# Patient Record
Sex: FEMALE | Race: WHITE | Employment: OTHER | URBAN - METROPOLITAN AREA
[De-identification: names, ages, dates, MRNs, and addresses within clinical notes are randomized per-mention and may not be internally consistent; named-entity substitution may affect disease eponyms.]

---

## 2018-07-17 ENCOUNTER — APPOINTMENT (OUTPATIENT)
Dept: CT IMAGING | Age: 83
End: 2018-07-17
Payer: MEDICARE

## 2018-07-17 ENCOUNTER — HOSPITAL ENCOUNTER (EMERGENCY)
Age: 83
Discharge: HOME | End: 2018-07-17
Payer: MEDICARE

## 2018-07-17 VITALS
HEIGHT: 59 IN | HEART RATE: 69 BPM | WEIGHT: 101 LBS | OXYGEN SATURATION: 96 % | RESPIRATION RATE: 20 BRPM | DIASTOLIC BLOOD PRESSURE: 79 MMHG | TEMPERATURE: 98.8 F | SYSTOLIC BLOOD PRESSURE: 173 MMHG | BODY MASS INDEX: 20.36 KG/M2

## 2018-07-17 DIAGNOSIS — K59.00 CONSTIPATION, UNSPECIFIED CONSTIPATION TYPE: Primary | ICD-10-CM

## 2018-07-17 DIAGNOSIS — N83.8 MASS OF LEFT OVARY: ICD-10-CM

## 2018-07-17 LAB
ALBUMIN SERPL-MCNC: 4.2 G/DL (ref 3.5–5.2)
ALP BLD-CCNC: 75 U/L (ref 35–104)
ALT SERPL-CCNC: 19 U/L (ref 0–32)
ANION GAP SERPL CALCULATED.3IONS-SCNC: 13 MMOL/L (ref 7–16)
AST SERPL-CCNC: 36 U/L (ref 0–31)
BASOPHILS ABSOLUTE: 0.06 E9/L (ref 0–0.2)
BASOPHILS RELATIVE PERCENT: 0.4 % (ref 0–2)
BILIRUB SERPL-MCNC: 0.7 MG/DL (ref 0–1.2)
BILIRUBIN URINE: NEGATIVE
BLOOD, URINE: NEGATIVE
BUN BLDV-MCNC: 15 MG/DL (ref 8–23)
CALCIUM SERPL-MCNC: 9.5 MG/DL (ref 8.6–10.2)
CHLORIDE BLD-SCNC: 90 MMOL/L (ref 98–107)
CLARITY: CLEAR
CO2: 26 MMOL/L (ref 22–29)
COLOR: YELLOW
CREAT SERPL-MCNC: 0.8 MG/DL (ref 0.5–1)
EOSINOPHILS ABSOLUTE: 0.18 E9/L (ref 0.05–0.5)
EOSINOPHILS RELATIVE PERCENT: 1.2 % (ref 0–6)
GFR AFRICAN AMERICAN: >60
GFR NON-AFRICAN AMERICAN: >60 ML/MIN/1.73
GLUCOSE BLD-MCNC: 101 MG/DL (ref 74–109)
GLUCOSE URINE: NEGATIVE MG/DL
HCT VFR BLD CALC: 34.7 % (ref 34–48)
HEMOGLOBIN: 11.1 G/DL (ref 11.5–15.5)
IMMATURE GRANULOCYTES #: 0.06 E9/L
IMMATURE GRANULOCYTES %: 0.4 % (ref 0–5)
KETONES, URINE: NEGATIVE MG/DL
LACTIC ACID: 1.7 MMOL/L (ref 0.5–2.2)
LEUKOCYTE ESTERASE, URINE: NEGATIVE
LIPASE: 41 U/L (ref 13–60)
LYMPHOCYTES ABSOLUTE: 1.67 E9/L (ref 1.5–4)
LYMPHOCYTES RELATIVE PERCENT: 11.2 % (ref 20–42)
MCH RBC QN AUTO: 28.7 PG (ref 26–35)
MCHC RBC AUTO-ENTMCNC: 32 % (ref 32–34.5)
MCV RBC AUTO: 89.7 FL (ref 80–99.9)
MONOCYTES ABSOLUTE: 1.35 E9/L (ref 0.1–0.95)
MONOCYTES RELATIVE PERCENT: 9 % (ref 2–12)
NEUTROPHILS ABSOLUTE: 11.65 E9/L (ref 1.8–7.3)
NEUTROPHILS RELATIVE PERCENT: 77.8 % (ref 43–80)
NITRITE, URINE: NEGATIVE
PDW BLD-RTO: 15 FL (ref 11.5–15)
PH UA: 6.5 (ref 5–9)
PLATELET # BLD: 315 E9/L (ref 130–450)
PMV BLD AUTO: 8.7 FL (ref 7–12)
POTASSIUM SERPL-SCNC: 4.4 MMOL/L (ref 3.5–5)
PROTEIN UA: NEGATIVE MG/DL
RBC # BLD: 3.87 E12/L (ref 3.5–5.5)
SODIUM BLD-SCNC: 129 MMOL/L (ref 132–146)
SPECIFIC GRAVITY UA: 1.01 (ref 1–1.03)
TOTAL PROTEIN: 7.3 G/DL (ref 6.4–8.3)
UROBILINOGEN, URINE: 0.2 E.U./DL
WBC # BLD: 15 E9/L (ref 4.5–11.5)

## 2018-07-17 PROCEDURE — 74177 CT ABD & PELVIS W/CONTRAST: CPT

## 2018-07-17 PROCEDURE — 81003 URINALYSIS AUTO W/O SCOPE: CPT

## 2018-07-17 PROCEDURE — 80053 COMPREHEN METABOLIC PANEL: CPT

## 2018-07-17 PROCEDURE — 6360000004 HC RX CONTRAST MEDICATION: Performed by: RADIOLOGY

## 2018-07-17 PROCEDURE — 85025 COMPLETE CBC W/AUTO DIFF WBC: CPT

## 2018-07-17 PROCEDURE — 36415 COLL VENOUS BLD VENIPUNCTURE: CPT

## 2018-07-17 PROCEDURE — 83690 ASSAY OF LIPASE: CPT

## 2018-07-17 PROCEDURE — 2580000003 HC RX 258: Performed by: PHYSICIAN ASSISTANT

## 2018-07-17 PROCEDURE — 99284 EMERGENCY DEPT VISIT MOD MDM: CPT

## 2018-07-17 PROCEDURE — 83605 ASSAY OF LACTIC ACID: CPT

## 2018-07-17 RX ORDER — SODIUM PHOSPHATE, DIBASIC AND SODIUM PHOSPHATE, MONOBASIC 7; 19 G/133ML; G/133ML
1 ENEMA RECTAL
Qty: 1 BOTTLE | Refills: 0 | Status: SHIPPED | OUTPATIENT
Start: 2018-07-17 | End: 2018-07-17

## 2018-07-17 RX ORDER — SENNA AND DOCUSATE SODIUM 50; 8.6 MG/1; MG/1
1 TABLET, FILM COATED ORAL DAILY
Qty: 30 TABLET | Refills: 0 | Status: ON HOLD | OUTPATIENT
Start: 2018-07-17 | End: 2020-07-21 | Stop reason: ALTCHOICE

## 2018-07-17 RX ORDER — MAGNESIUM CARB/ALUMINUM HYDROX 105-160MG
150 TABLET,CHEWABLE ORAL DAILY
Qty: 300 ML | Refills: 0 | Status: SHIPPED | OUTPATIENT
Start: 2018-07-17 | End: 2018-07-19

## 2018-07-17 RX ORDER — 0.9 % SODIUM CHLORIDE 0.9 %
1000 INTRAVENOUS SOLUTION INTRAVENOUS ONCE
Status: COMPLETED | OUTPATIENT
Start: 2018-07-17 | End: 2018-07-17

## 2018-07-17 RX ADMIN — SODIUM CHLORIDE 1000 ML: 9 INJECTION, SOLUTION INTRAVENOUS at 19:29

## 2018-07-17 RX ADMIN — IOHEXOL 50 ML: 240 INJECTION, SOLUTION INTRATHECAL; INTRAVASCULAR; INTRAVENOUS; ORAL at 20:39

## 2018-07-17 RX ADMIN — IOPAMIDOL 80 ML: 755 INJECTION, SOLUTION INTRAVENOUS at 20:39

## 2018-07-17 ASSESSMENT — PAIN DESCRIPTION - ORIENTATION: ORIENTATION: RIGHT

## 2018-07-17 ASSESSMENT — PAIN DESCRIPTION - LOCATION: LOCATION: BACK

## 2018-07-17 ASSESSMENT — PAIN SCALES - GENERAL: PAINLEVEL_OUTOF10: 7

## 2018-07-17 ASSESSMENT — PAIN DESCRIPTION - PAIN TYPE: TYPE: ACUTE PAIN

## 2018-07-17 ASSESSMENT — PAIN DESCRIPTION - DESCRIPTORS: DESCRIPTORS: ACHING

## 2018-07-17 NOTE — ED NOTES
Pt. C/o low, right sided back pain for the past week. Pt. Also states they have not had a BM for the past 4 days. Pt. States they also fell last night and were seen at a urgent care this morning for a skin tear on the R. Arm.      Maryana Nichole RN  07/17/18 7242

## 2018-07-17 NOTE — ED PROVIDER NOTES
Eosinophils # 0.18 0.05 - 0.50 E9/L    Basophils # 0.06 0.00 - 0.20 E9/L   Comprehensive Metabolic Panel   Result Value Ref Range    Sodium 129 (L) 132 - 146 mmol/L    Potassium 4.4 3.5 - 5.0 mmol/L    Chloride 90 (L) 98 - 107 mmol/L    CO2 26 22 - 29 mmol/L    Anion Gap 13 7 - 16 mmol/L    Glucose 101 74 - 109 mg/dL    BUN 15 8 - 23 mg/dL    CREATININE 0.8 0.5 - 1.0 mg/dL    GFR Non-African American >60 >=60 mL/min/1.73    GFR African American >60     Calcium 9.5 8.6 - 10.2 mg/dL    Total Protein 7.3 6.4 - 8.3 g/dL    Alb 4.2 3.5 - 5.2 g/dL    Total Bilirubin 0.7 0.0 - 1.2 mg/dL    Alkaline Phosphatase 75 35 - 104 U/L    ALT 19 0 - 32 U/L    AST 36 (H) 0 - 31 U/L   Lipase   Result Value Ref Range    Lipase 41 13 - 60 U/L   Urinalysis   Result Value Ref Range    Color, UA Yellow Straw/Yellow    Clarity, UA Clear Clear    Glucose, Ur Negative Negative mg/dL    Bilirubin Urine Negative Negative    Ketones, Urine Negative Negative mg/dL    Specific Gravity, UA 1.015 1.005 - 1.030    Blood, Urine Negative Negative    pH, UA 6.5 5.0 - 9.0    Protein, UA Negative Negative mg/dL    Urobilinogen, Urine 0.2 <2.0 E.U./dL    Nitrite, Urine Negative Negative    Leukocyte Esterase, Urine Negative Negative   Lactic Acid, Plasma   Result Value Ref Range    Lactic Acid 1.7 0.5 - 2.2 mmol/L        Imaging: All Radiology results interpreted by Radiologist unless otherwise noted. CT ABDOMEN PELVIS W IV CONTRAST Additional Contrast? Oral   Final Result   1. Fecal retention. 2. Small right pleural effusion and dependent bibasilar airspace   disease. 3. 53 x 40 mm cystic mass left ovary/adnexa which could be further   evaluated with nonemergent sonogram if clinically relevant.         ED Course / Medical Decision Making     Medications   0.9 % sodium chloride bolus (1,000 mLs Intravenous New Bag 7/17/18 1929)   iohexol (OMNIPAQUE 240) injection 50 mL (50 mLs Oral Given 7/17/18 2039)   iopamidol (ISOVUE-370) 76 % injection 80 mL NI  07/17/18 214

## 2018-07-18 NOTE — ED NOTES
Pt. Returned to bed from bathroom. Pt. States they passed a small amount of stool.      Rossi Hernandez RN  07/17/18 1545

## 2018-07-19 ENCOUNTER — APPOINTMENT (OUTPATIENT)
Dept: GENERAL RADIOLOGY | Age: 83
End: 2018-07-19
Payer: MEDICARE

## 2018-07-19 ENCOUNTER — HOSPITAL ENCOUNTER (EMERGENCY)
Age: 83
Discharge: HOME | End: 2018-07-19
Attending: EMERGENCY MEDICINE
Payer: MEDICARE

## 2018-07-19 ENCOUNTER — APPOINTMENT (OUTPATIENT)
Dept: CT IMAGING | Age: 83
End: 2018-07-19
Payer: MEDICARE

## 2018-07-19 VITALS
RESPIRATION RATE: 18 BRPM | HEIGHT: 59 IN | DIASTOLIC BLOOD PRESSURE: 90 MMHG | OXYGEN SATURATION: 97 % | WEIGHT: 101 LBS | HEART RATE: 83 BPM | SYSTOLIC BLOOD PRESSURE: 150 MMHG | BODY MASS INDEX: 20.36 KG/M2 | TEMPERATURE: 98.9 F

## 2018-07-19 DIAGNOSIS — R29.6 FREQUENT FALLS: ICD-10-CM

## 2018-07-19 DIAGNOSIS — M19.90 ARTHRITIS: ICD-10-CM

## 2018-07-19 DIAGNOSIS — S40.021A ARM CONTUSION, RIGHT, INITIAL ENCOUNTER: ICD-10-CM

## 2018-07-19 DIAGNOSIS — M25.561 ACUTE PAIN OF RIGHT KNEE: Primary | ICD-10-CM

## 2018-07-19 PROCEDURE — 73560 X-RAY EXAM OF KNEE 1 OR 2: CPT

## 2018-07-19 PROCEDURE — 99284 EMERGENCY DEPT VISIT MOD MDM: CPT

## 2018-07-19 PROCEDURE — 70450 CT HEAD/BRAIN W/O DYE: CPT

## 2018-07-19 PROCEDURE — 72125 CT NECK SPINE W/O DYE: CPT

## 2018-07-19 PROCEDURE — 6370000000 HC RX 637 (ALT 250 FOR IP): Performed by: EMERGENCY MEDICINE

## 2018-07-19 PROCEDURE — 73610 X-RAY EXAM OF ANKLE: CPT

## 2018-07-19 PROCEDURE — 73070 X-RAY EXAM OF ELBOW: CPT

## 2018-07-19 PROCEDURE — 73090 X-RAY EXAM OF FOREARM: CPT

## 2018-07-19 RX ORDER — ACETAMINOPHEN 325 MG/1
650 TABLET ORAL ONCE
Status: COMPLETED | OUTPATIENT
Start: 2018-07-19 | End: 2018-07-19

## 2018-07-19 RX ADMIN — ACETAMINOPHEN 650 MG: 325 TABLET ORAL at 21:39

## 2018-07-19 ASSESSMENT — PAIN SCALES - GENERAL
PAINLEVEL_OUTOF10: 1
PAINLEVEL_OUTOF10: 6
PAINLEVEL_OUTOF10: 9

## 2018-07-19 ASSESSMENT — ENCOUNTER SYMPTOMS
EYE REDNESS: 0
CONSTIPATION: 0
RHINORRHEA: 0
BACK PAIN: 0
NAUSEA: 0
SHORTNESS OF BREATH: 0
COUGH: 0
SORE THROAT: 0
VOMITING: 0
EYE PAIN: 0
DIARRHEA: 0
COLOR CHANGE: 1
WHEEZING: 0
ABDOMINAL PAIN: 0
BLOOD IN STOOL: 0

## 2018-07-19 ASSESSMENT — PAIN DESCRIPTION - DESCRIPTORS
DESCRIPTORS: ACHING
DESCRIPTORS: ACHING

## 2018-07-19 ASSESSMENT — PAIN DESCRIPTION - PAIN TYPE
TYPE: ACUTE PAIN
TYPE: ACUTE PAIN

## 2018-07-19 ASSESSMENT — PAIN DESCRIPTION - FREQUENCY: FREQUENCY: CONTINUOUS

## 2018-07-19 ASSESSMENT — PAIN DESCRIPTION - PROGRESSION: CLINICAL_PROGRESSION: GRADUALLY IMPROVING

## 2018-07-19 ASSESSMENT — PAIN DESCRIPTION - ORIENTATION
ORIENTATION: RIGHT
ORIENTATION: RIGHT

## 2018-07-19 ASSESSMENT — PAIN DESCRIPTION - LOCATION
LOCATION: KNEE
LOCATION: KNEE

## 2018-07-20 NOTE — ED PROVIDER NOTES
The patient is an 80-year-old female presents to the ED from home via EMS for the chief complaint of right knee pain. Onset started today. She complains of pain to her right knee as well as swelling. She denies any fall today. The patient's son and daughter-in-law are present at bedside and contribute to the history. The patient lives at home alone. She has a walker and a cane but does not use them to ambulate with at home. The patient reports she has severe arthritis and this is why she has frequent falls. She reports her last fall was on Tuesday evening. She states the lights went out and she tripped and fell landing on her right arm. She complains of bruising to her right arm and said that her right elbow was bleeding and went to urgent care with her family that evening. It is reported that her arm was wrapped but they deny her ever getting an x-ray at that time. The patient was also having constipation and was subsequently taken to [facility] ER for evaluation where she underwent blood work as well as a CAT scan of her abdomen which showed constipation at that time. She was given medications for her constipation and discharged home. The family reports that after she got home from the hospital she passed out for approximately 7-10 minutes. They denied her falling or hitting her head at that time. They deny any confusion yesterday or today. They report she has been having difficulty walking around her house. They report she has frequent falls. Deny being on any blood thinners except for baby aspirin. She is up-to-date on her tetanus. The history is provided by the patient. Review of Systems   Constitutional: Negative for chills and fever. HENT: Negative for ear pain, rhinorrhea and sore throat. Eyes: Negative for pain, redness and visual disturbance. Respiratory: Negative for cough, shortness of breath and wheezing. Cardiovascular: Negative for chest pain.    Gastrointestinal: Negative for motion without pain. Cardiovascular: Normal rate, regular rhythm, normal heart sounds and intact distal pulses. No murmur heard. Radial pulses are strong and symmetric bilaterally. No extremity edema. Pulmonary/Chest: Effort normal and breath sounds normal. No stridor. No respiratory distress. She has no wheezes. She has no rales. She exhibits no tenderness. Normal effort. No distress. SPO2 95% on room air. Abdominal: Soft. Bowel sounds are normal. There is no tenderness. There is no rebound and no guarding. Soft, no distention. Musculoskeletal: She exhibits no edema. Lymphadenopathy:     She has no cervical adenopathy. Neurological: She is alert and oriented to person, place, and time. No cranial nerve deficit. Coordination normal.   She is awake, alert and oriented. She converses appropriately. No slurred speech. No facial droop. No focal neurological deficit. Sensation is intact to light touch and symmetric throughout. Strength-no strong and symmetric in upper and lower extremities bilaterally. Skin: Skin is warm and dry. Bruising and ecchymosis noted. She is not diaphoretic. Superficial abrasion noted to the right posterior elbow. Nursing note and vitals reviewed. Procedures    MDM  Number of Diagnoses or Management Options  Acute pain of right knee:   Arm contusion, right, initial encounter:   Arthritis:   Frequent falls:   Diagnosis management comments: 27-year-old female presents to the for right knee pain. Further history reveals frequent falls. Exam reveals some bruising. She does have tenderness to palpation to her right knee. Imaging was obtained and reveals severe arthritis but no acute fracture or dislocation. Vitals are appropriate for her age. Recommended Tylenol for her pain and patient was given Tylenol here prior to discharge and felt better. She was also given ice for her knee. She was told to elevate and ice her knee.  Also recommended a knee brace that she can

## 2019-02-17 ENCOUNTER — APPOINTMENT (OUTPATIENT)
Dept: CT IMAGING | Age: 84
End: 2019-02-17
Payer: MEDICARE

## 2019-02-17 ENCOUNTER — APPOINTMENT (OUTPATIENT)
Dept: GENERAL RADIOLOGY | Age: 84
End: 2019-02-17
Payer: MEDICARE

## 2019-02-17 ENCOUNTER — HOSPITAL ENCOUNTER (OUTPATIENT)
Age: 84
Discharge: HOME | End: 2019-02-17
Payer: MEDICARE

## 2019-02-17 ENCOUNTER — HOSPITAL ENCOUNTER (EMERGENCY)
Age: 84
Discharge: HOME | End: 2019-02-18
Attending: EMERGENCY MEDICINE
Payer: MEDICARE

## 2019-02-17 ENCOUNTER — HOSPITAL ENCOUNTER (EMERGENCY)
Age: 84
Discharge: HOME | End: 2019-02-17
Attending: EMERGENCY MEDICINE
Payer: MEDICARE

## 2019-02-17 VITALS
HEART RATE: 73 BPM | TEMPERATURE: 98.2 F | SYSTOLIC BLOOD PRESSURE: 195 MMHG | DIASTOLIC BLOOD PRESSURE: 88 MMHG | RESPIRATION RATE: 16 BRPM | OXYGEN SATURATION: 95 % | WEIGHT: 108.31 LBS | BODY MASS INDEX: 21.88 KG/M2

## 2019-02-17 DIAGNOSIS — S02.401A CLOSED FRACTURE OF MAXILLARY SINUS, INITIAL ENCOUNTER (HCC): Primary | ICD-10-CM

## 2019-02-17 DIAGNOSIS — S62.607A CLOSED NONDISPLACED FRACTURE OF PHALANX OF LEFT LITTLE FINGER, UNSPECIFIED PHALANX, INITIAL ENCOUNTER: ICD-10-CM

## 2019-02-17 DIAGNOSIS — S02.401A CLOSED FRACTURE OF MAXILLARY SINUS, INITIAL ENCOUNTER (HCC): ICD-10-CM

## 2019-02-17 DIAGNOSIS — S61.213A LACERATION OF LEFT MIDDLE FINGER WITHOUT FOREIGN BODY WITHOUT DAMAGE TO NAIL, INITIAL ENCOUNTER: ICD-10-CM
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
## 2019-02-17 DIAGNOSIS — T14.90XA TRAUMA: Primary | ICD-10-CM

## 2019-02-17 LAB
ALBUMIN SERPL-MCNC: 3.8 G/DL (ref 3.5–5.2)
ALP BLD-CCNC: 61 U/L (ref 35–104)
ALT SERPL-CCNC: 11 U/L (ref 0–32)
ANION GAP SERPL CALCULATED.3IONS-SCNC: 10 MMOL/L (ref 7–16)
AST SERPL-CCNC: 21 U/L (ref 0–31)
BASOPHILS ABSOLUTE: 0.04 E9/L (ref 0–0.2)
BASOPHILS RELATIVE PERCENT: 0.3 % (ref 0–2)
BILIRUB SERPL-MCNC: 0.6 MG/DL (ref 0–1.2)
BUN BLDV-MCNC: 17 MG/DL (ref 8–23)
CALCIUM SERPL-MCNC: 9.2 MG/DL (ref 8.6–10.2)
CHLORIDE BLD-SCNC: 105 MMOL/L (ref 98–107)
CO2: 24 MMOL/L (ref 22–29)
CREAT SERPL-MCNC: 0.8 MG/DL (ref 0.5–1)
EOSINOPHILS ABSOLUTE: 0.11 E9/L (ref 0.05–0.5)
EOSINOPHILS RELATIVE PERCENT: 0.9 % (ref 0–6)
GFR AFRICAN AMERICAN: >60
GFR NON-AFRICAN AMERICAN: >60 ML/MIN/1.73
GLUCOSE BLD-MCNC: 115 MG/DL (ref 74–99)
HCT VFR BLD CALC: 37.3 % (ref 34–48)
HEMOGLOBIN: 11.7 G/DL (ref 11.5–15.5)
IMMATURE GRANULOCYTES #: 0.08 E9/L
IMMATURE GRANULOCYTES %: 0.6 % (ref 0–5)
LYMPHOCYTES ABSOLUTE: 1.04 E9/L (ref 1.5–4)
LYMPHOCYTES RELATIVE PERCENT: 8.1 % (ref 20–42)
MCH RBC QN AUTO: 29.9 PG (ref 26–35)
MCHC RBC AUTO-ENTMCNC: 31.4 % (ref 32–34.5)
MCV RBC AUTO: 95.4 FL (ref 80–99.9)
MONOCYTES ABSOLUTE: 1 E9/L (ref 0.1–0.95)
MONOCYTES RELATIVE PERCENT: 7.8 % (ref 2–12)
NEUTROPHILS ABSOLUTE: 10.5 E9/L (ref 1.8–7.3)
NEUTROPHILS RELATIVE PERCENT: 82.3 % (ref 43–80)
PDW BLD-RTO: 14.1 FL (ref 11.5–15)
PLATELET # BLD: 282 E9/L (ref 130–450)
PMV BLD AUTO: 8.9 FL (ref 7–12)
POTASSIUM SERPL-SCNC: 3.8 MMOL/L (ref 3.5–5)
RBC # BLD: 3.91 E12/L (ref 3.5–5.5)
SODIUM BLD-SCNC: 139 MMOL/L (ref 132–146)
TOTAL PROTEIN: 6.5 G/DL (ref 6.4–8.3)
WBC # BLD: 12.8 E9/L (ref 4.5–11.5)

## 2019-02-17 PROCEDURE — 80053 COMPREHEN METABOLIC PANEL: CPT

## 2019-02-17 PROCEDURE — 6370000000 HC RX 637 (ALT 250 FOR IP): Performed by: SURGERY

## 2019-02-17 PROCEDURE — 70450 CT HEAD/BRAIN W/O DYE: CPT

## 2019-02-17 PROCEDURE — 6370000000 HC RX 637 (ALT 250 FOR IP)

## 2019-02-17 PROCEDURE — 90715 TDAP VACCINE 7 YRS/> IM: CPT | Performed by: STUDENT IN AN ORGANIZED HEALTH CARE EDUCATION/TRAINING PROGRAM

## 2019-02-17 PROCEDURE — A0425 GROUND MILEAGE: HCPCS

## 2019-02-17 PROCEDURE — 73562 X-RAY EXAM OF KNEE 3: CPT

## 2019-02-17 PROCEDURE — 6370000000 HC RX 637 (ALT 250 FOR IP): Performed by: STUDENT IN AN ORGANIZED HEALTH CARE EDUCATION/TRAINING PROGRAM

## 2019-02-17 PROCEDURE — 72170 X-RAY EXAM OF PELVIS: CPT

## 2019-02-17 PROCEDURE — 12013 RPR F/E/E/N/L/M 2.6-5.0 CM: CPT

## 2019-02-17 PROCEDURE — 6360000002 HC RX W HCPCS: Performed by: EMERGENCY MEDICINE

## 2019-02-17 PROCEDURE — 99284 EMERGENCY DEPT VISIT MOD MDM: CPT

## 2019-02-17 PROCEDURE — 12002 RPR S/N/AX/GEN/TRNK2.6-7.5CM: CPT

## 2019-02-17 PROCEDURE — A0426 ALS 1: HCPCS

## 2019-02-17 PROCEDURE — 96374 THER/PROPH/DIAG INJ IV PUSH: CPT

## 2019-02-17 PROCEDURE — 73130 X-RAY EXAM OF HAND: CPT

## 2019-02-17 PROCEDURE — 85025 COMPLETE CBC W/AUTO DIFF WBC: CPT

## 2019-02-17 PROCEDURE — 72125 CT NECK SPINE W/O DYE: CPT

## 2019-02-17 PROCEDURE — 36415 COLL VENOUS BLD VENIPUNCTURE: CPT

## 2019-02-17 PROCEDURE — 6360000002 HC RX W HCPCS: Performed by: STUDENT IN AN ORGANIZED HEALTH CARE EDUCATION/TRAINING PROGRAM

## 2019-02-17 PROCEDURE — 71045 X-RAY EXAM CHEST 1 VIEW: CPT

## 2019-02-17 PROCEDURE — 90471 IMMUNIZATION ADMIN: CPT | Performed by: STUDENT IN AN ORGANIZED HEALTH CARE EDUCATION/TRAINING PROGRAM

## 2019-02-17 PROCEDURE — 70486 CT MAXILLOFACIAL W/O DYE: CPT

## 2019-02-17 RX ORDER — CEPHALEXIN 500 MG/1
500 CAPSULE ORAL 4 TIMES DAILY
Qty: 20 CAPSULE | Refills: 0 | Status: SHIPPED | OUTPATIENT
Start: 2019-02-17 | End: 2019-02-18

## 2019-02-17 RX ORDER — FENTANYL CITRATE 50 UG/ML
50 INJECTION, SOLUTION INTRAMUSCULAR; INTRAVENOUS ONCE
Status: COMPLETED | OUTPATIENT
Start: 2019-02-17 | End: 2019-02-17

## 2019-02-17 RX ORDER — METOPROLOL TARTRATE 100 MG/1
100 TABLET ORAL
Status: ON HOLD | COMMUNITY
End: 2020-07-26 | Stop reason: HOSPADM

## 2019-02-17 RX ORDER — FENTANYL CITRATE 50 UG/ML
INJECTION, SOLUTION INTRAMUSCULAR; INTRAVENOUS
Status: DISCONTINUED
Start: 2019-02-17 | End: 2019-02-17 | Stop reason: HOSPADM

## 2019-02-17 RX ORDER — CEPHALEXIN 250 MG/1
250 CAPSULE ORAL EVERY 6 HOURS SCHEDULED
Status: DISCONTINUED | OUTPATIENT
Start: 2019-02-18 | End: 2019-02-18 | Stop reason: HOSPADM

## 2019-02-17 RX ORDER — ACETAMINOPHEN 500 MG
500 TABLET ORAL ONCE
Status: COMPLETED | OUTPATIENT
Start: 2019-02-17 | End: 2019-02-17

## 2019-02-17 RX ADMIN — Medication 2 EACH: at 18:55

## 2019-02-17 RX ADMIN — FENTANYL CITRATE 50 MCG: 50 INJECTION, SOLUTION INTRAMUSCULAR; INTRAVENOUS at 19:33

## 2019-02-17 RX ADMIN — ACETAMINOPHEN 500 MG: 500 TABLET ORAL at 23:30

## 2019-02-17 RX ADMIN — CEPHALEXIN 250 MG: 250 CAPSULE ORAL at 22:44

## 2019-02-17 RX ADMIN — TETANUS TOXOID, REDUCED DIPHTHERIA TOXOID AND ACELLULAR PERTUSSIS VACCINE, ADSORBED 0.5 ML: 5; 2.5; 8; 8; 2.5 SUSPENSION INTRAMUSCULAR at 18:46

## 2019-02-17 ASSESSMENT — PAIN SCALES - GENERAL
PAINLEVEL_OUTOF10: 7
PAINLEVEL_OUTOF10: 4
PAINLEVEL_OUTOF10: 8
PAINLEVEL_OUTOF10: 7
PAINLEVEL_OUTOF10: 4

## 2019-02-17 ASSESSMENT — PAIN DESCRIPTION - ORIENTATION
ORIENTATION: MID
ORIENTATION: LEFT

## 2019-02-17 ASSESSMENT — PAIN DESCRIPTION - DESCRIPTORS
DESCRIPTORS: ACHING
DESCRIPTORS: DISCOMFORT

## 2019-02-17 ASSESSMENT — PAIN DESCRIPTION - PAIN TYPE
TYPE: ACUTE PAIN
TYPE: ACUTE PAIN

## 2019-02-17 ASSESSMENT — PAIN DESCRIPTION - ONSET: ONSET: ON-GOING

## 2019-02-17 ASSESSMENT — PAIN DESCRIPTION - FREQUENCY: FREQUENCY: INTERMITTENT

## 2019-02-17 ASSESSMENT — PAIN DESCRIPTION - LOCATION
LOCATION: FINGER (COMMENT WHICH ONE)
LOCATION: FACE

## 2019-02-18 ENCOUNTER — TELEPHONE (OUTPATIENT)
Dept: SURGERY | Age: 84
End: 2019-02-18

## 2019-02-18 VITALS
OXYGEN SATURATION: 95 % | SYSTOLIC BLOOD PRESSURE: 161 MMHG | DIASTOLIC BLOOD PRESSURE: 81 MMHG | RESPIRATION RATE: 18 BRPM | BODY MASS INDEX: 21.77 KG/M2 | TEMPERATURE: 97.1 F | WEIGHT: 108 LBS | HEIGHT: 59 IN | HEART RATE: 79 BPM

## 2019-02-18 PROBLEM — S02.2XXA CLOSED FRACTURE OF NASAL BONE: Status: ACTIVE | Noted: 2019-02-18

## 2019-02-18 PROBLEM — S62.91XA CLOSED FRACTURE OF RIGHT HAND: Status: ACTIVE | Noted: 2019-02-18

## 2019-02-18 PROCEDURE — 99283 EMERGENCY DEPT VISIT LOW MDM: CPT | Performed by: SURGERY

## 2019-02-18 RX ORDER — CEPHALEXIN 500 MG/1
500 CAPSULE ORAL 4 TIMES DAILY
Qty: 20 CAPSULE | Refills: 0 | Status: SHIPPED | OUTPATIENT
Start: 2019-02-18 | End: 2019-02-23

## 2019-02-18 ASSESSMENT — ENCOUNTER SYMPTOMS
FACIAL SWELLING: 1
RHINORRHEA: 0
SHORTNESS OF BREATH: 0
ABDOMINAL PAIN: 0
PHOTOPHOBIA: 0
SINUS PAIN: 1
CHEST TIGHTNESS: 0
BACK PAIN: 0

## 2019-02-20 ENCOUNTER — OFFICE VISIT (OUTPATIENT)
Dept: SURGERY | Age: 84
End: 2019-02-20
Payer: MEDICARE

## 2019-02-20 VITALS — TEMPERATURE: 98 F | HEART RATE: 79 BPM | OXYGEN SATURATION: 93 % | RESPIRATION RATE: 20 BRPM

## 2019-02-20 DIAGNOSIS — S02.2XXA CLOSED FRACTURE OF NASAL BONE, INITIAL ENCOUNTER: Primary | ICD-10-CM

## 2019-02-20 PROCEDURE — 99204 OFFICE O/P NEW MOD 45 MIN: CPT | Performed by: PLASTIC SURGERY

## 2019-02-22 LAB
EKG ATRIAL RATE: 77 BPM
EKG P AXIS: 101 DEGREES
EKG P-R INTERVAL: 150 MS
EKG Q-T INTERVAL: 388 MS
EKG QRS DURATION: 74 MS
EKG QTC CALCULATION (BAZETT): 439 MS
EKG R AXIS: 29 DEGREES
EKG T AXIS: 60 DEGREES
EKG VENTRICULAR RATE: 77 BPM

## 2019-04-03 ENCOUNTER — OFFICE VISIT (OUTPATIENT)
Dept: SURGERY | Age: 84
End: 2019-04-03
Payer: MEDICARE

## 2019-04-03 VITALS
TEMPERATURE: 98.5 F | DIASTOLIC BLOOD PRESSURE: 66 MMHG | HEART RATE: 59 BPM | BODY MASS INDEX: 21.37 KG/M2 | OXYGEN SATURATION: 96 % | WEIGHT: 106 LBS | SYSTOLIC BLOOD PRESSURE: 108 MMHG | HEIGHT: 59 IN

## 2019-04-03 DIAGNOSIS — S02.2XXA CLOSED FRACTURE OF NASAL BONE, INITIAL ENCOUNTER: Primary | ICD-10-CM

## 2019-04-03 PROCEDURE — 99213 OFFICE O/P EST LOW 20 MIN: CPT | Performed by: PLASTIC SURGERY

## 2019-04-03 NOTE — PROGRESS NOTES
Subjective: Follow up today from minimally displaced nasal bone fracture. Patient states that she had some dried blood from the nose a few weeks ago but has evanescent stuffiness. Objective:    /66 (Site: Left Upper Arm, Position: Sitting)   Pulse 59   Temp 98.5 °F (36.9 °C)   Ht 4' 11\" (1.499 m)   Wt 106 lb (48.1 kg)   SpO2 96%   BMI 21.41 kg/m²       Wound: Clean, dry, and intact, no drainage. Nose is midline. Some irritation to the right nasal septum. Assessment:    Patient Active Problem List   Diagnosis    Osteomyelitis of toe of right foot (HCC)    Multiple rib fractures    [facility]oid [facility]) [facility] [address]    Closed fracture of nasal bone    Closed fracture of right hand    Closed fracture of maxillary sinus ([facility])       Plan:     No surgical intervention required. Educated patient and son to utilize nasal saline sprays for lubrication and cleansing the nose. F/U when necessary    Call office with concerns or signs of infection. Lucia Simon MD   10:54 AM  4/3/2019.

## 2019-04-23 ENCOUNTER — HOSPITAL ENCOUNTER (EMERGENCY)
Age: 84
Discharge: HOME | End: 2019-04-23
Attending: EMERGENCY MEDICINE
Payer: MEDICARE

## 2019-04-23 ENCOUNTER — APPOINTMENT (OUTPATIENT)
Dept: ULTRASOUND IMAGING | Age: 84
End: 2019-04-23
Payer: MEDICARE

## 2019-04-23 VITALS
DIASTOLIC BLOOD PRESSURE: 78 MMHG | SYSTOLIC BLOOD PRESSURE: 164 MMHG | WEIGHT: 105 LBS | BODY MASS INDEX: 21.17 KG/M2 | RESPIRATION RATE: 16 BRPM | HEART RATE: 57 BPM | TEMPERATURE: 98.1 F | OXYGEN SATURATION: 95 % | HEIGHT: 59 IN

## 2019-04-23 DIAGNOSIS — R10.30 LOWER ABDOMINAL PAIN: Primary | ICD-10-CM

## 2019-04-23 LAB
ANION GAP SERPL CALCULATED.3IONS-SCNC: 11 MMOL/L (ref 7–16)
BASOPHILS ABSOLUTE: 0.05 E9/L (ref 0–0.2)
BASOPHILS RELATIVE PERCENT: 0.4 % (ref 0–2)
BILIRUBIN URINE: NEGATIVE
BLOOD, URINE: NEGATIVE
BUN BLDV-MCNC: 21 MG/DL (ref 8–23)
CALCIUM SERPL-MCNC: 9.7 MG/DL (ref 8.6–10.2)
CHLORIDE BLD-SCNC: 96 MMOL/L (ref 98–107)
CLARITY: CLEAR
CO2: 27 MMOL/L (ref 22–29)
COLOR: YELLOW
CREAT SERPL-MCNC: 1 MG/DL (ref 0.5–1)
EKG ATRIAL RATE: 61 BPM
EKG P AXIS: 60 DEGREES
EKG P-R INTERVAL: 166 MS
EKG Q-T INTERVAL: 422 MS
EKG QRS DURATION: 82 MS
EKG QTC CALCULATION (BAZETT): 424 MS
EKG R AXIS: 32 DEGREES
EKG T AXIS: 70 DEGREES
EKG VENTRICULAR RATE: 61 BPM
EOSINOPHILS ABSOLUTE: 0.08 E9/L (ref 0.05–0.5)
EOSINOPHILS RELATIVE PERCENT: 0.6 % (ref 0–6)
GFR AFRICAN AMERICAN: >60
GFR NON-AFRICAN AMERICAN: 52 ML/MIN/1.73
GLUCOSE BLD-MCNC: 101 MG/DL (ref 74–99)
GLUCOSE URINE: NEGATIVE MG/DL
HCT VFR BLD CALC: 39.3 % (ref 34–48)
HEMOGLOBIN: 12.3 G/DL (ref 11.5–15.5)
IMMATURE GRANULOCYTES #: 0.07 E9/L
IMMATURE GRANULOCYTES %: 0.6 % (ref 0–5)
KETONES, URINE: NEGATIVE MG/DL
LACTIC ACID: 1.3 MMOL/L (ref 0.5–2.2)
LEUKOCYTE ESTERASE, URINE: NEGATIVE
LYMPHOCYTES ABSOLUTE: 1.24 E9/L (ref 1.5–4)
LYMPHOCYTES RELATIVE PERCENT: 9.9 % (ref 20–42)
MCH RBC QN AUTO: 29.9 PG (ref 26–35)
MCHC RBC AUTO-ENTMCNC: 31.3 % (ref 32–34.5)
MCV RBC AUTO: 95.6 FL (ref 80–99.9)
MONOCYTES ABSOLUTE: 0.71 E9/L (ref 0.1–0.95)
MONOCYTES RELATIVE PERCENT: 5.7 % (ref 2–12)
NEUTROPHILS ABSOLUTE: 10.32 E9/L (ref 1.8–7.3)
NEUTROPHILS RELATIVE PERCENT: 82.8 % (ref 43–80)
NITRITE, URINE: NEGATIVE
PDW BLD-RTO: 13.6 FL (ref 11.5–15)
PH UA: 7.5 (ref 5–9)
PLATELET # BLD: 349 E9/L (ref 130–450)
PMV BLD AUTO: 8.9 FL (ref 7–12)
POTASSIUM REFLEX MAGNESIUM: 5.2 MMOL/L (ref 3.5–5)
PROTEIN UA: NEGATIVE MG/DL
RBC # BLD: 4.11 E12/L (ref 3.5–5.5)
SODIUM BLD-SCNC: 134 MMOL/L (ref 132–146)
SPECIFIC GRAVITY UA: 1.01 (ref 1–1.03)
UROBILINOGEN, URINE: 0.2 E.U./DL
WBC # BLD: 12.5 E9/L (ref 4.5–11.5)

## 2019-04-23 PROCEDURE — 36415 COLL VENOUS BLD VENIPUNCTURE: CPT

## 2019-04-23 PROCEDURE — 83605 ASSAY OF LACTIC ACID: CPT

## 2019-04-23 PROCEDURE — 6360000002 HC RX W HCPCS: Performed by: STUDENT IN AN ORGANIZED HEALTH CARE EDUCATION/TRAINING PROGRAM

## 2019-04-23 PROCEDURE — 76856 US EXAM PELVIC COMPLETE: CPT

## 2019-04-23 PROCEDURE — 85025 COMPLETE CBC W/AUTO DIFF WBC: CPT

## 2019-04-23 PROCEDURE — 99284 EMERGENCY DEPT VISIT MOD MDM: CPT

## 2019-04-23 PROCEDURE — 80048 BASIC METABOLIC PNL TOTAL CA: CPT

## 2019-04-23 PROCEDURE — 81003 URINALYSIS AUTO W/O SCOPE: CPT

## 2019-04-23 PROCEDURE — 93005 ELECTROCARDIOGRAM TRACING: CPT | Performed by: STUDENT IN AN ORGANIZED HEALTH CARE EDUCATION/TRAINING PROGRAM

## 2019-04-23 PROCEDURE — 96374 THER/PROPH/DIAG INJ IV PUSH: CPT

## 2019-04-23 RX ORDER — KETOROLAC TROMETHAMINE 15 MG/ML
15 INJECTION, SOLUTION INTRAMUSCULAR; INTRAVENOUS ONCE
Status: COMPLETED | OUTPATIENT
Start: 2019-04-23 | End: 2019-04-23

## 2019-04-23 RX ADMIN — KETOROLAC TROMETHAMINE 15 MG: 15 INJECTION, SOLUTION INTRAMUSCULAR; INTRAVENOUS at 11:30

## 2019-04-23 ASSESSMENT — ENCOUNTER SYMPTOMS
BLOOD IN STOOL: 0
ABDOMINAL DISTENTION: 0
SINUS PRESSURE: 0
SORE THROAT: 0
ANAL BLEEDING: 0
VOMITING: 0
COUGH: 0
DIARRHEA: 0
SHORTNESS OF BREATH: 0
CHEST TIGHTNESS: 0
SINUS PAIN: 0
ABDOMINAL PAIN: 1
NAUSEA: 0
CONSTIPATION: 0

## 2019-04-23 ASSESSMENT — PAIN SCALES - GENERAL
PAINLEVEL_OUTOF10: 9
PAINLEVEL_OUTOF10: 2

## 2019-04-23 NOTE — ED PROVIDER NOTES
Patient is in 69-year-old female who presents with 1 day of abdominal pain. Last year patient incidentally was found to have a left ovarian cyst.  She has been following with Dr. Gilford Edis her OB. She has had 2 ultrasounds without significant change. She states that the OB has just been monitoring, she does not want to perform surgery on the patient because of her age. The patient began to have abdominal pain in the left lower quadrant beginning yesterday. She was instructed by her OB to come here to rule out rupture or torsion. Patient denies any other symptoms. She denies fever, chills, lightheadedness, dizziness, chest pain, shortness of breath, nausea, vomiting, diarrhea, constipation, urinary symptoms. She denies any abnormal bleeding. Patient states that the pain has decreased this morning but is still vaguely present. Review of Systems   Constitutional: Negative for chills and fever. HENT: Negative for sinus pressure, sinus pain and sore throat. Eyes: Negative for visual disturbance. Respiratory: Negative for cough, chest tightness and shortness of breath. Cardiovascular: Negative for chest pain and palpitations. Gastrointestinal: Positive for abdominal pain. Negative for abdominal distention, anal bleeding, blood in stool, constipation, diarrhea, nausea and vomiting. Endocrine: Negative for polyuria. Genitourinary: Negative for dysuria and hematuria. Musculoskeletal: Negative for arthralgias and myalgias. Skin: Negative for rash. Neurological: Negative for dizziness, weakness, light-headedness, numbness and headaches. Psychiatric/Behavioral: Negative for agitation and confusion. Physical Exam   Constitutional: She is oriented to person, place, and time. She appears well-developed and well-nourished. HENT:   Head: Normocephalic and atraumatic. Eyes: Pupils are equal, round, and reactive to light. EOM are normal.   Neck: Normal range of motion. Cardiovascular: Normal rate and regular rhythm. Pulmonary/Chest: Effort normal and breath sounds normal. She has no wheezes. Abdominal: Soft. Bowel sounds are normal. She exhibits no distension and no mass. There is tenderness. There is no rebound and no guarding. Musculoskeletal: Normal range of motion. She exhibits no tenderness. Neurological: She is alert and oriented to person, place, and time. Skin: Skin is warm and dry. Capillary refill takes less than 2 seconds. Psychiatric: She has a normal mood and affect. Mild tenderness to palpation left upper and lower quadrants  Mild flank pain on the left        EKG Interpretation. EKG: Rate 61  normal EKG, normal sinus rhythm  unchanged from previous tracings. ED Course as of Apr 23 1338   Tue Apr 23, 2019   1147   ATTENDING PROVIDER ATTESTATION:     I have personally performed and/or participated in the history, exam, medical decision making, and procedures and agree with all pertinent clinical information unless otherwise noted. I have also reviewed and agree with the past medical, family and social history unless otherwise noted. I have discussed this patient in detail with the resident and provided the instruction and education regarding the evidence-based evaluation and treatment of [unfilled]  History: Presents emergency Department with complaint of left abdominal pain that radiates into the flank area. She states it feels a pressure. She denies any constitutional symptoms of fever, nausea, vomiting, diarrhea, constipation or urinary symptoms. She has known cyst on the left ovary, which has been closely followed by Dr. Tatiana Perez as an outpatient. My findings: Aleta Yu is a 80 y.o. female whom is in no distress. Physical exam reveals a well-appearing. Heart regular rate and rhythm, lungs clear to auscultation, abdomen is soft and mildly tender in the left lower quadrant. No rebound, guarding or rigidity appreciated.   No masses. My plan: Symptomatic and supportive care. H&N ultrasound to further evaluate, as well as labs. TEMPUTI      Electronically signed by Christa Figueroa DO on 4/23/19 at 11:47 AM          [JS]      ED Course User Index  [JS] Christa Figueroa DO       ED Course as of Apr 23 1339   Tue Apr 23, 2019   1147   ATTENDING PROVIDER ATTESTATION:     I have personally performed and/or participated in the history, exam, medical decision making, and procedures and agree with all pertinent clinical information unless otherwise noted. I have also reviewed and agree with the past medical, family and social history unless otherwise noted. I have discussed this patient in detail with the resident and provided the instruction and education regarding the evidence-based evaluation and treatment of [unfilled]  History: Presents emergency Department with complaint of left abdominal pain that radiates into the flank area. She states it feels a pressure. She denies any constitutional symptoms of fever, nausea, vomiting, diarrhea, constipation or urinary symptoms. She has known cyst on the left ovary, which has been closely followed by Dr. Arpan Mcgarry as an outpatient. My findings: Velma Meng is a 80 y.o. female whom is in no distress. Physical exam reveals a well-appearing. Heart regular rate and rhythm, lungs clear to auscultation, abdomen is soft and mildly tender in the left lower quadrant. No rebound, guarding or rigidity appreciated. No masses. My plan: Symptomatic and supportive care. H&N ultrasound to further evaluate, as well as labs. TEMPUTI      Electronically signed by Christa Figueroa DO on 4/23/19 at 11:47 AM          [JS]      ED Course User Index  [JS] Christa Figueroa DO       --------------------------------------------- PAST HISTORY ---------------------------------------------  Past Medical History:  has a past medical history of Arthritis, History of blood transfusion, Hyperlipidemia, Hypertension, Rheumatoid arthritis(714.0), and Thyroid disease. Past Surgical History:  has a past surgical history that includes Carpal tunnel release; Cholecystectomy; joint replacement; and Inguinal hernia repair (Right). Social History:  reports that she has never smoked. She has never used smokeless tobacco. She reports that she does not drink alcohol or use drugs. Family History: family history includes Heart Disease in her brother. The patients home medications have been reviewed. Allergies: Levofloxacin; Bactrim [sulfamethoxazole-trimethoprim]; Erythromycin; Amoxicillin; Celebrex [celecoxib]; Clinoril [sulindac];  Codeine; and Morphine    -------------------------------------------------- RESULTS -------------------------------------------------  Labs:  Results for orders placed or performed during the hospital encounter of 04/23/19   CBC Auto Differential   Result Value Ref Range    WBC 12.5 (H) 4.5 - 11.5 E9/L    RBC 4.11 3.50 - 5.50 E12/L    Hemoglobin 12.3 11.5 - 15.5 g/dL    Hematocrit 39.3 34.0 - 48.0 %    MCV 95.6 80.0 - 99.9 fL    MCH 29.9 26.0 - 35.0 pg    MCHC 31.3 (L) 32.0 - 34.5 %    RDW 13.6 11.5 - 15.0 fL    Platelets 321 704 - 491 E9/L    MPV 8.9 7.0 - 12.0 fL    Neutrophils % 82.8 (H) 43.0 - 80.0 %    Immature Granulocytes % 0.6 0.0 - 5.0 %    Lymphocytes % 9.9 (L) 20.0 - 42.0 %    Monocytes % 5.7 2.0 - 12.0 %    Eosinophils % 0.6 0.0 - 6.0 %    Basophils % 0.4 0.0 - 2.0 %    Neutrophils # 10.32 (H) 1.80 - 7.30 E9/L    Immature Granulocytes # 0.07 E9/L    Lymphocytes # 1.24 (L) 1.50 - 4.00 E9/L    Monocytes # 0.71 0.10 - 0.95 E9/L    Eosinophils # 0.08 0.05 - 0.50 E9/L    Basophils # 0.05 0.00 - 0.20 T5/Z   Basic Metabolic Panel w/ Reflex to MG   Result Value Ref Range    Sodium 134 132 - 146 mmol/L    Potassium reflex Magnesium 5.2 (H) 3.5 - 5.0 mmol/L    Chloride 96 (L) 98 - 107 mmol/L    CO2 27 22 - 29 mmol/L    Anion Gap 11 7 - 16 mmol/L discussed todays results, in addition to providing specific details for the plan of care and counseling regarding the diagnosis and prognosis. Their questions are answered at this time and they are agreeable with the plan. I discussed at length with them reasons for immediate return here for re evaluation. They will followup with primary care by calling their office tomorrow. --------------------------------- ADDITIONAL PROVIDER NOTES ---------------------------------  At this time the patient is without objective evidence of an acute process requiring hospitalization or inpatient management. They have remained hemodynamically stable throughout their entire ED visit and are stable for discharge with outpatient follow-up. The plan has been discussed in detail and they are aware of the specific conditions for emergent return, as well as the importance of follow-up. Discuss results with patient and family. Ovarian cyst appears smaller than on previous studies. The patient states that her pain is improving. Instructed patient to rest.  She will follow up with Dr. Donovan Bowie at a later date. Patient is comfortable with discharge at this time. Instructed to return if symptoms return or progress. New Prescriptions    No medications on file       Diagnosis:  1. Lower abdominal pain        Disposition:  Patient's disposition: Discharge to home  Patient's condition is stable.          Sarah Rivero DO  Resident  04/23/19 8885

## 2019-04-27 ENCOUNTER — APPOINTMENT (OUTPATIENT)
Dept: CT IMAGING | Age: 84
End: 2019-04-27
Payer: MEDICARE

## 2019-04-27 ENCOUNTER — HOSPITAL ENCOUNTER (EMERGENCY)
Age: 84
Discharge: HOME | End: 2019-04-27
Attending: EMERGENCY MEDICINE
Payer: MEDICARE

## 2019-04-27 VITALS
HEART RATE: 59 BPM | OXYGEN SATURATION: 96 % | DIASTOLIC BLOOD PRESSURE: 76 MMHG | WEIGHT: 106.5 LBS | TEMPERATURE: 98.2 F | BODY MASS INDEX: 21.47 KG/M2 | HEIGHT: 59 IN | SYSTOLIC BLOOD PRESSURE: 170 MMHG | RESPIRATION RATE: 28 BRPM

## 2019-04-27 DIAGNOSIS — M19.90 ARTHRITIS: ICD-10-CM

## 2019-04-27 DIAGNOSIS — D72.829 LEUKOCYTOSIS, UNSPECIFIED TYPE: ICD-10-CM

## 2019-04-27 DIAGNOSIS — R10.9 LEFT FLANK PAIN: Primary | ICD-10-CM

## 2019-04-27 DIAGNOSIS — Z79.52 CURRENT CHRONIC USE OF SYSTEMIC STEROIDS: ICD-10-CM

## 2019-04-27 LAB
ALBUMIN SERPL-MCNC: 3.9 G/DL (ref 3.5–5.2)
ALP BLD-CCNC: 59 U/L (ref 35–104)
ALT SERPL-CCNC: 11 U/L (ref 0–32)
ANION GAP SERPL CALCULATED.3IONS-SCNC: 14 MMOL/L (ref 7–16)
AST SERPL-CCNC: 22 U/L (ref 0–31)
BASOPHILS ABSOLUTE: 0.04 E9/L (ref 0–0.2)
BASOPHILS RELATIVE PERCENT: 0.3 % (ref 0–2)
BILIRUB SERPL-MCNC: 0.5 MG/DL (ref 0–1.2)
BILIRUBIN URINE: NEGATIVE
BLOOD, URINE: NEGATIVE
BUN BLDV-MCNC: 19 MG/DL (ref 8–23)
CALCIUM SERPL-MCNC: 9.5 MG/DL (ref 8.6–10.2)
CHLORIDE BLD-SCNC: 90 MMOL/L (ref 98–107)
CLARITY: CLEAR
CO2: 23 MMOL/L (ref 22–29)
COLOR: YELLOW
CREAT SERPL-MCNC: 0.7 MG/DL (ref 0.5–1)
EOSINOPHILS ABSOLUTE: 0.03 E9/L (ref 0.05–0.5)
EOSINOPHILS RELATIVE PERCENT: 0.2 % (ref 0–6)
GFR AFRICAN AMERICAN: >60
GFR NON-AFRICAN AMERICAN: >60 ML/MIN/1.73
GLUCOSE BLD-MCNC: 109 MG/DL (ref 74–99)
GLUCOSE URINE: NEGATIVE MG/DL
HCT VFR BLD CALC: 36.9 % (ref 34–48)
HEMOGLOBIN: 12.1 G/DL (ref 11.5–15.5)
IMMATURE GRANULOCYTES #: 0.06 E9/L
IMMATURE GRANULOCYTES %: 0.5 % (ref 0–5)
KETONES, URINE: NEGATIVE MG/DL
LACTIC ACID: 2.1 MMOL/L (ref 0.5–2.2)
LEUKOCYTE ESTERASE, URINE: NEGATIVE
LIPASE: 41 U/L (ref 13–60)
LYMPHOCYTES ABSOLUTE: 0.73 E9/L (ref 1.5–4)
LYMPHOCYTES RELATIVE PERCENT: 5.6 % (ref 20–42)
MCH RBC QN AUTO: 30.2 PG (ref 26–35)
MCHC RBC AUTO-ENTMCNC: 32.8 % (ref 32–34.5)
MCV RBC AUTO: 92 FL (ref 80–99.9)
MONOCYTES ABSOLUTE: 1.03 E9/L (ref 0.1–0.95)
MONOCYTES RELATIVE PERCENT: 8 % (ref 2–12)
NEUTROPHILS ABSOLUTE: 11.05 E9/L (ref 1.8–7.3)
NEUTROPHILS RELATIVE PERCENT: 85.4 % (ref 43–80)
NITRITE, URINE: NEGATIVE
PDW BLD-RTO: 13.2 FL (ref 11.5–15)
PH UA: 6.5 (ref 5–9)
PLATELET # BLD: 309 E9/L (ref 130–450)
PMV BLD AUTO: 9 FL (ref 7–12)
POTASSIUM SERPL-SCNC: 4.3 MMOL/L (ref 3.5–5)
PROTEIN UA: NEGATIVE MG/DL
RBC # BLD: 4.01 E12/L (ref 3.5–5.5)
SODIUM BLD-SCNC: 127 MMOL/L (ref 132–146)
SPECIFIC GRAVITY UA: 1.01 (ref 1–1.03)
TOTAL PROTEIN: 7 G/DL (ref 6.4–8.3)
UROBILINOGEN, URINE: 0.2 E.U./DL
WBC # BLD: 12.9 E9/L (ref 4.5–11.5)

## 2019-04-27 PROCEDURE — 85025 COMPLETE CBC W/AUTO DIFF WBC: CPT

## 2019-04-27 PROCEDURE — 83690 ASSAY OF LIPASE: CPT

## 2019-04-27 PROCEDURE — 36415 COLL VENOUS BLD VENIPUNCTURE: CPT

## 2019-04-27 PROCEDURE — 74177 CT ABD & PELVIS W/CONTRAST: CPT

## 2019-04-27 PROCEDURE — 83605 ASSAY OF LACTIC ACID: CPT

## 2019-04-27 PROCEDURE — 80053 COMPREHEN METABOLIC PANEL: CPT

## 2019-04-27 PROCEDURE — 72131 CT LUMBAR SPINE W/O DYE: CPT

## 2019-04-27 PROCEDURE — 6360000004 HC RX CONTRAST MEDICATION: Performed by: RADIOLOGY

## 2019-04-27 PROCEDURE — 2580000003 HC RX 258: Performed by: EMERGENCY MEDICINE

## 2019-04-27 PROCEDURE — 81003 URINALYSIS AUTO W/O SCOPE: CPT

## 2019-04-27 PROCEDURE — 96374 THER/PROPH/DIAG INJ IV PUSH: CPT

## 2019-04-27 PROCEDURE — 96375 TX/PRO/DX INJ NEW DRUG ADDON: CPT

## 2019-04-27 PROCEDURE — 6360000002 HC RX W HCPCS: Performed by: EMERGENCY MEDICINE

## 2019-04-27 PROCEDURE — 99284 EMERGENCY DEPT VISIT MOD MDM: CPT

## 2019-04-27 RX ORDER — HYDROCODONE BITARTRATE AND ACETAMINOPHEN 5; 325 MG/1; MG/1
1 TABLET ORAL EVERY 4 HOURS PRN
Qty: 30 TABLET | Refills: 0 | Status: SHIPPED | OUTPATIENT
Start: 2019-04-27 | End: 2019-05-02

## 2019-04-27 RX ORDER — FENTANYL CITRATE 50 UG/ML
50 INJECTION, SOLUTION INTRAMUSCULAR; INTRAVENOUS ONCE
Status: COMPLETED | OUTPATIENT
Start: 2019-04-27 | End: 2019-04-27

## 2019-04-27 RX ORDER — POLYETHYLENE GLYCOL 3350 17 G/17G
17 POWDER, FOR SOLUTION ORAL DAILY
COMMUNITY

## 2019-04-27 RX ORDER — 0.9 % SODIUM CHLORIDE 0.9 %
500 INTRAVENOUS SOLUTION INTRAVENOUS ONCE
Status: COMPLETED | OUTPATIENT
Start: 2019-04-27 | End: 2019-04-27

## 2019-04-27 RX ORDER — ONDANSETRON 2 MG/ML
4 INJECTION INTRAMUSCULAR; INTRAVENOUS ONCE
Status: COMPLETED | OUTPATIENT
Start: 2019-04-27 | End: 2019-04-27

## 2019-04-27 RX ADMIN — ONDANSETRON 4 MG: 2 INJECTION INTRAMUSCULAR; INTRAVENOUS at 08:39

## 2019-04-27 RX ADMIN — FENTANYL CITRATE 50 MCG: 50 INJECTION INTRAMUSCULAR; INTRAVENOUS at 08:41

## 2019-04-27 RX ADMIN — SODIUM CHLORIDE 500 ML: 9 INJECTION, SOLUTION INTRAVENOUS at 10:01

## 2019-04-27 RX ADMIN — IOPAMIDOL 80 ML: 755 INJECTION, SOLUTION INTRAVENOUS at 09:41

## 2019-04-27 ASSESSMENT — ENCOUNTER SYMPTOMS
CONSTIPATION: 0
RHINORRHEA: 0
ABDOMINAL PAIN: 1
SHORTNESS OF BREATH: 0
PHOTOPHOBIA: 0
SINUS PAIN: 0
SORE THROAT: 0
VOMITING: 0
DIARRHEA: 0
COUGH: 0
NAUSEA: 1
SINUS PRESSURE: 0
BACK PAIN: 0
WHEEZING: 0

## 2019-04-27 ASSESSMENT — PAIN DESCRIPTION - DESCRIPTORS: DESCRIPTORS: RADIATING

## 2019-04-27 ASSESSMENT — PAIN DESCRIPTION - LOCATION: LOCATION: ABDOMEN

## 2019-04-27 ASSESSMENT — PAIN DESCRIPTION - FREQUENCY: FREQUENCY: CONTINUOUS

## 2019-04-27 ASSESSMENT — PAIN SCALES - GENERAL
PAINLEVEL_OUTOF10: 10
PAINLEVEL_OUTOF10: 10

## 2019-04-27 ASSESSMENT — PAIN DESCRIPTION - ORIENTATION: ORIENTATION: LEFT;LOWER

## 2019-04-27 NOTE — ED PROVIDER NOTES
Patient is an 80-year-old female who presents with a chief complaint of abdominal pain and nausea. The patient states that for the past week the patient has been complaining of left lower quadrant abdominal pain. 4 days ago the patient was in the emergency department for a similar complaint. She is sent in by her ObGyn at that time to rule out an ovarian torsion, because she was diagnosed with an ovarian cyst they have been monitoring. Patient was found to have an ovarian cyst that was decreased in size from the previous one. The patient states that she has not felt any better since discharge. She admits to left lower quadrant abdominal pain, that she describes as a dull, cramping sensation as nonradiating. She also admits to nausea but no vomiting. Patient is been taking Tylenol for her pain as well as ibuprofen but without relief. Patient denies any fevers, chills, chest pain, shortness of breath, dysuria, hematuria, diarrhea, constipation. The history is provided by the patient. No  was used. Review of Systems   Constitutional: Negative for chills, fatigue and fever. HENT: Negative for congestion, rhinorrhea, sinus pressure, sinus pain, sneezing and sore throat. Eyes: Negative for photophobia and visual disturbance. Respiratory: Negative for cough, shortness of breath and wheezing. Cardiovascular: Negative for chest pain and palpitations. Gastrointestinal: Positive for abdominal pain and nausea. Negative for constipation, diarrhea and vomiting. Genitourinary: Negative for dysuria, frequency and hematuria. Musculoskeletal: Negative for back pain, neck pain and neck stiffness. Skin: Negative for rash and wound. Neurological: Negative for dizziness, light-headedness and headaches. Psychiatric/Behavioral: Negative for agitation, behavioral problems and confusion. Physical Exam   Constitutional: She is oriented to person, place, and time.  She appears well-developed and well-nourished. No distress. HENT:   Head: Normocephalic and atraumatic. Eyes: Conjunctivae are normal. Right eye exhibits no discharge. Left eye exhibits no discharge. No scleral icterus. Neck: Normal range of motion. Neck supple. Cardiovascular: Normal rate and regular rhythm. No murmur heard. Pulmonary/Chest: Effort normal and breath sounds normal. No stridor. No respiratory distress. Abdominal: Soft. Bowel sounds are normal. She exhibits no distension. There is tenderness in the left lower quadrant. Mild tenderness of the left lower quadrant, and just above the left iliac crest to palpation. Abdomen is soft, nondistended. No guarding or rigidity. Musculoskeletal: Normal range of motion. She exhibits no edema or tenderness. Lymphadenopathy:     She has no cervical adenopathy. Neurological: She is alert and oriented to person, place, and time. No cranial nerve deficit. Coordination normal.   Skin: Skin is warm. Capillary refill takes less than 2 seconds. No rash noted. She is not diaphoretic. No erythema. Psychiatric: She has a normal mood and affect. Her behavior is normal.       Procedures    MDM         --------------------------------------------- PAST HISTORY ---------------------------------------------  Past Medical History:  has a past medical history of Arthritis, History of blood transfusion, Hyperlipidemia, Hypertension, Rheumatoid arthritis(714.0), and Thyroid disease. Past Surgical History:  has a past surgical history that includes Carpal tunnel release; Cholecystectomy; joint replacement; and Inguinal hernia repair (Right). Social History:  reports that she has never smoked. She has never used smokeless tobacco. She reports that she does not drink alcohol or use drugs. Family History: family history includes Heart Disease in her brother. The patients home medications have been reviewed. Allergies: Levofloxacin;  Bactrim [sulfamethoxazole-trimethoprim]; Erythromycin; Amoxicillin; Celebrex [celecoxib]; Clinoril [sulindac];  Codeine; and Morphine    -------------------------------------------------- RESULTS -------------------------------------------------  Labs:  Results for orders placed or performed during the hospital encounter of 04/27/19   CBC Auto Differential   Result Value Ref Range    WBC 12.9 (H) 4.5 - 11.5 E9/L    RBC 4.01 3.50 - 5.50 E12/L    Hemoglobin 12.1 11.5 - 15.5 g/dL    Hematocrit 36.9 34.0 - 48.0 %    MCV 92.0 80.0 - 99.9 fL    MCH 30.2 26.0 - 35.0 pg    MCHC 32.8 32.0 - 34.5 %    RDW 13.2 11.5 - 15.0 fL    Platelets 146 396 - 084 E9/L    MPV 9.0 7.0 - 12.0 fL    Neutrophils % 85.4 (H) 43.0 - 80.0 %    Immature Granulocytes % 0.5 0.0 - 5.0 %    Lymphocytes % 5.6 (L) 20.0 - 42.0 %    Monocytes % 8.0 2.0 - 12.0 %    Eosinophils % 0.2 0.0 - 6.0 %    Basophils % 0.3 0.0 - 2.0 %    Neutrophils # 11.05 (H) 1.80 - 7.30 E9/L    Immature Granulocytes # 0.06 E9/L    Lymphocytes # 0.73 (L) 1.50 - 4.00 E9/L    Monocytes # 1.03 (H) 0.10 - 0.95 E9/L    Eosinophils # 0.03 (L) 0.05 - 0.50 E9/L    Basophils # 0.04 0.00 - 0.20 E9/L   Comprehensive Metabolic Panel   Result Value Ref Range    Sodium 127 (L) 132 - 146 mmol/L    Potassium 4.3 3.5 - 5.0 mmol/L    Chloride 90 (L) 98 - 107 mmol/L    CO2 23 22 - 29 mmol/L    Anion Gap 14 7 - 16 mmol/L    Glucose 109 (H) 74 - 99 mg/dL    BUN 19 8 - 23 mg/dL    CREATININE 0.7 0.5 - 1.0 mg/dL    GFR Non-African American >60 >=60 mL/min/1.73    GFR African American >60     Calcium 9.5 8.6 - 10.2 mg/dL    Total Protein 7.0 6.4 - 8.3 g/dL    Alb 3.9 3.5 - 5.2 g/dL    Total Bilirubin 0.5 0.0 - 1.2 mg/dL    Alkaline Phosphatase 59 35 - 104 U/L    ALT 11 0 - 32 U/L    AST 22 0 - 31 U/L   Lactic Acid, Plasma   Result Value Ref Range    Lactic Acid 2.1 0.5 - 2.2 mmol/L   Lipase   Result Value Ref Range    Lipase 41 13 - 60 U/L   Urinalysis   Result Value Ref Range    Color, UA Yellow Straw/Yellow Clarity, UA Clear Clear    Glucose, Ur Negative Negative mg/dL    Bilirubin Urine Negative Negative    Ketones, Urine Negative Negative mg/dL    Specific Gravity, UA 1.015 1.005 - 1.030    Blood, Urine Negative Negative    pH, UA 6.5 5.0 - 9.0    Protein, UA Negative Negative mg/dL    Urobilinogen, Urine 0.2 <2.0 E.U./dL    Nitrite, Urine Negative Negative    Leukocyte Esterase, Urine Negative Negative       Radiology:  CT ABDOMEN PELVIS W IV CONTRAST Additional Contrast? None   Final Result   1. Stable left ovarian cyst.   2. Negative CT scan of the abdomen or pelvis for etiology of the   patient's abdominal pain. CT Lumbar Spine WO Contrast   Final Result   Severe degenerative changes as described above. No   evidence of acute compression fracture.          ------------------------- NURSING NOTES AND VITALS REVIEWED ---------------------------  Date / Time Roomed:  4/27/2019  7:42 AM  ED Bed Assignment:  08/08    The nursing notes within the ED encounter and vital signs as below have been reviewed. BP (!) 170/76 Comment: manual; history of hypertension; takes BP medication  Pulse 59   Temp 98.2 °F (36.8 °C) (Oral)   Resp 28   Ht 4' 11\" (1.499 m)   Wt 106 lb 8 oz (48.3 kg)   SpO2 96%   BMI 21.51 kg/m²   Oxygen Saturation Interpretation: Normal      ------------------------------------------ PROGRESS NOTES ------------------------------------------  Patient presented with left lower quadrant abdominal tenderness to palpation. CT did not reveal any diverticulitis or abscess. The patient does have spinal stenosis and degenerative changes. Patient has no increase in the size of her ovarian cyst. The patient will be discharged home with Vicodin. Her son is a pharmacist and is at bedside, he states that he will monitor the patient when she is taking the Vicodin. No further questions at this time. She will follow-up with her PCP.     I have spoken with the patient and discussed todays results, in addition to providing specific details for the plan of care and counseling regarding the diagnosis and prognosis. Their questions are answered at this time and they are agreeable with the plan. I discussed at length with them reasons for immediate return here for re evaluation. They will followup with primary care by calling their office tomorrow. --------------------------------- ADDITIONAL PROVIDER NOTES ---------------------------------  At this time the patient is without objective evidence of an acute process requiring hospitalization or inpatient management. They have remained hemodynamically stable throughout their entire ED visit and are stable for discharge with outpatient follow-up. The plan has been discussed in detail and they are aware of the specific conditions for emergent return, as well as the importance of follow-up. Discharge Medication List as of 4/27/2019 10:44 AM      START taking these medications    Details   HYDROcodone-acetaminophen (NORCO) 5-325 MG per tablet Take 1 tablet by mouth every 4 hours as needed for Pain for up to 5 days. Intended supply: 5 days. Take lowest dose possible to manage pain, Disp-30 tablet, R-0Print             Diagnosis:  1. Left flank pain    2. Leukocytosis, unspecified type    3. Current chronic use of systemic steroids    4. Arthritis        Disposition:  Patient's disposition: Discharge to home  Patient's condition is stable.            Tj Kelly DO  Resident  04/27/19 3469

## 2019-06-27 ENCOUNTER — HOSPITAL ENCOUNTER (EMERGENCY)
Age: 84
Discharge: HOME | End: 2019-06-27
Payer: MEDICARE

## 2019-06-27 ENCOUNTER — APPOINTMENT (OUTPATIENT)
Dept: CT IMAGING | Age: 84
End: 2019-06-27
Payer: MEDICARE

## 2019-06-27 VITALS
OXYGEN SATURATION: 96 % | SYSTOLIC BLOOD PRESSURE: 178 MMHG | BODY MASS INDEX: 20.76 KG/M2 | HEIGHT: 59 IN | WEIGHT: 103 LBS | DIASTOLIC BLOOD PRESSURE: 81 MMHG | RESPIRATION RATE: 18 BRPM | TEMPERATURE: 98.4 F | HEART RATE: 71 BPM

## 2019-06-27 DIAGNOSIS — R10.9 FLANK PAIN: Primary | ICD-10-CM

## 2019-06-27 DIAGNOSIS — R10.32 LEFT LOWER QUADRANT PAIN: ICD-10-CM

## 2019-06-27 LAB
ALBUMIN SERPL-MCNC: 3.4 G/DL (ref 3.5–5.2)
ALP BLD-CCNC: 61 U/L (ref 35–104)
ALT SERPL-CCNC: 10 U/L (ref 0–32)
ANION GAP SERPL CALCULATED.3IONS-SCNC: 11 MMOL/L (ref 7–16)
AST SERPL-CCNC: 18 U/L (ref 0–31)
BACTERIA: NORMAL /HPF
BASOPHILS ABSOLUTE: 0.02 E9/L (ref 0–0.2)
BASOPHILS RELATIVE PERCENT: 0.2 % (ref 0–2)
BILIRUB SERPL-MCNC: 0.7 MG/DL (ref 0–1.2)
BILIRUBIN URINE: NEGATIVE
BLOOD, URINE: NEGATIVE
BUN BLDV-MCNC: 18 MG/DL (ref 8–23)
CALCIUM SERPL-MCNC: 7.7 MG/DL (ref 8.6–10.2)
CHLORIDE BLD-SCNC: 97 MMOL/L (ref 98–107)
CLARITY: CLEAR
CO2: 21 MMOL/L (ref 22–29)
COLOR: YELLOW
CREAT SERPL-MCNC: 0.7 MG/DL (ref 0.5–1)
EOSINOPHILS ABSOLUTE: 0.12 E9/L (ref 0.05–0.5)
EOSINOPHILS RELATIVE PERCENT: 1.1 % (ref 0–6)
GFR AFRICAN AMERICAN: >60
GFR NON-AFRICAN AMERICAN: >60 ML/MIN/1.73
GLUCOSE BLD-MCNC: 80 MG/DL (ref 74–99)
GLUCOSE URINE: NEGATIVE MG/DL
HCT VFR BLD CALC: 36.1 % (ref 34–48)
HEMOGLOBIN: 12.1 G/DL (ref 11.5–15.5)
IMMATURE GRANULOCYTES #: 0.05 E9/L
IMMATURE GRANULOCYTES %: 0.5 % (ref 0–5)
KETONES, URINE: ABNORMAL MG/DL
LACTIC ACID: 1.1 MMOL/L (ref 0.5–2.2)
LEUKOCYTE ESTERASE, URINE: ABNORMAL
LIPASE: 34 U/L (ref 13–60)
LYMPHOCYTES ABSOLUTE: 1.19 E9/L (ref 1.5–4)
LYMPHOCYTES RELATIVE PERCENT: 11.3 % (ref 20–42)
MCH RBC QN AUTO: 30.3 PG (ref 26–35)
MCHC RBC AUTO-ENTMCNC: 33.5 % (ref 32–34.5)
MCV RBC AUTO: 90.3 FL (ref 80–99.9)
MONOCYTES ABSOLUTE: 1.07 E9/L (ref 0.1–0.95)
MONOCYTES RELATIVE PERCENT: 10.1 % (ref 2–12)
NEUTROPHILS ABSOLUTE: 8.12 E9/L (ref 1.8–7.3)
NEUTROPHILS RELATIVE PERCENT: 76.8 % (ref 43–80)
NITRITE, URINE: NEGATIVE
PDW BLD-RTO: 13.9 FL (ref 11.5–15)
PH UA: 7 (ref 5–9)
PLATELET # BLD: 354 E9/L (ref 130–450)
PMV BLD AUTO: 9.3 FL (ref 7–12)
POTASSIUM SERPL-SCNC: 3.9 MMOL/L (ref 3.5–5)
PROTEIN UA: NEGATIVE MG/DL
RBC # BLD: 4 E12/L (ref 3.5–5.5)
RBC UA: NORMAL /HPF (ref 0–2)
REASON FOR REJECTION: NORMAL
REJECTED TEST: NORMAL
SODIUM BLD-SCNC: 129 MMOL/L (ref 132–146)
SPECIFIC GRAVITY UA: 1.01 (ref 1–1.03)
TOTAL PROTEIN: 5.5 G/DL (ref 6.4–8.3)
UROBILINOGEN, URINE: 2 E.U./DL
WBC # BLD: 10.6 E9/L (ref 4.5–11.5)
WBC UA: NORMAL /HPF (ref 0–5)

## 2019-06-27 PROCEDURE — 81001 URINALYSIS AUTO W/SCOPE: CPT

## 2019-06-27 PROCEDURE — 74177 CT ABD & PELVIS W/CONTRAST: CPT

## 2019-06-27 PROCEDURE — 2580000003 HC RX 258: Performed by: PHYSICIAN ASSISTANT

## 2019-06-27 PROCEDURE — 36415 COLL VENOUS BLD VENIPUNCTURE: CPT

## 2019-06-27 PROCEDURE — 83605 ASSAY OF LACTIC ACID: CPT

## 2019-06-27 PROCEDURE — 6360000002 HC RX W HCPCS: Performed by: PHYSICIAN ASSISTANT

## 2019-06-27 PROCEDURE — 6360000004 HC RX CONTRAST MEDICATION: Performed by: RADIOLOGY

## 2019-06-27 PROCEDURE — 83690 ASSAY OF LIPASE: CPT

## 2019-06-27 PROCEDURE — 99284 EMERGENCY DEPT VISIT MOD MDM: CPT

## 2019-06-27 PROCEDURE — 80053 COMPREHEN METABOLIC PANEL: CPT

## 2019-06-27 PROCEDURE — 85025 COMPLETE CBC W/AUTO DIFF WBC: CPT

## 2019-06-27 PROCEDURE — 87088 URINE BACTERIA CULTURE: CPT

## 2019-06-27 PROCEDURE — 96374 THER/PROPH/DIAG INJ IV PUSH: CPT

## 2019-06-27 RX ORDER — FENTANYL CITRATE 50 UG/ML
25 INJECTION, SOLUTION INTRAMUSCULAR; INTRAVENOUS ONCE
Status: COMPLETED | OUTPATIENT
Start: 2019-06-27 | End: 2019-06-27

## 2019-06-27 RX ORDER — ACETAMINOPHEN AND CODEINE PHOSPHATE 300; 30 MG/1; MG/1
1 TABLET ORAL EVERY 4 HOURS PRN
Qty: 10 TABLET | Refills: 0 | Status: SHIPPED | OUTPATIENT
Start: 2019-06-27 | End: 2019-06-30

## 2019-06-27 RX ORDER — SODIUM CHLORIDE 0.9 % (FLUSH) 0.9 %
SYRINGE (ML) INJECTION
Status: DISCONTINUED
Start: 2019-06-27 | End: 2019-06-28 | Stop reason: HOSPADM

## 2019-06-27 RX ORDER — 0.9 % SODIUM CHLORIDE 0.9 %
500 INTRAVENOUS SOLUTION INTRAVENOUS ONCE
Status: COMPLETED | OUTPATIENT
Start: 2019-06-27 | End: 2019-06-27

## 2019-06-27 RX ADMIN — FENTANYL CITRATE 25 MCG: 50 INJECTION INTRAMUSCULAR; INTRAVENOUS at 21:10

## 2019-06-27 RX ADMIN — SODIUM CHLORIDE 500 ML: 9 INJECTION, SOLUTION INTRAVENOUS at 21:10

## 2019-06-27 RX ADMIN — IOPAMIDOL 80 ML: 755 INJECTION, SOLUTION INTRAVENOUS at 22:37

## 2019-06-27 ASSESSMENT — PAIN DESCRIPTION - LOCATION: LOCATION: FLANK;BACK

## 2019-06-27 ASSESSMENT — PAIN SCALES - GENERAL: PAINLEVEL_OUTOF10: 10

## 2019-06-28 NOTE — ED PROVIDER NOTES
Independent MLP  HPI:  6/27/19, Time: 8:46 PM         Isaura Garcia is a 80 y.o. female presenting to the ED for lft flank pain , beginning 7 days  ago. The complaint has been persistent, moderate in severity, and worsened by movement of Back . patient comes in with complaint of right flank pain right-sided abdominal pain. She states that started 6 days ago after opening a window pain is progressively worse. Pain is worse with movement. She states she has had nausea denies any vomiting diarrhea constipation family member states she was given a enema and had a bowel movement earlier today. She denies any urinary frequency urgency burning. No fever chills. No black tarry or bloody stools. Her daughter-in-law states she has frequent falls at home she is [facility] and visiting nurse from staying with her in the evenings. Review of Systems:   Pertinent positives and negatives are stated within HPI, all other systems reviewed and are negative.          --------------------------------------------- PAST HISTORY ---------------------------------------------  Past Medical History:  has a past medical history of Arthritis, History of blood transfusion, Hyperlipidemia, Hypertension, Rheumatoid arthritis(714.0), and Thyroid disease. Past Surgical History:  has a past surgical history that includes Carpal tunnel release; Cholecystectomy; joint replacement; and Inguinal hernia repair (Right). Social History:  reports that she has never smoked. She has never used smokeless tobacco. She reports that she does not drink alcohol or use drugs. Family History: family history includes Heart Disease in her brother. The patients home medications have been reviewed. Allergies: Levofloxacin; Bactrim [sulfamethoxazole-trimethoprim]; Erythromycin; Amoxicillin; Celebrex [celecoxib]; Clinoril [sulindac];  Codeine; and Morphine    -------------------------------------------------- RESULTS 132 - 146 mmol/L    Potassium 3.9 3.5 - 5.0 mmol/L    Chloride 97 (L) 98 - 107 mmol/L    CO2 21 (L) 22 - 29 mmol/L    Anion Gap 11 7 - 16 mmol/L    Glucose 80 74 - 99 mg/dL    BUN 18 8 - 23 mg/dL    CREATININE 0.7 0.5 - 1.0 mg/dL    GFR Non-African American >60 >=60 mL/min/1.73    GFR African American >60     Calcium 7.7 (L) 8.6 - 10.2 mg/dL    Total Protein 5.5 (L) 6.4 - 8.3 g/dL    Alb 3.4 (L) 3.5 - 5.2 g/dL    Total Bilirubin 0.7 0.0 - 1.2 mg/dL    Alkaline Phosphatase 61 35 - 104 U/L    ALT 10 0 - 32 U/L    AST 18 0 - 31 U/L       RADIOLOGY:  Interpreted by Radiologist.  CT ABDOMEN PELVIS W IV CONTRAST Additional Contrast? None   Final Result   1. No acute intra-abdominal or pelvic pathology. 2. Stable left ovarian cyst.   3. Multilevel lumbar spondylosis.          ------------------------- NURSING NOTES AND VITALS REVIEWED ---------------------------   The nursing notes within the ED encounter and vital signs as below have been reviewed. BP (!) 178/81   Pulse 71   Temp 98.4 °F (36.9 °C) (Oral)   Resp 18   Ht 4' 11\" (1.499 m)   Wt 103 lb (46.7 kg)   SpO2 96%   BMI 20.80 kg/m²   Oxygen Saturation Interpretation: Normal      ---------------------------------------------------PHYSICAL EXAM--------------------------------------      Constitutional/General: Alert and oriented x3, well appearing, non toxic in NAD  Head: Normocephalic and atraumatic  Eyes: PERRL, EOMI  Mouth: Oropharynx clear, handling secretions, no trismus  Neck: Supple, full ROM,   Pulmonary: Lungs clear to auscultation bilaterally, no wheezes, rales, or rhonchi. Not in respiratory distress  Cardiovascular:  Regular rate and rhythm, no murmurs, gallops, or rubs. 2+ distal pulses  Abdomen: Soft, Tender  left lower quadrant, non distended, Left CVA tenderness  Extremities: Moves all extremities x 4. Warm and well perfused no bony point tenderness.   Skin: warm and dry without rash  Neurologic: GCS 15,  Psych: Normal Affect      ------------------------------ ED COURSE/MEDICAL DECISION MAKING----------------------  Medications   sodium chloride flush 0.9 % injection (has no administration in time range)   fentaNYL (SUBLIMAZE) injection 25 mcg (25 mcg Intravenous Given 6/27/19 2110)   0.9 % sodium chloride bolus (0 mLs Intravenous Stopped 6/27/19 2145)   iopamidol (ISOVUE-370) 76 % injection 80 mL (80 mLs Intravenous Given 6/27/19 2237)         ED COURSE:    2150 Pain improved       Medical Decision Making:    Patient came in with complaint of left flank pain after pushing sliding door. She said she was having some left lower quadrant pain earlier today with some nausea. CT of the abdomen shows no acute findings. Daughter states she did have a large bowel movement earlier today. Has history of ovarian cyst which is unchanged. T of the spine shows degenerative changes no fractures. She had no tenderness to palpation of the hip. Counseling: The emergency provider has spoken with the patient and discussed todays results, in addition to providing specific details for the plan of care and counseling regarding the diagnosis and prognosis. Questions are answered at this time and they are agreeable with the plan.      --------------------------------- IMPRESSION AND DISPOSITION ---------------------------------    IMPRESSION  1. Flank pain    2. Left lower quadrant pain        DISPOSITION  Disposition: Discharge to home  Patient condition is good      NOTE: This report was transcribed using voice recognition software.  Every effort was made to ensure accuracy; however, inadvertent computerized transcription errors may be present     Marvel Mckinney  06/27/19 2489

## 2019-06-30 LAB — URINE CULTURE, ROUTINE: NORMAL

## 2019-07-01 ENCOUNTER — TELEPHONE (OUTPATIENT)
Dept: PALLATIVE CARE | Age: 84
End: 2019-07-01

## 2019-12-11 ENCOUNTER — HOSPITAL ENCOUNTER (OUTPATIENT)
Dept: ULTRASOUND IMAGING | Age: 84
Discharge: HOME | End: 2019-12-11
Payer: MEDICARE

## 2019-12-11 DIAGNOSIS — N83.8 OVARIAN MASS: ICD-10-CM

## 2019-12-11 PROCEDURE — 76856 US EXAM PELVIC COMPLETE: CPT

## 2020-07-21 ENCOUNTER — APPOINTMENT (OUTPATIENT)
Dept: CT IMAGING | Age: 85
DRG: 378 | End: 2020-07-21
Payer: MEDICARE

## 2020-07-21 ENCOUNTER — APPOINTMENT (OUTPATIENT)
Dept: GENERAL RADIOLOGY | Age: 85
DRG: 378 | End: 2020-07-21
Payer: MEDICARE

## 2020-07-21 ENCOUNTER — HOSPITAL ENCOUNTER (INPATIENT)
Age: 85
LOS: 5 days | Discharge: NURSING, REHAB OR LONG-TERM CARE | DRG: 378 | End: 2020-07-26
Attending: EMERGENCY MEDICINE | Admitting: INTERNAL MEDICINE
Payer: MEDICARE

## 2020-07-21 ENCOUNTER — ANESTHESIA EVENT (OUTPATIENT)
Dept: ENDOSCOPY | Age: 85
DRG: 378 | End: 2020-07-21
Payer: MEDICARE

## 2020-07-21 PROBLEM — D64.9 ANEMIA: Status: ACTIVE | Noted: 2020-07-21

## 2020-07-21 LAB
ABO/RH: NORMAL
ALBUMIN SERPL-MCNC: 3.3 G/DL (ref 3.5–5.2)
ALP BLD-CCNC: 47 U/L (ref 35–104)
ALT SERPL-CCNC: 14 U/L (ref 0–32)
ANION GAP SERPL CALCULATED.3IONS-SCNC: 15 MMOL/L (ref 7–16)
ANTIBODY SCREEN: NORMAL
AST SERPL-CCNC: 24 U/L (ref 0–31)
BASOPHILS ABSOLUTE: 0.02 E9/L (ref 0–0.2)
BASOPHILS RELATIVE PERCENT: 0.1 % (ref 0–2)
BILIRUB SERPL-MCNC: 0.7 MG/DL (ref 0–1.2)
BILIRUBIN URINE: NEGATIVE
BLOOD, URINE: NEGATIVE
BUN BLDV-MCNC: 77 MG/DL (ref 8–23)
CALCIUM SERPL-MCNC: 8.9 MG/DL (ref 8.6–10.2)
CHLORIDE BLD-SCNC: 96 MMOL/L (ref 98–107)
CLARITY: CLEAR
CO2: 21 MMOL/L (ref 22–29)
COLOR: YELLOW
CREAT SERPL-MCNC: 0.9 MG/DL (ref 0.5–1)
EKG ATRIAL RATE: 84 BPM
EKG P AXIS: 58 DEGREES
EKG P-R INTERVAL: 142 MS
EKG Q-T INTERVAL: 382 MS
EKG QRS DURATION: 72 MS
EKG QTC CALCULATION (BAZETT): 451 MS
EKG R AXIS: 26 DEGREES
EKG T AXIS: 70 DEGREES
EKG VENTRICULAR RATE: 84 BPM
EOSINOPHILS ABSOLUTE: 0.05 E9/L (ref 0.05–0.5)
EOSINOPHILS RELATIVE PERCENT: 0.3 % (ref 0–6)
FERRITIN: 151 NG/ML
GFR AFRICAN AMERICAN: >60
GFR NON-AFRICAN AMERICAN: 59 ML/MIN/1.73
GLUCOSE BLD-MCNC: 116 MG/DL (ref 74–99)
GLUCOSE URINE: NEGATIVE MG/DL
HCT VFR BLD CALC: 22.4 % (ref 34–48)
HEMOGLOBIN: 7.3 G/DL (ref 11.5–15.5)
IMMATURE GRANULOCYTES #: 0.15 E9/L
IMMATURE GRANULOCYTES %: 1 % (ref 0–5)
INR BLD: 1
IRON SATURATION: 32 % (ref 15–50)
IRON: 75 MCG/DL (ref 37–145)
KETONES, URINE: NEGATIVE MG/DL
LEUKOCYTE ESTERASE, URINE: NEGATIVE
LYMPHOCYTES ABSOLUTE: 0.71 E9/L (ref 1.5–4)
LYMPHOCYTES RELATIVE PERCENT: 4.7 % (ref 20–42)
MCH RBC QN AUTO: 32.2 PG (ref 26–35)
MCHC RBC AUTO-ENTMCNC: 32.6 % (ref 32–34.5)
MCV RBC AUTO: 98.7 FL (ref 80–99.9)
MONOCYTES ABSOLUTE: 0.6 E9/L (ref 0.1–0.95)
MONOCYTES RELATIVE PERCENT: 4 % (ref 2–12)
NEUTROPHILS ABSOLUTE: 13.53 E9/L (ref 1.8–7.3)
NEUTROPHILS RELATIVE PERCENT: 89.9 % (ref 43–80)
NITRITE, URINE: NEGATIVE
PDW BLD-RTO: 13.3 FL (ref 11.5–15)
PH UA: 6 (ref 5–9)
PLATELET # BLD: 250 E9/L (ref 130–450)
PMV BLD AUTO: 9.1 FL (ref 7–12)
POTASSIUM REFLEX MAGNESIUM: 4.1 MMOL/L (ref 3.5–5)
PRO-BNP: 1269 PG/ML (ref 0–450)
PROTEIN UA: NEGATIVE MG/DL
PROTHROMBIN TIME: 12 SEC (ref 9.3–12.4)
RBC # BLD: 2.27 E12/L (ref 3.5–5.5)
SODIUM BLD-SCNC: 132 MMOL/L (ref 132–146)
SPECIFIC GRAVITY UA: 1.01 (ref 1–1.03)
TOTAL IRON BINDING CAPACITY: 235 MCG/DL (ref 250–450)
TOTAL PROTEIN: 5.4 G/DL (ref 6.4–8.3)
TROPONIN: 0.04 NG/ML (ref 0–0.03)
UROBILINOGEN, URINE: 0.2 E.U./DL
WBC # BLD: 15.1 E9/L (ref 4.5–11.5)

## 2020-07-21 PROCEDURE — 93010 ELECTROCARDIOGRAM REPORT: CPT | Performed by: INTERNAL MEDICINE

## 2020-07-21 PROCEDURE — 6370000000 HC RX 637 (ALT 250 FOR IP): Performed by: STUDENT IN AN ORGANIZED HEALTH CARE EDUCATION/TRAINING PROGRAM

## 2020-07-21 PROCEDURE — 84484 ASSAY OF TROPONIN QUANT: CPT

## 2020-07-21 PROCEDURE — 72125 CT NECK SPINE W/O DYE: CPT

## 2020-07-21 PROCEDURE — 83880 ASSAY OF NATRIURETIC PEPTIDE: CPT

## 2020-07-21 PROCEDURE — 6360000002 HC RX W HCPCS: Performed by: STUDENT IN AN ORGANIZED HEALTH CARE EDUCATION/TRAINING PROGRAM

## 2020-07-21 PROCEDURE — 86901 BLOOD TYPING SEROLOGIC RH(D): CPT

## 2020-07-21 PROCEDURE — 83540 ASSAY OF IRON: CPT

## 2020-07-21 PROCEDURE — 86900 BLOOD TYPING SEROLOGIC ABO: CPT

## 2020-07-21 PROCEDURE — 2580000003 HC RX 258: Performed by: STUDENT IN AN ORGANIZED HEALTH CARE EDUCATION/TRAINING PROGRAM

## 2020-07-21 PROCEDURE — 6360000002 HC RX W HCPCS: Performed by: HOSPITALIST

## 2020-07-21 PROCEDURE — 36430 TRANSFUSION BLD/BLD COMPNT: CPT

## 2020-07-21 PROCEDURE — 86850 RBC ANTIBODY SCREEN: CPT

## 2020-07-21 PROCEDURE — 93005 ELECTROCARDIOGRAM TRACING: CPT | Performed by: STUDENT IN AN ORGANIZED HEALTH CARE EDUCATION/TRAINING PROGRAM

## 2020-07-21 PROCEDURE — 71045 X-RAY EXAM CHEST 1 VIEW: CPT

## 2020-07-21 PROCEDURE — 81003 URINALYSIS AUTO W/O SCOPE: CPT

## 2020-07-21 PROCEDURE — 85025 COMPLETE CBC W/AUTO DIFF WBC: CPT

## 2020-07-21 PROCEDURE — 85610 PROTHROMBIN TIME: CPT

## 2020-07-21 PROCEDURE — 30233N1 TRANSFUSION OF NONAUTOLOGOUS RED BLOOD CELLS INTO PERIPHERAL VEIN, PERCUTANEOUS APPROACH: ICD-10-PCS | Performed by: INTERNAL MEDICINE

## 2020-07-21 PROCEDURE — 86923 COMPATIBILITY TEST ELECTRIC: CPT

## 2020-07-21 PROCEDURE — C9113 INJ PANTOPRAZOLE SODIUM, VIA: HCPCS | Performed by: HOSPITALIST

## 2020-07-21 PROCEDURE — 6370000000 HC RX 637 (ALT 250 FOR IP): Performed by: INTERNAL MEDICINE

## 2020-07-21 PROCEDURE — 70450 CT HEAD/BRAIN W/O DYE: CPT

## 2020-07-21 PROCEDURE — 2580000003 HC RX 258: Performed by: HOSPITALIST

## 2020-07-21 PROCEDURE — 36415 COLL VENOUS BLD VENIPUNCTURE: CPT

## 2020-07-21 PROCEDURE — P9016 RBC LEUKOCYTES REDUCED: HCPCS

## 2020-07-21 PROCEDURE — 82728 ASSAY OF FERRITIN: CPT

## 2020-07-21 PROCEDURE — 83550 IRON BINDING TEST: CPT

## 2020-07-21 PROCEDURE — 80053 COMPREHEN METABOLIC PANEL: CPT

## 2020-07-21 PROCEDURE — C9113 INJ PANTOPRAZOLE SODIUM, VIA: HCPCS | Performed by: STUDENT IN AN ORGANIZED HEALTH CARE EDUCATION/TRAINING PROGRAM

## 2020-07-21 PROCEDURE — 99285 EMERGENCY DEPT VISIT HI MDM: CPT

## 2020-07-21 PROCEDURE — 1200000000 HC SEMI PRIVATE

## 2020-07-21 RX ORDER — 0.9 % SODIUM CHLORIDE 0.9 %
20 INTRAVENOUS SOLUTION INTRAVENOUS ONCE
Status: COMPLETED | OUTPATIENT
Start: 2020-07-21 | End: 2020-07-21

## 2020-07-21 RX ORDER — GABAPENTIN 300 MG/1
300 CAPSULE ORAL NIGHTLY
Status: DISCONTINUED | OUTPATIENT
Start: 2020-07-21 | End: 2020-07-26 | Stop reason: HOSPADM

## 2020-07-21 RX ORDER — LOSARTAN POTASSIUM 50 MG/1
50 TABLET ORAL DAILY
Status: DISCONTINUED | OUTPATIENT
Start: 2020-07-21 | End: 2020-07-25

## 2020-07-21 RX ORDER — SODIUM CHLORIDE 0.9 % (FLUSH) 0.9 %
10 SYRINGE (ML) INJECTION PRN
Status: DISCONTINUED | OUTPATIENT
Start: 2020-07-21 | End: 2020-07-22 | Stop reason: SDUPTHER

## 2020-07-21 RX ORDER — METOPROLOL TARTRATE 50 MG/1
50 TABLET, FILM COATED ORAL DAILY
Status: DISCONTINUED | OUTPATIENT
Start: 2020-07-21 | End: 2020-07-26 | Stop reason: HOSPADM

## 2020-07-21 RX ORDER — GABAPENTIN 100 MG/1
100 CAPSULE ORAL 2 TIMES DAILY
COMMUNITY

## 2020-07-21 RX ORDER — ACETAMINOPHEN 325 MG/1
650 TABLET ORAL EVERY 6 HOURS PRN
Status: DISCONTINUED | OUTPATIENT
Start: 2020-07-21 | End: 2020-07-22

## 2020-07-21 RX ORDER — LEVOTHYROXINE SODIUM 0.07 MG/1
75 TABLET ORAL DAILY
Status: DISCONTINUED | OUTPATIENT
Start: 2020-07-22 | End: 2020-07-26 | Stop reason: HOSPADM

## 2020-07-21 RX ORDER — MV-MN/OM3/DHA/EPA/FISH/LUT/ZEA 250-5-1 MG
1 CAPSULE ORAL DAILY
Status: DISCONTINUED | OUTPATIENT
Start: 2020-07-21 | End: 2020-07-21

## 2020-07-21 RX ORDER — GABAPENTIN 100 MG/1
100 CAPSULE ORAL 2 TIMES DAILY
Status: DISCONTINUED | OUTPATIENT
Start: 2020-07-22 | End: 2020-07-26 | Stop reason: HOSPADM

## 2020-07-21 RX ORDER — PANTOPRAZOLE SODIUM 40 MG/10ML
40 INJECTION, POWDER, LYOPHILIZED, FOR SOLUTION INTRAVENOUS ONCE
Status: COMPLETED | OUTPATIENT
Start: 2020-07-21 | End: 2020-07-21

## 2020-07-21 RX ORDER — VITAMIN B COMPLEX
1000 TABLET ORAL DAILY
Status: DISCONTINUED | OUTPATIENT
Start: 2020-07-21 | End: 2020-07-26 | Stop reason: HOSPADM

## 2020-07-21 RX ORDER — CHLORAL HYDRATE 500 MG
1000 CAPSULE ORAL DAILY
Status: DISCONTINUED | OUTPATIENT
Start: 2020-07-21 | End: 2020-07-21

## 2020-07-21 RX ORDER — PANTOPRAZOLE SODIUM 40 MG/10ML
40 INJECTION, POWDER, LYOPHILIZED, FOR SOLUTION INTRAVENOUS ONCE
Status: DISCONTINUED | OUTPATIENT
Start: 2020-07-21 | End: 2020-07-26 | Stop reason: HOSPADM

## 2020-07-21 RX ORDER — GABAPENTIN 300 MG/1
300 CAPSULE ORAL NIGHTLY
Status: ON HOLD | COMMUNITY
End: 2022-01-01 | Stop reason: HOSPADM

## 2020-07-21 RX ORDER — 0.9 % SODIUM CHLORIDE 0.9 %
1000 INTRAVENOUS SOLUTION INTRAVENOUS ONCE
Status: COMPLETED | OUTPATIENT
Start: 2020-07-21 | End: 2020-07-21

## 2020-07-21 RX ORDER — TRAMADOL HYDROCHLORIDE 50 MG/1
50 TABLET ORAL EVERY 12 HOURS PRN
Status: DISCONTINUED | OUTPATIENT
Start: 2020-07-21 | End: 2020-07-26 | Stop reason: HOSPADM

## 2020-07-21 RX ORDER — ACETAMINOPHEN 325 MG/1
650 TABLET ORAL EVERY 4 HOURS PRN
Status: DISCONTINUED | OUTPATIENT
Start: 2020-07-21 | End: 2020-07-26 | Stop reason: HOSPADM

## 2020-07-21 RX ORDER — SODIUM CHLORIDE 0.9 % (FLUSH) 0.9 %
10 SYRINGE (ML) INJECTION EVERY 12 HOURS SCHEDULED
Status: DISCONTINUED | OUTPATIENT
Start: 2020-07-21 | End: 2020-07-22 | Stop reason: SDUPTHER

## 2020-07-21 RX ORDER — SODIUM CHLORIDE 9 MG/ML
10 INJECTION INTRAVENOUS ONCE
Status: DISCONTINUED | OUTPATIENT
Start: 2020-07-21 | End: 2020-07-26 | Stop reason: HOSPADM

## 2020-07-21 RX ADMIN — Medication 10 ML: at 21:55

## 2020-07-21 RX ADMIN — TRAMADOL HYDROCHLORIDE 50 MG: 50 TABLET, FILM COATED ORAL at 23:26

## 2020-07-21 RX ADMIN — ACETAMINOPHEN 650 MG: 325 TABLET, FILM COATED ORAL at 20:03

## 2020-07-21 RX ADMIN — LOSARTAN POTASSIUM 50 MG: 50 TABLET, FILM COATED ORAL at 20:02

## 2020-07-21 RX ADMIN — SODIUM CHLORIDE 8 MG/HR: 9 INJECTION, SOLUTION INTRAVENOUS at 16:20

## 2020-07-21 RX ADMIN — METOPROLOL TARTRATE 50 MG: 50 TABLET, FILM COATED ORAL at 20:03

## 2020-07-21 RX ADMIN — ACETAMINOPHEN 650 MG: 325 TABLET, FILM COATED ORAL at 15:53

## 2020-07-21 RX ADMIN — MELATONIN 1000 UNITS: at 20:03

## 2020-07-21 RX ADMIN — PANTOPRAZOLE SODIUM 40 MG: 40 INJECTION, POWDER, FOR SOLUTION INTRAVENOUS at 15:53

## 2020-07-21 RX ADMIN — GABAPENTIN 300 MG: 300 CAPSULE ORAL at 20:03

## 2020-07-21 RX ADMIN — SODIUM CHLORIDE 1000 ML: 9 INJECTION, SOLUTION INTRAVENOUS at 13:56

## 2020-07-21 RX ADMIN — SODIUM CHLORIDE 20 ML: 9 INJECTION, SOLUTION INTRAVENOUS at 18:00

## 2020-07-21 ASSESSMENT — PAIN DESCRIPTION - LOCATION
LOCATION: CHEST
LOCATION: GENERALIZED

## 2020-07-21 ASSESSMENT — PAIN DESCRIPTION - PAIN TYPE
TYPE: ACUTE PAIN
TYPE: ACUTE PAIN

## 2020-07-21 ASSESSMENT — PAIN SCALES - GENERAL
PAINLEVEL_OUTOF10: 7
PAINLEVEL_OUTOF10: 8
PAINLEVEL_OUTOF10: 3
PAINLEVEL_OUTOF10: 8
PAINLEVEL_OUTOF10: 7
PAINLEVEL_OUTOF10: 9
PAINLEVEL_OUTOF10: 1

## 2020-07-21 ASSESSMENT — ENCOUNTER SYMPTOMS
COUGH: 0
SORE THROAT: 0
SHORTNESS OF BREATH: 0
ABDOMINAL PAIN: 0
DIARRHEA: 0
COLOR CHANGE: 1
WHEEZING: 0
VOMITING: 0
BLOOD IN STOOL: 1
EYE DISCHARGE: 0
SINUS PRESSURE: 0
EYE REDNESS: 0
ABDOMINAL DISTENTION: 0
BACK PAIN: 1
EYE PAIN: 0
NAUSEA: 0

## 2020-07-21 ASSESSMENT — PAIN DESCRIPTION - FREQUENCY: FREQUENCY: INTERMITTENT

## 2020-07-21 ASSESSMENT — PAIN DESCRIPTION - DESCRIPTORS
DESCRIPTORS: ACHING
DESCRIPTORS: THROBBING;SHARP;TIGHTNESS

## 2020-07-21 NOTE — ED PROVIDER NOTES
Chief Complaint   Patient presents with    Melena     X 1 week    Dehydration    Cuong March is a 80-year-old female with a past medical history of rheumatoid arthritis, hypertension, hyperlipidemia who presents today via EMS for multiple falls, weakness, and blood in her stools. Patient states she has noticed dark tarry stools intermittently for the past week, she has been weak and fatigued during this time. Patient states she had a ground-level fall 2 days ago. She states she was walking with her walker, felt very weak, and fell forward landing on her walker and then hitting the ground. Patient presents with multiple bruises noted to her forehead, face, chest, and lower extremity. Patient denies joint pain at this time, she states she has been up and walking with her walker since her fall with no other difficulties. Patient states she is not on blood thinners. She denies fevers, chills, chest pain, shortness of breath, nausea, vomiting, diarrhea. The history is provided by the patient. No  was used. Review of Systems   Constitutional: Positive for fatigue. Negative for chills and fever. HENT: Negative for ear pain, sinus pressure and sore throat. Eyes: Negative for pain, discharge and redness. Respiratory: Negative for cough, shortness of breath and wheezing. Cardiovascular: Negative for chest pain, palpitations and leg swelling. Gastrointestinal: Positive for blood in stool. Negative for abdominal distention, abdominal pain, diarrhea, nausea and vomiting. Genitourinary: Negative for dysuria and frequency. Musculoskeletal: Positive for back pain (chronic - lumbar - paraspinal). Negative for arthralgias, neck pain and neck stiffness. Skin: Positive for color change (bruising). Neurological: Positive for weakness. Negative for numbness and headaches. Hematological: Negative for adenopathy.    Psychiatric/Behavioral: Negative for behavioral problems and confusion. All other systems reviewed and are negative. Physical Exam  Vitals signs and nursing note reviewed. Exam conducted with a chaperone present. Constitutional:       General: She is not in acute distress. Appearance: Normal appearance. She is well-developed. She is not ill-appearing. HENT:      Head: Normocephalic. Raccoon eyes and abrasion present. No laceration. Nose: Nose normal.      Mouth/Throat:      Mouth: Mucous membranes are moist.   Eyes:      Extraocular Movements: Extraocular movements intact. Pupils: Pupils are equal, round, and reactive to light. Neck:      Musculoskeletal: Normal range of motion and neck supple. No neck rigidity or muscular tenderness. Cardiovascular:      Rate and Rhythm: Normal rate and regular rhythm. Pulses: Normal pulses. Heart sounds: Normal heart sounds. Pulmonary:      Effort: Pulmonary effort is normal. No respiratory distress. Breath sounds: Normal breath sounds. No wheezing or rales. Abdominal:      General: Bowel sounds are normal.      Palpations: Abdomen is soft. Tenderness: There is no abdominal tenderness. There is no guarding or rebound. Genitourinary:     Rectum: Guaiac result positive (dark black stools). Musculoskeletal: Normal range of motion. General: No tenderness. Right lower leg: No edema. Left lower leg: No edema. Skin:     General: Skin is warm and dry. Capillary Refill: Capillary refill takes less than 2 seconds. Findings: Bruising present. Comments: Superficial skin tears noted to the forehead  Raccoon eyes  Bruising noted to the anterior chest wall  Bruising to the bilateral lower extremities   Neurological:      General: No focal deficit present. Mental Status: She is alert and oriented to person, place, and time. Cranial Nerves: No cranial nerve deficit. Sensory: No sensory deficit. Motor: No weakness.       Coordination: Coordination normal.      Comments: Muscle strength 5/5 in upper and lower extremities bilaterally  Sensation intact to light touch in upper and lower extremities bilaterally  Alert and oriented x3     Psychiatric:         Mood and Affect: Mood normal.         Behavior: Behavior normal.          Procedures     Labs Reviewed   CBC WITH AUTO DIFFERENTIAL - Abnormal; Notable for the following components:       Result Value    WBC 15.1 (*)     RBC 2.27 (*)     Hemoglobin 7.3 (*)     Hematocrit 22.4 (*)     Neutrophils % 89.9 (*)     Lymphocytes % 4.7 (*)     Neutrophils Absolute 13.53 (*)     Lymphocytes Absolute 0.71 (*)     All other components within normal limits   COMPREHENSIVE METABOLIC PANEL W/ REFLEX TO MG FOR LOW K - Abnormal; Notable for the following components:    Chloride 96 (*)     CO2 21 (*)     Glucose 116 (*)     BUN 77 (*)     Total Protein 5.4 (*)     Alb 3.3 (*)     All other components within normal limits   TROPONIN - Abnormal; Notable for the following components:    Troponin 0.04 (*)     All other components within normal limits   BRAIN NATRIURETIC PEPTIDE - Abnormal; Notable for the following components:    Pro-BNP 1,269 (*)     All other components within normal limits   URINALYSIS   PROTIME-INR   HEMOGLOBIN AND HEMATOCRIT, BLOOD   TYPE AND SCREEN   PREPARE RBC (CROSSMATCH)     CT Head WO Contrast   Final Result   No evidence of intracranial hemorrhage. CT CERVICAL SPINE WO CONTRAST   Final Result   No acute fractures of the cervical spine. XR CHEST PORTABLE   Final Result   No acute process. EKG #1:  Interpreted by emergency department physician unless otherwise noted. Time:  1319    Rate: 84  Rhythm: Sinus. Interpretation: Normal sinus rhythm with sinus arrhythmia, normal axis, SD interval 142, QRS 70, QTc 451.       MDM  Number of Diagnoses or Management Options  Anemia, unspecified type:   Elevated troponin:   Fall from ground level:   Gastrointestinal hemorrhage with melena:   Diagnosis management comments: Is a 35-year-old female presents today for fatigue, melena, and a ground-level fall 2 days ago. Physical exam shows patient alert and oriented x3, she is able answer questions properly. No neurologic deficit noted. Patient has multiple bruising, to forehead, dry her eyes, and her extremities. Due to patient's age, and recent fall CT head and neck was obtained which shows no evidence of acute fracture or bleed. Lab work was obtained. Lab work shows hemoglobin of 7.3, which is down from baseline of 12 which was 1 year ago. Bedside Hemoccult was performed which she is Hemoccult positive and shows dark tarry stools. Abdomen is soft, there is no tenderness. Patient not had any vomiting or nausea. Troponin is mildly elevated 0.04, EKG shows no acute changes, normal sinus rhythm, and appears unchanged from previous tracings, this is unlikely acute cardiac in origin, this is likely related to her volume depletion. Patient was given 1 unit of blood in department as well as 40 of IV Protonix, and 1 L of fluid. Spoke with PCP who agrees to mid. Patient vitals are stable for admission to the floor. Patient is in agreement with this plan. She is in no acute distress. Amount and/or Complexity of Data Reviewed  Clinical lab tests: reviewed  Tests in the radiology section of CPT®: reviewed  Tests in the medicine section of CPT®: reviewed           ED Course as of Jul 21 1543   Tue Jul 21, 2020   1330 Nurse present as chaperone, Hemoccult positive dark stools noted    []   1528 I spoke with Dr. Juhi Godoy who agrees to admit    []      ED Course User Index  [] Angelo Kwan, DO       --------------------------------------------- PAST HISTORY ---------------------------------------------  Past Medical History:  has a past medical history of Arthritis, History of blood transfusion, Hyperlipidemia, Hypertension, Rheumatoid arthritis(714.0), and Thyroid disease.     Past Surgical History:  has a past surgical history that includes Carpal tunnel release; Cholecystectomy; joint replacement; and Inguinal hernia repair (Right). Social History:  reports that she has never smoked. She has never used smokeless tobacco. She reports that she does not drink alcohol or use drugs. Family History: family history includes Heart Disease in her brother. The patients home medications have been reviewed. Allergies: Levofloxacin; Bactrim [sulfamethoxazole-trimethoprim]; Erythromycin; Amoxicillin; Celebrex [celecoxib]; Clinoril [sulindac];  Codeine; and Morphine    -------------------------------------------------- RESULTS -------------------------------------------------    LABS:  Results for orders placed or performed during the hospital encounter of 07/21/20   CBC Auto Differential   Result Value Ref Range    WBC 15.1 (H) 4.5 - 11.5 E9/L    RBC 2.27 (L) 3.50 - 5.50 E12/L    Hemoglobin 7.3 (L) 11.5 - 15.5 g/dL    Hematocrit 22.4 (L) 34.0 - 48.0 %    MCV 98.7 80.0 - 99.9 fL    MCH 32.2 26.0 - 35.0 pg    MCHC 32.6 32.0 - 34.5 %    RDW 13.3 11.5 - 15.0 fL    Platelets 777 646 - 366 E9/L    MPV 9.1 7.0 - 12.0 fL    Neutrophils % 89.9 (H) 43.0 - 80.0 %    Immature Granulocytes % 1.0 0.0 - 5.0 %    Lymphocytes % 4.7 (L) 20.0 - 42.0 %    Monocytes % 4.0 2.0 - 12.0 %    Eosinophils % 0.3 0.0 - 6.0 %    Basophils % 0.1 0.0 - 2.0 %    Neutrophils Absolute 13.53 (H) 1.80 - 7.30 E9/L    Immature Granulocytes # 0.15 E9/L    Lymphocytes Absolute 0.71 (L) 1.50 - 4.00 E9/L    Monocytes Absolute 0.60 0.10 - 0.95 E9/L    Eosinophils Absolute 0.05 0.05 - 0.50 E9/L    Basophils Absolute 0.02 0.00 - 0.20 E9/L   Comprehensive Metabolic Panel w/ Reflex to MG   Result Value Ref Range    Sodium 132 132 - 146 mmol/L    Potassium reflex Magnesium 4.1 3.5 - 5.0 mmol/L    Chloride 96 (L) 98 - 107 mmol/L    CO2 21 (L) 22 - 29 mmol/L    Anion Gap 15 7 - 16 mmol/L    Glucose 116 (H) 74 - 99 mg/dL    BUN 77 (H) 8 - 23 Vitals for the past 24 hrs:   BP Temp Temp src Pulse Resp SpO2 Height Weight   07/21/20 1251 109/71 98.3 °F (36.8 °C) Oral 83 28 93 % 4' 11\" (1.499 m) 108 lb (49 kg)       Oxygen Saturation Interpretation: Normal    ------------------------------------------ PROGRESS NOTES ------------------------------------------  Re-evaluation(s):  Time: 2998  Patients symptoms show no change  Repeat physical examination is not changed    Counseling:  I have spoken with the patient and discussed todays results, in addition to providing specific details for the plan of care and counseling regarding the diagnosis and prognosis. Their questions are answered at this time and they are agreeable with the plan of admission.    --------------------------------- ADDITIONAL PROVIDER NOTES ---------------------------------  Consultations:  Time: 1528. Spoke with Dr. Lurdes Ruiz. Discussed case. They will admit the patient. This patient's ED course included: a personal history and physicial examination, re-evaluation prior to disposition, multiple bedside re-evaluations, IV medications, cardiac monitoring, continuous pulse oximetry and complex medical decision making and emergency management    This patient has remained hemodynamically stable during their ED course. Diagnosis:  1. Anemia, unspecified type    2. Gastrointestinal hemorrhage with melena    3. Fall from ground level    4. Elevated troponin        Disposition:  Patient's disposition: Admit to med/surg floor  Patient's condition is fair.          Asya Wolfe DO  Resident  07/21/20 8837

## 2020-07-21 NOTE — ED NOTES
Nurse to nurse called and given to [facility] for room 340-2.      Kiley Bustillo RN  07/21/20 2767

## 2020-07-21 NOTE — ANESTHESIA PRE PROCEDURE
Department of Anesthesiology  Preprocedure Note       Name:  Sarabjit Dior   Age:  80 y.o.  :  1929                                          MRN:  [id number]         Date:  2020      Surgeon: Marivel Amaya):  Veronica Unger MD    Procedure: Procedure(s):  EGD ESOPHAGOGASTRODUODENOSCOPY    Medications prior to admission:   Prior to Admission medications    Medication Sig Start Date End Date Taking? Authorizing Provider   gabapentin (NEURONTIN) 100 MG capsule Take 100 mg by mouth 2 times daily at 0800 and 1400. Yes Historical Provider, MD   gabapentin (NEURONTIN) 300 MG capsule Take 300 mg by mouth nightly. Yes Historical Provider, MD   polyethylene glycol (GLYCOLAX) powder Take 17 g by mouth daily    Historical Provider, MD   metoprolol (LOPRESSOR) 100 MG tablet Take 100 mg by mouth    Historical Provider, MD   hydroxychloroquine (PLAQUENIL) 200 MG tablet Take 200 mg by mouth daily    Historical Provider, MD   predniSONE (DELTASONE) 2.5 MG tablet Take 2.5 mg by mouth daily    Historical Provider, MD   Red Yeast Rice 600 MG CAPS Take 1 capsule by mouth daily    Historical Provider, MD   Omega-3 Fatty Acids (FISH OIL) 1000 MG CAPS Take 1,000 mg by mouth daily    Historical Provider, MD   Multiple Vitamins-Minerals (OCUVITE ADULT 50+ PO) Take 1 tablet by mouth daily    Historical Provider, MD   levothyroxine (SYNTHROID) 75 MCG tablet Take 75 mcg by mouth Daily. Historical Provider, MD   losartan (COZAAR) 50 MG tablet Take 50 mg by mouth daily. Historical Provider, MD   Vitamin D (CHOLECALCIFEROL) 1000 UNITS CAPS capsule Take 1,000 Units by mouth daily. Historical Provider, MD   acetaminophen (TYLENOL) 500 MG tablet Take 500 mg by mouth every 6 hours as needed.  2x daily    Historical Provider, MD       Current medications:    Current Facility-Administered Medications   Medication Dose Route Frequency Provider Last Rate Last Dose    sodium chloride flush 0.9 % injection 10 mL  10 mL Intravenous 2 times per day Thadeus Dapash, DO        sodium chloride flush 0.9 % injection 10 mL  10 mL Intravenous PRN Thadeus Dapash, DO        acetaminophen (TYLENOL) tablet 650 mg  650 mg Oral Q4H PRN Chad Furth, DO   650 mg at 07/22/20 0205    pantoprazole (PROTONIX) injection 40 mg  40 mg Intravenous Once Elian Ritter MD        And    sodium chloride (PF) 0.9 % injection 10 mL  10 mL Intravenous Once Elian Ritter MD        pantoprazole (PROTONIX) 80 mg in sodium chloride 0.9 % 100 mL infusion  8 mg/hr Intravenous Continuous Elian Ritter MD 10 mL/hr at 07/22/20 1221 8 mg/hr at 07/22/20 1221    acetaminophen (TYLENOL) tablet 650 mg  650 mg Oral Q6H PRN Marie Albrecht MD        gabapentin (NEURONTIN) capsule 100 mg  100 mg Oral BID- 8&2 Marie Albrecht MD        gabapentin (NEURONTIN) capsule 300 mg  300 mg Oral Nightly Mariajose Arora MD   300 mg at 07/21/20 2003    levothyroxine (SYNTHROID) tablet 75 mcg  75 mcg Oral Daily Marie Albrecht MD        losartan (COZAAR) tablet 50 mg  50 mg Oral Daily Mariajose Arora MD   50 mg at 07/21/20 2002    metoprolol tartrate (LOPRESSOR) tablet 50 mg  50 mg Oral Daily Marie Albrecht MD   50 mg at 07/21/20 2003    Vitamin D (CHOLECALCIFEROL) tablet 1,000 Units  1,000 Units Oral Daily Marie Albrecht MD   1,000 Units at 07/21/20 2003    traMADol (ULTRAM) tablet 50 mg  50 mg Oral Q12H PRN Marie Albrecht MD   50 mg at 07/21/20 1756       Allergies: Allergies   Allergen Reactions    Levofloxacin Nausea And Vomiting and Swelling     Swelling of mouth    Bactrim [Sulfamethoxazole-Trimethoprim] Other (See Comments)     Muscle cramps in upper thighs    Erythromycin Nausea And Vomiting    Amoxicillin Other (See Comments)     Thrush - patient and son say amoxicillin is ok. The thrush may have been from an inhaler. Ok with ancef    Celebrex [Celecoxib] Other (See Comments)     Swelling of ankles    Clinoril [Sulindac]      ?     Codeine Palpitations     Flushes, hot sensation    Morphine      nightmares       Problem List:    Patient Active Problem List   Diagnosis Code    Osteomyelitis of toe of right foot ([facility] [address] M86.9    Multiple rib fractures S22.49XA    RA [facility] M06.9    Closed fracture of nasal bone S02. 2XXA    Closed fracture of right hand S62. 91XA    Closed fracture of maxillary sinus ([facility] S02.401A    Anemia D64.9       Past Medical History:        Diagnosis Date    Arthritis     History of blood transfusion     Hyperlipidemia     Hypertension     Rheumatoid arthritis(714.0)     Thyroid disease        Past Surgical History:        Procedure Laterality Date    CARPAL TUNNEL RELEASE      right    CHOLECYSTECTOMY      ELBOW SURGERY Left 1998    INGUINAL HERNIA REPAIR Right     JOINT REPLACEMENT      Lt. Elbow,Lt. Knee    KNEE SURGERY Left 1990       Social History:    Social History     Tobacco Use    Smoking status: Never Smoker    Smokeless tobacco: Never Used   Substance Use Topics    Alcohol use: No                                Counseling given: Not Answered      Vital Signs (Current):   Vitals:    07/21/20 1830 07/22/20 0156 07/22/20 0215 07/22/20 0545   BP: (!) 121/43 (!) 110/58 (!) 112/52 (!) 98/56   Pulse: 112 84 82 80   Resp: 22 20 16 16   Temp: 98 °F (36.7 °C) 98 °F (36.7 °C) 98.2 °F (36.8 °C) 98 °F (36.7 °C)   TempSrc: Oral Oral Oral Oral   SpO2: 94% 91% 92% 92%   Weight:       Height:                                                  BP Readings from Last 3 Encounters:   07/22/20 (!) 98/56   06/27/19 (!) 178/81   04/27/19 (!) 170/76       NPO Status:                                                                                 BMI:   Wt Readings from Last 3 Encounters:   07/21/20 108 lb (49 kg)   06/27/19 103 lb (46.7 kg)   04/27/19 106 lb 8 oz (48.3 kg)     Body mass index is 21.81 kg/m².     CBC:   Lab Results   Component Value Date    WBC 15.1 07/21/2020    RBC 2.27 07/21/2020    HGB 8.5 07/22/2020    HCT 27.9 07/22/2020    MCV 98.7 07/21/2020    RDW 13.3 07/21/2020     07/21/2020       CMP:   Lab Results   Component Value Date     07/22/2020    K 3.9 07/22/2020    K 4.1 07/21/2020     07/22/2020    CO2 22 07/22/2020    BUN 56 07/22/2020    CREATININE 0.9 07/22/2020    GFRAA >60 07/22/2020    LABGLOM 59 07/22/2020    GLUCOSE 94 07/22/2020    GLUCOSE 101 03/28/2011    PROT 4.9 07/22/2020    CALCIUM 8.3 07/22/2020    BILITOT 1.3 07/22/2020    ALKPHOS 40 07/22/2020    AST 24 07/22/2020    ALT 11 07/22/2020       POC Tests: No results for input(s): POCGLU, POCNA, POCK, POCCL, POCBUN, POCHEMO, POCHCT in the last 72 hours. Coags:   Lab Results   Component Value Date    PROTIME 12.0 07/21/2020    INR 1.0 07/21/2020       HCG (If Applicable): No results found for: PREGTESTUR, PREGSERUM, HCG, HCGQUANT     ABGs: No results found for: PHART, PO2ART, EXL4NNN, LXR2EQC, BEART, S6DHITDV     Type & Screen (If Applicable):  No results found for: LABABO, LABRH    Drug/Infectious Status (If Applicable):  No results found for: HIV, HEPCAB    COVID-19 Screening (If Applicable): No results found for: COVID19      Anesthesia Evaluation  Nursing notes reviewed no history of anesthetic complications:   Airway: Mallampati: II  TM distance: >3 FB   Neck ROM: full  Mouth opening: > = 3 FB Dental:    (+) upper dentures      Pulmonary:Negative Pulmonary ROS breath sounds clear to auscultation                             Cardiovascular:    (+) hypertension:, hyperlipidemia        Rhythm: regular  Rate: normal           Beta Blocker:  Dose within 24 Hrs         Neuro/Psych:   Negative Neuro/Psych ROS              GI/Hepatic/Renal:        (-) bowel prep and no morbid obesity       Endo/Other:    (+) hypothyroidism, blood dyscrasia: anemia and interval change, arthritis: rheumatoid. , .                  ROS comment: osteomyelitis right foot Abdominal:         (-) obese     Vascular: negative vascular ROS.                                     Anesthesia Plan      MAC     ASA 3       Induction: intravenous. Anesthetic plan and risks discussed with patient. Plan discussed with CRNA. Corazon Bashir MD   7/21/2020    DOS STAFF ADDENDUM:    Pt seen and examined, chart reviewed (including anesthesia, drug and allergy history). Anesthetic plan, risks, benefits, alternatives, and personnel involved discussed with patient. Patient verbalized an understanding and agrees to proceed. Plan discussed with care team members and agreed upon.     Danielle Everett MD  Staff Anesthesiologist  2:30 PM

## 2020-07-21 NOTE — ED NOTES
Bed: 03  Expected date:   Expected time:   Means of arrival:   Comments:  Kristina Gilbert RN  07/21/20 2882

## 2020-07-22 ENCOUNTER — ANESTHESIA (OUTPATIENT)
Dept: ENDOSCOPY | Age: 85
DRG: 378 | End: 2020-07-22
Payer: MEDICARE

## 2020-07-22 ENCOUNTER — APPOINTMENT (OUTPATIENT)
Dept: ULTRASOUND IMAGING | Age: 85
DRG: 378 | End: 2020-07-22
Payer: MEDICARE

## 2020-07-22 VITALS — SYSTOLIC BLOOD PRESSURE: 81 MMHG | DIASTOLIC BLOOD PRESSURE: 53 MMHG | OXYGEN SATURATION: 90 %

## 2020-07-22 LAB
ALBUMIN SERPL-MCNC: 3 G/DL (ref 3.5–5.2)
ALP BLD-CCNC: 40 U/L (ref 35–104)
ALT SERPL-CCNC: 11 U/L (ref 0–32)
ANION GAP SERPL CALCULATED.3IONS-SCNC: 10 MMOL/L (ref 7–16)
AST SERPL-CCNC: 24 U/L (ref 0–31)
BILIRUB SERPL-MCNC: 1.3 MG/DL (ref 0–1.2)
BLOOD BANK DISPENSE STATUS: NORMAL
BLOOD BANK DISPENSE STATUS: NORMAL
BLOOD BANK PRODUCT CODE: NORMAL
BLOOD BANK PRODUCT CODE: NORMAL
BPU ID: NORMAL
BPU ID: NORMAL
BUN BLDV-MCNC: 56 MG/DL (ref 8–23)
CALCIUM SERPL-MCNC: 8.3 MG/DL (ref 8.6–10.2)
CHLORIDE BLD-SCNC: 103 MMOL/L (ref 98–107)
CO2: 22 MMOL/L (ref 22–29)
CREAT SERPL-MCNC: 0.9 MG/DL (ref 0.5–1)
DESCRIPTION BLOOD BANK: NORMAL
DESCRIPTION BLOOD BANK: NORMAL
GFR AFRICAN AMERICAN: >60
GFR NON-AFRICAN AMERICAN: 59 ML/MIN/1.73
GLUCOSE BLD-MCNC: 94 MG/DL (ref 74–99)
HAPTOGLOBIN: 129 MG/DL (ref 30–200)
HCT VFR BLD CALC: 21.4 % (ref 34–48)
HCT VFR BLD CALC: 24.8 % (ref 34–48)
HCT VFR BLD CALC: 25.5 % (ref 34–48)
HCT VFR BLD CALC: 27.9 % (ref 34–48)
HEMOGLOBIN: 6.9 G/DL (ref 11.5–15.5)
HEMOGLOBIN: 8.1 G/DL (ref 11.5–15.5)
HEMOGLOBIN: 8.4 G/DL (ref 11.5–15.5)
HEMOGLOBIN: 8.5 G/DL (ref 11.5–15.5)
LACTATE DEHYDROGENASE: 192 U/L (ref 135–214)
POTASSIUM SERPL-SCNC: 3.9 MMOL/L (ref 3.5–5)
REASON FOR REJECTION: NORMAL
REJECTED TEST: NORMAL
SODIUM BLD-SCNC: 135 MMOL/L (ref 132–146)
TOTAL PROTEIN: 4.9 G/DL (ref 6.4–8.3)

## 2020-07-22 PROCEDURE — 6370000000 HC RX 637 (ALT 250 FOR IP): Performed by: SURGERY

## 2020-07-22 PROCEDURE — 30233N1 TRANSFUSION OF NONAUTOLOGOUS RED BLOOD CELLS INTO PERIPHERAL VEIN, PERCUTANEOUS APPROACH: ICD-10-PCS | Performed by: INTERNAL MEDICINE

## 2020-07-22 PROCEDURE — 83615 LACTATE (LD) (LDH) ENZYME: CPT

## 2020-07-22 PROCEDURE — 80053 COMPREHEN METABOLIC PANEL: CPT

## 2020-07-22 PROCEDURE — 7100000010 HC PHASE II RECOVERY - FIRST 15 MIN: Performed by: INTERNAL MEDICINE

## 2020-07-22 PROCEDURE — 6360000002 HC RX W HCPCS: Performed by: NURSE ANESTHETIST, CERTIFIED REGISTERED

## 2020-07-22 PROCEDURE — P9016 RBC LEUKOCYTES REDUCED: HCPCS

## 2020-07-22 PROCEDURE — 83010 ASSAY OF HAPTOGLOBIN QUANT: CPT

## 2020-07-22 PROCEDURE — 2580000003 HC RX 258: Performed by: HOSPITALIST

## 2020-07-22 PROCEDURE — 2580000003 HC RX 258: Performed by: SURGERY

## 2020-07-22 PROCEDURE — 3700000001 HC ADD 15 MINUTES (ANESTHESIA): Performed by: INTERNAL MEDICINE

## 2020-07-22 PROCEDURE — 0DD78ZX EXTRACTION OF STOMACH, PYLORUS, VIA NATURAL OR ARTIFICIAL OPENING ENDOSCOPIC, DIAGNOSTIC: ICD-10-PCS | Performed by: HOSPITALIST

## 2020-07-22 PROCEDURE — 7100000011 HC PHASE II RECOVERY - ADDTL 15 MIN: Performed by: INTERNAL MEDICINE

## 2020-07-22 PROCEDURE — 6360000002 HC RX W HCPCS: Performed by: HOSPITALIST

## 2020-07-22 PROCEDURE — 6370000000 HC RX 637 (ALT 250 FOR IP): Performed by: STUDENT IN AN ORGANIZED HEALTH CARE EDUCATION/TRAINING PROGRAM

## 2020-07-22 PROCEDURE — 36415 COLL VENOUS BLD VENIPUNCTURE: CPT

## 2020-07-22 PROCEDURE — 3609012400 HC EGD TRANSORAL BIOPSY SINGLE/MULTIPLE: Performed by: INTERNAL MEDICINE

## 2020-07-22 PROCEDURE — 1200000000 HC SEMI PRIVATE

## 2020-07-22 PROCEDURE — 93971 EXTREMITY STUDY: CPT

## 2020-07-22 PROCEDURE — 88305 TISSUE EXAM BY PATHOLOGIST: CPT

## 2020-07-22 PROCEDURE — 85018 HEMOGLOBIN: CPT

## 2020-07-22 PROCEDURE — 2709999900 HC NON-CHARGEABLE SUPPLY: Performed by: INTERNAL MEDICINE

## 2020-07-22 PROCEDURE — C9113 INJ PANTOPRAZOLE SODIUM, VIA: HCPCS | Performed by: HOSPITALIST

## 2020-07-22 PROCEDURE — 3700000000 HC ANESTHESIA ATTENDED CARE: Performed by: INTERNAL MEDICINE

## 2020-07-22 PROCEDURE — 2580000003 HC RX 258: Performed by: INTERNAL MEDICINE

## 2020-07-22 PROCEDURE — 2580000003 HC RX 258: Performed by: NURSE ANESTHETIST, CERTIFIED REGISTERED

## 2020-07-22 PROCEDURE — 88342 IMHCHEM/IMCYTCHM 1ST ANTB: CPT

## 2020-07-22 PROCEDURE — 36430 TRANSFUSION BLD/BLD COMPNT: CPT

## 2020-07-22 PROCEDURE — 6360000002 HC RX W HCPCS: Performed by: INTERNAL MEDICINE

## 2020-07-22 PROCEDURE — 85014 HEMATOCRIT: CPT

## 2020-07-22 RX ORDER — PROPOFOL 10 MG/ML
INJECTION, EMULSION INTRAVENOUS PRN
Status: DISCONTINUED | OUTPATIENT
Start: 2020-07-22 | End: 2020-07-22 | Stop reason: SDUPTHER

## 2020-07-22 RX ORDER — FUROSEMIDE 10 MG/ML
40 INJECTION INTRAMUSCULAR; INTRAVENOUS ONCE
Status: DISCONTINUED | OUTPATIENT
Start: 2020-07-22 | End: 2020-07-22

## 2020-07-22 RX ORDER — SODIUM CHLORIDE 0.9 % (FLUSH) 0.9 %
10 SYRINGE (ML) INJECTION EVERY 12 HOURS SCHEDULED
Status: DISCONTINUED | OUTPATIENT
Start: 2020-07-22 | End: 2020-07-26 | Stop reason: HOSPADM

## 2020-07-22 RX ORDER — FUROSEMIDE 10 MG/ML
10 INJECTION INTRAMUSCULAR; INTRAVENOUS ONCE
Status: COMPLETED | OUTPATIENT
Start: 2020-07-22 | End: 2020-07-22

## 2020-07-22 RX ORDER — PANTOPRAZOLE SODIUM 40 MG/1
40 TABLET, DELAYED RELEASE ORAL
Status: DISCONTINUED | OUTPATIENT
Start: 2020-07-23 | End: 2020-07-26 | Stop reason: HOSPADM

## 2020-07-22 RX ORDER — SODIUM CHLORIDE 9 MG/ML
INJECTION, SOLUTION INTRAVENOUS CONTINUOUS PRN
Status: DISCONTINUED | OUTPATIENT
Start: 2020-07-22 | End: 2020-07-22 | Stop reason: SDUPTHER

## 2020-07-22 RX ORDER — 0.9 % SODIUM CHLORIDE 0.9 %
20 INTRAVENOUS SOLUTION INTRAVENOUS ONCE
Status: COMPLETED | OUTPATIENT
Start: 2020-07-22 | End: 2020-07-22

## 2020-07-22 RX ORDER — SODIUM CHLORIDE 0.9 % (FLUSH) 0.9 %
10 SYRINGE (ML) INJECTION PRN
Status: DISCONTINUED | OUTPATIENT
Start: 2020-07-22 | End: 2020-07-26 | Stop reason: HOSPADM

## 2020-07-22 RX ADMIN — ACETAMINOPHEN 650 MG: 325 TABLET, FILM COATED ORAL at 02:05

## 2020-07-22 RX ADMIN — ACETAMINOPHEN 650 MG: 325 TABLET, FILM COATED ORAL at 21:31

## 2020-07-22 RX ADMIN — SODIUM CHLORIDE 8 MG/HR: 9 INJECTION, SOLUTION INTRAVENOUS at 12:21

## 2020-07-22 RX ADMIN — PROPOFOL 100 MG: 10 INJECTION, EMULSION INTRAVENOUS at 15:41

## 2020-07-22 RX ADMIN — TRAMADOL HYDROCHLORIDE 50 MG: 50 TABLET, FILM COATED ORAL at 17:53

## 2020-07-22 RX ADMIN — SODIUM CHLORIDE: 9 INJECTION, SOLUTION INTRAVENOUS at 15:23

## 2020-07-22 RX ADMIN — SODIUM CHLORIDE 20 ML: 0.9 INJECTION, SOLUTION INTRAVENOUS at 06:10

## 2020-07-22 RX ADMIN — Medication 10 ML: at 21:30

## 2020-07-22 RX ADMIN — GABAPENTIN 300 MG: 300 CAPSULE ORAL at 21:30

## 2020-07-22 RX ADMIN — FUROSEMIDE 10 MG: 10 INJECTION, SOLUTION INTRAMUSCULAR; INTRAVENOUS at 06:29

## 2020-07-22 ASSESSMENT — PAIN DESCRIPTION - LOCATION
LOCATION: GENERALIZED
LOCATION: GENERALIZED

## 2020-07-22 ASSESSMENT — PAIN SCALES - GENERAL
PAINLEVEL_OUTOF10: 0
PAINLEVEL_OUTOF10: 0
PAINLEVEL_OUTOF10: 6
PAINLEVEL_OUTOF10: 7
PAINLEVEL_OUTOF10: 9
PAINLEVEL_OUTOF10: 0
PAINLEVEL_OUTOF10: 4
PAINLEVEL_OUTOF10: 6

## 2020-07-22 ASSESSMENT — PAIN - FUNCTIONAL ASSESSMENT
PAIN_FUNCTIONAL_ASSESSMENT: PREVENTS OR INTERFERES WITH MANY ACTIVE NOT PASSIVE ACTIVITIES
PAIN_FUNCTIONAL_ASSESSMENT: PREVENTS OR INTERFERES SOME ACTIVE ACTIVITIES AND ADLS

## 2020-07-22 ASSESSMENT — PAIN DESCRIPTION - ONSET: ONSET: GRADUAL

## 2020-07-22 ASSESSMENT — PAIN DESCRIPTION - ORIENTATION: ORIENTATION: INNER

## 2020-07-22 ASSESSMENT — PAIN DESCRIPTION - DESCRIPTORS
DESCRIPTORS: ACHING;DISCOMFORT;NAGGING
DESCRIPTORS: ACHING;DISCOMFORT;SORE

## 2020-07-22 ASSESSMENT — PAIN DESCRIPTION - PAIN TYPE
TYPE: CHRONIC PAIN;ACUTE PAIN
TYPE: ACUTE PAIN;CHRONIC PAIN

## 2020-07-22 ASSESSMENT — PAIN DESCRIPTION - PROGRESSION: CLINICAL_PROGRESSION: GRADUALLY IMPROVING

## 2020-07-22 ASSESSMENT — PAIN DESCRIPTION - FREQUENCY: FREQUENCY: INTERMITTENT

## 2020-07-22 NOTE — PLAN OF CARE
Problem: Falls - Risk of:  Goal: Will remain free from falls  Outcome: Met This Shift  Goal: Absence of physical injury  Outcome: Met This Shift     Problem: Skin Integrity:  Goal: Will show no infection signs and symptoms  Outcome: Met This Shift  Goal: Absence of new skin breakdown  Outcome: Met This Shift     Problem: Pain:  Goal: Pain level will decrease  Outcome: Met This Shift  Goal: Control of acute pain  Outcome: Met This Shift  Goal: Control of chronic pain  Outcome: Met This Shift

## 2020-07-22 NOTE — PLAN OF CARE
Problem: Falls - Risk of:  Goal: Will remain free from falls  Description: Will remain free from falls  7/21/2020 2332 by Goldy Solis RN  Outcome: Met This Shift  7/21/2020 2332 by Goldy Solis RN  Outcome: Met This Shift  Goal: Absence of physical injury  Description: Absence of physical injury  7/21/2020 2332 by Goldy Solis RN  Outcome: Met This Shift  7/21/2020 2332 by Goldy Solis RN  Outcome: Met This Shift     Problem: Skin Integrity:  Goal: Will show no infection signs and symptoms  Description: Will show no infection signs and symptoms  7/21/2020 2332 by Goldy Solis RN  Outcome: Met This Shift  7/21/2020 2332 by Goldy Solis RN  Outcome: Met This Shift     Problem: Pain:  Goal: Pain level will decrease  Description: Pain level will decrease  Outcome: Met This Shift  Goal: Control of acute pain  Description: Control of acute pain  Outcome: Met This Shift

## 2020-07-22 NOTE — PROCEDURES
Procedure:    Esophagogastroduodenoscopy    Indication:    Anemia and melena     Estimated Blood Loss:  0    Consent:   Informed consent was obtained from the patient including and not limited to risk of perforation, aspiration of gastric contents or teeth, bleeding, infection, dental breakage, ileus, need for surgery, or worst case death. Sedation  Endoscope was advanced easily through mouth to second portion of duodenum    Oropharynx:    views are limited but grossly normal.    Esophagus:   Mucosa is normal.  GEJ at ~30 cm. ~ 8 cm hiatal hernia from ~ 30-38 cm. No Yusuf ulcers or lesions     Stomach:   Clean based 5mm antral ulcer, biopsies of ulcer obtained to r/o dysplasia    Mild antral gastritis, biopsies of antrum obtained to r/o H. Pylori     Gastric body is normal.    Retroflexed views showed normal fundus and cardia. Duodenum: Bulb is normal.     Second portion of duodenum is normal.      No fresh of old blood. IMPRESSION AND PLAN:     1. ~ 8 cm hiatal hernia from ~ 30-38 cm. No Yusuf ulcers or lesions     2. Clean based 5mm antral ulcer, biopsies of ulcer obtained to r/o dysplasia     3. Mild antral gastritis, biopsies of antrum obtained to r/o H. Pylori     No fresh or old blood. Likely source of anemia and melena secondary to the above endoscopic findings. Okay to D/C PPI gtt and transition to daily PPI. Continue serial H/H. If patient has a precipitous drop in hemoglobin will recommend NM bleeding scan and inpatient colonoscopy. Okay for general diet       Follow up as outpatient in office, call [phone] to schedule for appointment.       Yvrose Cobb DO  7/22/2020  3:40 PM

## 2020-07-22 NOTE — H&P
Di Perez is an 80 y.o. white female. with a past medical history of rheumatoid arthritis, hypertension, hyperlipidemia who was admitted through the emergency room with multiple falls, weakness, and blood in her stools. Patient states she has noticed dark tarry stools intermittently for the past week, she has been weak and fatigued during this time. Patient states she had a ground-level fall 2 days ago. She states she was walking with her walker, felt very weak, and fell forward landing on her walker and then hitting the ground. Patient presents with multiple bruises noted to her forehead, face, chest, and lower extremity. Patient denies joint pain at this time, she states she has been up and walking with her walker since her fall with no other difficulties. Patient states she is not on blood thinners. She denies fevers, chills, chest pain, shortness of breath, nausea, vomiting, diarrhea. Patient had blood transfusion through the right arm IV infiltrated hematoma present at the left elbow mid upper arm    Past Medical History:   Diagnosis Date    Arthritis     History of blood transfusion     Hyperlipidemia     Hypertension     Rheumatoid arthritis(714.0)     Thyroid disease            Procedure Laterality Date    CARPAL TUNNEL RELEASE      right    CHOLECYSTECTOMY      ELBOW SURGERY Left 1998    INGUINAL HERNIA REPAIR Right     JOINT REPLACEMENT      Lt. Elbow,Lt. Knee    KNEE SURGERY Left 1990       Social personal history patient lives at home no cigarette smoking no alcohol abuse    Allergies: Allergies   Allergen Reactions    Levofloxacin Nausea And Vomiting and Swelling     Swelling of mouth    Bactrim [Sulfamethoxazole-Trimethoprim] Other (See Comments)     Muscle cramps in upper thighs    Erythromycin Nausea And Vomiting    Amoxicillin Other (See Comments)     Thrush - patient and son say amoxicillin is ok. The thrush may have been from an inhaler.   Ok with ancef    Celebrex [Celecoxib] Other (See Comments)     Swelling of ankles    Clinoril [Sulindac]      ?  Codeine Palpitations     Flushes, hot sensation    Morphine      nightmares         Blood pressure (!) 98/56, pulse 80, temperature 98 °F (36.7 °C), temperature source Oral, resp. rate 16, height 4' 11\" (1.499 m), weight 108 lb (49 kg), SpO2 92 %, not currently breastfeeding. Review of systems negative for cardiopulmonary symptoms urinary symptoms patient has black tarry stool. Physical Exam   Patient is alert pleasant oriented to person and place. Seems pale  HEENT unremarkable except for pallor  Neck supple no JVDs no carotid bruit   lungs clear to auscultation and percussion  Heart regular rate and rhythm no murmur gallop or rub PMI present at the fifth intercostal space midclavicular line  Abdomen soft nontender bowel sounds present no organomegaly no ascites  Extremities symmetrical with noticeable ulnar deviation on both hand fingers with MCP deformities  Right arm large hematoma involving upper forearm elbow and mid upper arm warm to touch nontender.   Lower extremities no calf tenderness Homans sign negative bilaterally pedal pulses present bilaterally  Rectal and pelvic exam deferred  Skin cold pale mucous membrane pale  Neuro exam unremarkable gait was not tested  Lymph node exam unremarkable      CBC:   Lab Results   Component Value Date    WBC 15.1 07/21/2020    RBC 2.27 07/21/2020    HGB 8.4 07/22/2020    HCT 25.5 07/22/2020    MCV 98.7 07/21/2020    MCH 32.2 07/21/2020    MCHC 32.6 07/21/2020    RDW 13.3 07/21/2020     07/21/2020    MPV 9.1 07/21/2020     CMP:    Lab Results   Component Value Date     07/22/2020    K 3.9 07/22/2020    K 4.1 07/21/2020     07/22/2020    CO2 22 07/22/2020    BUN 56 07/22/2020    CREATININE 0.9 07/22/2020    GFRAA >60 07/22/2020    LABGLOM 59 07/22/2020    GLUCOSE 94 07/22/2020    GLUCOSE 101 03/28/2011    PROT 4.9 07/22/2020    LABALBU 3.0 07/22/2020 LABALBU 3.9 03/28/2011    CALCIUM 8.3 07/22/2020    BILITOT 1.3 07/22/2020    ALKPHOS 40 07/22/2020    AST 24 07/22/2020    ALT 11 07/22/2020         Assessment:  Gastrointestinal hemorrhage  Elderly falls  Advanced rheumatoid arthritis  Hyperlipidemia  Hypotension and anemia due to GI blood loss  Right arm hematoma    Active Problems:    Anemia  Resolved Problems:    * No resolved hospital problems. *        Plan:  Blood transfusion  PPI  GI consult for EGD  Ultrasound of the right arm    Active Orders   Lab    Haptoglobin     Frequency: 1 Time     Number of Occurrences: 1 Occurrences    Hemoglobin and hematocrit, blood     Frequency: 1 Time     Number of Occurrences: 1 Occurrences     Order Comments: 2 hours post transfusion        Hemoglobin and hematocrit, blood     Frequency: 1 Time     Number of Occurrences: 1 Occurrences    Hemoglobin and hematocrit, blood     Frequency: Q8H     Number of Occurrences: 7 Days    Hemoglobin and Hematocrit, Blood, Post Transfusion     Frequency: Post Transfusion     Number of Occurrences: 1 Occurrences     Order Comments: Draw between 15 and 60 minutes post transfusion. Hemoglobin and Hematocrit, Blood, Post Transfusion     Frequency: Post Transfusion     Number of Occurrences: 1 Occurrences     Order Comments: Draw between 15 and 60 minutes post transfusion.         Lactate dehydrogenase     Frequency: 1 Time     Number of Occurrences: 1 Occurrences   Diet    Diet NPO Effective Now     Frequency: Effective Now     Number of Occurrences: Until Specified   Nursing    Anesthesia Type Request     Frequency: 1 Time     Number of Occurrences: 1 Occurrences    Bedrest     Frequency: Until Discontinued     Number of Occurrences: Until Specified    Elevate heels off of bed at all times if patient is not able to move lower extremities     Frequency: Until Discontinued     Number of Occurrences: Until Specified    Elevate hob     Frequency: Until Discontinued     Number of the vein   - Any time warranted during transfusion  - At the end of the transfusion  - 1 hour after transfusion ended      Vital signs per unit routine     Frequency: Until Discontinued     Number of Occurrences: Until Specified   Code Status    Full Code     Frequency: Continuous     Number of Occurrences: Until Specified   Consult    Inpatient consult to Primary Care Provider     Frequency: 1 Time     Number of Occurrences: 1 Occurrences   Blood Bank    PREPARE RBC (CROSSMATCH), 1 Units     Frequency: Once     Number of Occurrences: 1 Occurrences   Nursing Transfusion    Transfuse RBC     Frequency: Transfusion     Number of Occurrences: 1 Occurrences   Medications    acetaminophen (TYLENOL) tablet 650 mg     Frequency: Q4H PRN     Dose: 650 mg     Route: Oral    acetaminophen (TYLENOL) tablet 650 mg     Frequency: Q6H PRN     Dose: 650 mg     Route: Oral    gabapentin (NEURONTIN) capsule 100 mg     Frequency: BID- 8&2     Dose: 100 mg     Route: Oral    gabapentin (NEURONTIN) capsule 300 mg     Frequency: Nightly     Dose: 300 mg     Route: Oral    levothyroxine (SYNTHROID) tablet 75 mcg     Frequency: Daily     Dose: 75 mcg     Route: Oral    losartan (COZAAR) tablet 50 mg     Frequency: Daily     Dose: 50 mg     Route: Oral    metoprolol tartrate (LOPRESSOR) tablet 50 mg     Frequency: Daily     Dose: 50 mg     Route: Oral    pantoprazole (PROTONIX) 80 mg in sodium chloride 0.9 % 100 mL infusion     Frequency: Continuous     Dose: 8 mg/hr     Route: Intravenous    pantoprazole (PROTONIX) injection 40 mg     Linked Order: And     Frequency: Once     Dose: 40 mg     Route: Intravenous    sodium chloride (PF) 0.9 % injection 10 mL     Linked Order: And     Frequency: Once     Dose: 10 mL     Route: Intravenous    sodium chloride flush 0.9 % injection 10 mL     Frequency: Q12H [address]     Dose: 10 mL     Route: Intravenous    sodium chloride flush 0.9 % injection 10 mL     Frequency: PRN     Dose: 10 mL     Route: Intravenous    traMADol (ULTRAM) tablet 50 mg     Frequency: Q12H PRN     Dose: 50 mg     Route: Oral    Vitamin D (CHOLECALCIFEROL) tablet 1,000 Units     Frequency: Daily     Dose: 1,000 Units     Route: Oral        Di Arora  7/22/2020

## 2020-07-22 NOTE — CARE COORDINATION
SS NOTE: SW met with pt today. Pt relates that she resides in a 1 floor plan home with no steps. Pt was I pta with adls and her dtr- Kasia Foote assists with some IADL and son Henny Sanchez who resides next door assists with lawn and garden. Pt relates that she has a hired caregiver from [employer] 1x a week for personal care and bathing. Pt also has a hired  3 x per week for cooking and cleaning/ laundry. Pt relies on her family for transport. Pt relates that she has a ww at home that she does use. She has no hx of HHC or SNF/ rehab. Pt's PCP is Dr Carl Connell and her pharmacy is [facility] in [place]. SW did ask permission to contact a family member today to advise them of the SW involvement however pt did not want this SW to do that at this time. Will await PT/OT recommendations for further dch planning. Kate Magallon. 7/22/2020.11:08 AM.

## 2020-07-22 NOTE — PROGRESS NOTES
I came onto my shift at 7 pm and patient's IV had infiltrated while receiving blood prior to me coming on in right upper arm patient has a hematoma on right inner upper arm and is 13 cm long and 10 cm wide.  Ice applied and arm elevated

## 2020-07-22 NOTE — CONSULTS
GI CONSULT NOTE    [facility]  Dr. Yelena West M.D., Dr. Anusha Huertas M.D., Dr. Renate Lopez D.O., Dr. Aj Tejada M.D., Dr. Wilfredo Sauer D.O. Fatmata Kraft D.O., GI fellow      Date:10:21 AM 7/22/2020    E[place]  80 y.o.  female    HPI:    81 y/o F with PMHx of RA, HTN, and hyperlipidemia presented to the ED with worsening weakness, fatigue, and melena for one week. Due to the weakness she did sustain a ground level fall 2 days prior and denies any LOC. H/H in ED was 7.3/22.4 and from her past lab draws she is usually in the 11/33 to 13/39 range. Patient denies any previous history of similar symptoms. Denies any recent medication or dietary changes. Denies taking any anticoagulation or any antiinflammatories. Denies any abdominal pain, nausea, vomiting, diarrhea, constipation, or hematochezia. Last colonoscopy was over 10 years ago and she cannot remember the findings or details of the exam. Denies any previous history of an EGD. Last colonoscopy  > 10 years ago  Last EGD denies previous    PAST MEDICAL Hx:  Past Medical History:   Diagnosis Date    Arthritis     History of blood transfusion     Hyperlipidemia     Hypertension     Rheumatoid arthritis(714.0)     Thyroid disease        PAST SURGICAL Hx:   Past Surgical History:   Procedure Laterality Date    CARPAL TUNNEL RELEASE      right    CHOLECYSTECTOMY      ELBOW SURGERY Left 1998    INGUINAL HERNIA REPAIR Right     JOINT REPLACEMENT      Lt. Elbow,Lt. Knee    KNEE SURGERY Left 1990       FAMILY Hx:  Family History   Problem Relation Age of Onset    Heart Disease Brother        HOME MEDICATIONS:  Prior to Admission medications    Medication Sig Start Date End Date Taking? Authorizing Provider   gabapentin (NEURONTIN) 100 MG capsule Take 100 mg by mouth 2 times daily at 0800 and 1400. Yes Historical Provider, MD   gabapentin (NEURONTIN) 300 MG capsule Take 300 mg by mouth nightly.    Yes Historical Provider, MD   polyethylene glycol (GLYCOLAX) powder Take 17 g by mouth daily    Historical Provider, MD   metoprolol (LOPRESSOR) 100 MG tablet Take 100 mg by mouth    Historical Provider, MD   hydroxychloroquine (PLAQUENIL) 200 MG tablet Take 200 mg by mouth daily    Historical Provider, MD   predniSONE (DELTASONE) 2.5 MG tablet Take 2.5 mg by mouth daily    Historical Provider, MD   Red Yeast Rice 600 MG CAPS Take 1 capsule by mouth daily    Historical Provider, MD   Omega-3 Fatty Acids (FISH OIL) 1000 MG CAPS Take 1,000 mg by mouth daily    Historical Provider, MD   Multiple Vitamins-Minerals (OCUVITE ADULT 50+ PO) Take 1 tablet by mouth daily    Historical Provider, MD   levothyroxine (SYNTHROID) 75 MCG tablet Take 75 mcg by mouth Daily. Historical Provider, MD   losartan (COZAAR) 50 MG tablet Take 50 mg by mouth daily. Historical Provider, MD   Vitamin D (CHOLECALCIFEROL) 1000 UNITS CAPS capsule Take 1,000 Units by mouth daily. Historical Provider, MD   acetaminophen (TYLENOL) 500 MG tablet Take 500 mg by mouth every 6 hours as needed. 2x daily    Historical Provider, MD       ALLERGIES:  Levofloxacin; Bactrim [sulfamethoxazole-trimethoprim]; Erythromycin; Amoxicillin; Celebrex [celecoxib]; Clinoril [sulindac]; Codeine; and Morphine    SOCIAL Hx:  Social History     Socioeconomic History    Marital status:       Spouse name: Not on file    Number of children: Not on file    Years of education: Not on file    Highest education level: Not on file   Occupational History    Not on file   Social Needs    Financial resource strain: Not on file    Food insecurity     Worry: Not on file     Inability: Not on file    Transportation needs     Medical: Not on file     Non-medical: Not on file   Tobacco Use    Smoking status: Never Smoker    Smokeless tobacco: Never Used   Substance and Sexual Activity    Alcohol use: No    Drug use: No    Sexual activity: Not on file   Lifestyle    Physical activity     Days per week: Not on file     Minutes per session: Not on file    Stress: Not on file   Relationships    Social connections     Talks on phone: Not on file     Gets together: Not on file     Attends Anabaptism service: Not on file     Active member of club or organization: Not on file     Attends meetings of clubs or organizations: Not on file     Relationship status: Not on file    Intimate partner violence     Fear of current or ex partner: Not on file     Emotionally abused: Not on file     Physically abused: Not on file     Forced sexual activity: Not on file   Other Topics Concern    Not on file   Social History Narrative    Not on file       PE:  BP (!) 98/56   Pulse 80   Temp 98 °F (36.7 °C) (Oral)   Resp 16   Ht 4' 11\" (1.499 m)   Wt 108 lb (49 kg)   SpO2 92%   BMI 21.81 kg/m²     General: A&Ox 3, friendly, NAD  HEENT: Atraumatic, symmetric, no anterior/posterior lymphadenopathy, moist mucous membranes, PERRL, EOM intact, fair dentition. Pulm: CTAB, Neg w/r/r, normal chest expansion, no crackles noted  Cardio: RRR, neg m/r/g, nl S1 and S2, no extra heart sounds  Abd.: soft, NT, ND, BS+, no G/R, no HSM  Ext: +2/4 pulse Dp and radial b/l, LE and UE ROM intact,   Skin: No lesions, excoriations, petechiae, or ecchymoses noted    Neuro: normal sensation throughout, DTRs patellar, tricept, and bicept 2/4 b/l and equal, normal muscle strength throughout.       DATA:     Lab Results   Component Value Date    WBC 15.1 07/21/2020    RBC 2.27 07/21/2020    HGB 8.4 07/22/2020    HCT 25.5 07/22/2020    MCV 98.7 07/21/2020    MCH 32.2 07/21/2020    MCHC 32.6 07/21/2020    RDW 13.3 07/21/2020     07/21/2020    MPV 9.1 07/21/2020     Lab Results   Component Value Date     07/22/2020    K 3.9 07/22/2020    K 4.1 07/21/2020     07/22/2020    CO2 22 07/22/2020    BUN 56 07/22/2020    CREATININE 0.9 07/22/2020    CALCIUM 8.3 07/22/2020    PROT 4.9 07/22/2020    LABALBU 3.0 07/22/2020    LABALBU 3.9 03/28/2011    BILITOT 1.3 07/22/2020    ALKPHOS 40 07/22/2020    AST 24 07/22/2020    ALT 11 07/22/2020     Lab Results   Component Value Date    LIPASE 34 06/27/2019     No results found for: AMYLASE      ASSESSMENT/PLAN:  1. Symptomatic blood loss anemia secondary to a GI bleed  -Normocytic normochromic anemia   -Hemodynamically stable without clinical signs of an overt or brisk GI bleed  -Likely secondary to upper GI source but cannot exclude lower   -PPI gtt  -Q6H H/H  -Type and cross, please keep 2 units on hold at all times. Transfusion per primary, recommend restrictive transfusion parameters  -Anemia panel  -Elevate head of bed   -NPO for EGD today         2. Rheumatoid arthritis/hypertension/hyperlipidemia  -per primary     -Will discuss with attending     Micheline Strong DO  7/22/2020  10:21 AM      Pt seen and independently examined. Pertinent notes and lab work reviewed. D/w Dr. Arturo Diaz. Agree with physical exam and A&P. Discussed with patient - all questions answered - agreeable with the plan as delineated. Thank you for the opportunity to see this patient in consultation.     Darlyn Ramos MD  7/22/2020  12:35 PM

## 2020-07-23 ENCOUNTER — APPOINTMENT (OUTPATIENT)
Dept: GENERAL RADIOLOGY | Age: 85
DRG: 378 | End: 2020-07-23
Payer: MEDICARE

## 2020-07-23 PROBLEM — I10 ESSENTIAL HYPERTENSION: Status: ACTIVE | Noted: 2020-07-23

## 2020-07-23 PROBLEM — K25.9 GASTRIC ULCER: Status: ACTIVE | Noted: 2020-07-23

## 2020-07-23 LAB
ANION GAP SERPL CALCULATED.3IONS-SCNC: 12 MMOL/L (ref 7–16)
BUN BLDV-MCNC: 40 MG/DL (ref 8–23)
CALCIUM SERPL-MCNC: 8.4 MG/DL (ref 8.6–10.2)
CHLORIDE BLD-SCNC: 103 MMOL/L (ref 98–107)
CO2: 19 MMOL/L (ref 22–29)
CREAT SERPL-MCNC: 1.1 MG/DL (ref 0.5–1)
GFR AFRICAN AMERICAN: 56
GFR NON-AFRICAN AMERICAN: 47 ML/MIN/1.73
GLUCOSE BLD-MCNC: 105 MG/DL (ref 74–99)
HCT VFR BLD CALC: 25.6 % (ref 34–48)
HCT VFR BLD CALC: 26.4 % (ref 34–48)
HEMOGLOBIN: 8.2 G/DL (ref 11.5–15.5)
HEMOGLOBIN: 8.3 G/DL (ref 11.5–15.5)
POTASSIUM SERPL-SCNC: 3.8 MMOL/L (ref 3.5–5)
SODIUM BLD-SCNC: 134 MMOL/L (ref 132–146)

## 2020-07-23 PROCEDURE — 9900000074 HC THERAPEUTIC ACTIVITIES PER 15 MIN (SELF-PAY): Performed by: PHYSICAL THERAPIST

## 2020-07-23 PROCEDURE — 70150 X-RAY EXAM OF FACIAL BONES: CPT

## 2020-07-23 PROCEDURE — 80048 BASIC METABOLIC PNL TOTAL CA: CPT

## 2020-07-23 PROCEDURE — 6370000000 HC RX 637 (ALT 250 FOR IP): Performed by: SURGERY

## 2020-07-23 PROCEDURE — 2580000003 HC RX 258: Performed by: SURGERY

## 2020-07-23 PROCEDURE — 1200000000 HC SEMI PRIVATE

## 2020-07-23 PROCEDURE — 97161 PT EVAL LOW COMPLEX 20 MIN: CPT | Performed by: PHYSICAL THERAPIST

## 2020-07-23 PROCEDURE — 85014 HEMATOCRIT: CPT

## 2020-07-23 PROCEDURE — 36415 COLL VENOUS BLD VENIPUNCTURE: CPT

## 2020-07-23 PROCEDURE — 85018 HEMOGLOBIN: CPT

## 2020-07-23 RX ADMIN — GABAPENTIN 100 MG: 100 CAPSULE ORAL at 14:28

## 2020-07-23 RX ADMIN — GABAPENTIN 300 MG: 300 CAPSULE ORAL at 21:10

## 2020-07-23 RX ADMIN — PANTOPRAZOLE SODIUM 40 MG: 40 TABLET, DELAYED RELEASE ORAL at 06:50

## 2020-07-23 RX ADMIN — TRAMADOL HYDROCHLORIDE 50 MG: 50 TABLET, FILM COATED ORAL at 09:39

## 2020-07-23 RX ADMIN — ACETAMINOPHEN 650 MG: 325 TABLET, FILM COATED ORAL at 21:10

## 2020-07-23 RX ADMIN — Medication 10 ML: at 09:40

## 2020-07-23 RX ADMIN — MELATONIN 1000 UNITS: at 09:41

## 2020-07-23 RX ADMIN — LEVOTHYROXINE SODIUM 75 MCG: 75 TABLET ORAL at 06:50

## 2020-07-23 RX ADMIN — METOPROLOL TARTRATE 50 MG: 50 TABLET, FILM COATED ORAL at 09:41

## 2020-07-23 RX ADMIN — Medication 10 ML: at 21:11

## 2020-07-23 RX ADMIN — GABAPENTIN 100 MG: 100 CAPSULE ORAL at 09:40

## 2020-07-23 RX ADMIN — ACETAMINOPHEN 650 MG: 325 TABLET, FILM COATED ORAL at 14:28

## 2020-07-23 ASSESSMENT — PAIN SCALES - GENERAL
PAINLEVEL_OUTOF10: 6
PAINLEVEL_OUTOF10: 5
PAINLEVEL_OUTOF10: 2
PAINLEVEL_OUTOF10: 2
PAINLEVEL_OUTOF10: 3
PAINLEVEL_OUTOF10: 2
PAINLEVEL_OUTOF10: 3

## 2020-07-23 ASSESSMENT — PAIN DESCRIPTION - ORIENTATION
ORIENTATION: INNER;UPPER;LOWER
ORIENTATION: INNER

## 2020-07-23 ASSESSMENT — PAIN - FUNCTIONAL ASSESSMENT
PAIN_FUNCTIONAL_ASSESSMENT: PREVENTS OR INTERFERES SOME ACTIVE ACTIVITIES AND ADLS
PAIN_FUNCTIONAL_ASSESSMENT: PREVENTS OR INTERFERES SOME ACTIVE ACTIVITIES AND ADLS

## 2020-07-23 ASSESSMENT — PAIN DESCRIPTION - PROGRESSION
CLINICAL_PROGRESSION: GRADUALLY IMPROVING
CLINICAL_PROGRESSION: GRADUALLY IMPROVING

## 2020-07-23 ASSESSMENT — PAIN DESCRIPTION - PAIN TYPE
TYPE: ACUTE PAIN;CHRONIC PAIN
TYPE: ACUTE PAIN;CHRONIC PAIN

## 2020-07-23 ASSESSMENT — PAIN DESCRIPTION - LOCATION
LOCATION: GENERALIZED
LOCATION: GENERALIZED

## 2020-07-23 ASSESSMENT — PAIN DESCRIPTION - ONSET
ONSET: GRADUAL
ONSET: GRADUAL

## 2020-07-23 ASSESSMENT — PAIN DESCRIPTION - DESCRIPTORS
DESCRIPTORS: ACHING;DISCOMFORT;SORE
DESCRIPTORS: ACHING;DISCOMFORT;SORE

## 2020-07-23 ASSESSMENT — PAIN DESCRIPTION - FREQUENCY
FREQUENCY: INTERMITTENT
FREQUENCY: INTERMITTENT

## 2020-07-23 NOTE — PROGRESS NOTES
Physical Therapy Initial Evaluation    Room #:  4194/8902-20  Patient Name: Raul Blakely  YOB: 1929  MRN: [id number]    Referring Provider: Dr. Daja Jacobs    Date of Service: 7/23/2020    Evaluating Physical Therapist:  Elizabeth Baum, PT, DPT   [id number]     Diagnosis:   Anemia [D64.9]      Patient Active Problem List   Diagnosis    Osteomyelitis of toe of right foot ([address]    Multiple rib fractures    RA [facility]oid [facility]    Closed fracture of nasal bone    Closed fracture of right hand    Closed fracture of maxillary sinus ([facility] [address]    Anemia    Essential hypertension    Gastric ulcer       Tentative placement recommendation: [facility] vs Home with [facility] PT  Equipment recommendation: None     Prior Level of Function: Patient ambulated with [employer] PTA. Rehab Potential: Fair+ for baseline    Past medical history:   Past Medical History:   Diagnosis Date    Arthritis     History of blood transfusion     Hyperlipidemia     Hypertension     Rheumatoid arthritis(714.0)     Thyroid disease      Past Surgical History:   Procedure Laterality Date    CARPAL TUNNEL RELEASE      right    CHOLECYSTECTOMY      ELBOW SURGERY Left 1998    INGUINAL HERNIA REPAIR Right     JOINT REPLACEMENT      Lt. Elbow,Lt. Knee    KNEE SURGERY Left 1990    UPPER GASTROINTESTINAL ENDOSCOPY N/A 7/22/2020    EGD BIOPSY performed by Aida Peralta MD at [facility] ENDOSCOPY       Precautions: Multiple Falls    SUBJECTIVE:    Social history: Patient lives alone but has children local who alternate spending the night with her in a 1 story house with 5 steps to enter with 1 hand rail(s).     Equipment owned: [facility], raised toilet seat    AM-PAC Basic Mobility   16/24    AM-PAC Mobility Inpatient   How much difficulty turning over in bed?: A Little  How much difficulty sitting down on / standing up from a chair with arms?: A Little  How much difficulty moving from lying on back to sitting on side of bed?: A Little  How much help from another person moving to and from a bed to a chair?: A Little  How much help from another person needed to walk in hospital room?: A Lot  How much help from another person for climbing 3-5 steps with a railing?: A Lot  AM-PAC Inpatient Mobility Raw Score : 16  AM-PAC Inpatient T-Scale Score : 40.78  Mobility Inpatient CMS 0-100% Score: 54.16  Mobility Inpatient CMS G-Code Modifier : CK    Nursing cleared patient for PT evaluation. The admitting diagnosis and active problem list as listed above have been reviewed prior to the initiation of this evaluation. OBJECTIVE:   Initial Evaluation  Date: 7/23/2020 Treatment Date:     Short Term/ Long Term   Goals   Was pt agreeable to Eval/treatment? Y  To be met in 3-5 days   Pain level   5/10 in  B shoulders     Bed Mobility  Rolling: Supervision    Supine to sit: Supervision    Sit to supine: Supervision    Scooting: Supervision   Rolling: IND  Supine to sit: IND  Sit to supine: IND  Scooting: IND   Transfers Sit to stand: Minimal assist of 1   Sit to stand: IND   Ambulation   20 feet using  wheeled walker with Moderate assist of 1   >100' with [employer] with Mod I   Stair negotiation: ascended and descended Not assessed      ROM WFL   Increase range of motion 10% of affected joints    Strength BUE: Refer to OT Eval  RLE:  3+/5  LLE:  3+/5  Increase strength in affected mm groups by 1/3-1/2 grade   Balance Sitting EOB: SBA  Dynamic Standing: ModA  Sitting EOB: IND  Dynamic Standing: Mod I     Patient is Alert & Oriented x 4  Sensation:  Patient denies numbness and tingling to extremities. Edema: None  Endurance: Fair-    Patient education  Pt educated on safety awareness, energy conservation, proper hand placement and sequencing for bed mobility and transfers, PLB throughout function to help regulate HR and O2 Saturation.     Patient response to education:   Pt verbalized understanding Pt demonstrated skill Pt requires further education in this area   y partial y and family education will also be administered as needed. Frequency of treatments: 1-2x daily for 5-7x per week for 3-5 days.     Time in  1502  Time out  1535    (Evaluation time includes thorough review of current medical information, gathering information on past medical history/social history and prior level of function, completion of standardized testing/informal observation of tasks, assessment of data, and development of POC/Goals.)    CPT codes:   [x] Low Complexity PT evaluation 21643  [] Moderate Complexity PT evaluation 93621  [] High Complexity PT evaluation 70016  [] PT Re-evaluation 93646  [] Gait training 65226   [] Manual therapy 39753   [x] Therapeutic activities 24319       25 minutes  [] Therapeutic exercises 73346       [] Neuromuscular reeducation [facility], PT, DPT   [id number]

## 2020-07-23 NOTE — PROGRESS NOTES
Department of Internal Medicine  General Internal Medicine  Attending Progress Note      Subjective: The patient is awake and alert. No problems overnight. Denies chest pain, angina, and dyspnea. Denies abdominal pain. Tolerating diet. No nausea or vomiting. Has bruising all over her face and bilateral arms with dried blood in the nostrils status post fall few days ago. Objective:  Pt oriented, alert, coherent and logical    /77   Pulse 95   Temp 97.9 °F (36.6 °C) (Oral)   Resp 16   Ht 4' 11\" (1.499 m)   Wt 108 lb (49 kg)   SpO2 94%   BMI 21.81 kg/m²     Head and Neck: normal with multiple bruising on forehead and bilateral cheeks and nasal bridge no neck masses, normal thyroid, no jvd    Heart:  regular rate and rhythm, S1, S2 normal, no murmur, click, rub or gallop    Lungs:  chest clear, no wheezing, rales, normal symmetric air entry, no chest wall deformities or tenderness    Abd: soft, non-tender, without masses or organomegaly    Extrem:  No clubbing, cyanosis, or edema    Neuro: no focal neurological deficit     Skin: No rashes, lesions, , no ulcers. Psych: No apparent anxiety, agitation, or depression.      Labs:   CBC:   Lab Results   Component Value Date    WBC 15.1 07/21/2020    RBC 2.27 07/21/2020    HGB 8.3 07/23/2020    HCT 26.4 07/23/2020    MCV 98.7 07/21/2020    MCH 32.2 07/21/2020    MCHC 32.6 07/21/2020    RDW 13.3 07/21/2020     07/21/2020    MPV 9.1 07/21/2020     CMP:    Lab Results   Component Value Date     07/22/2020    K 3.9 07/22/2020    K 4.1 07/21/2020     07/22/2020    CO2 22 07/22/2020    BUN 56 07/22/2020    CREATININE 0.9 07/22/2020    GFRAA >60 07/22/2020    LABGLOM 59 07/22/2020    GLUCOSE 94 07/22/2020    GLUCOSE 101 03/28/2011    PROT 4.9 07/22/2020    LABALBU 3.0 07/22/2020    LABALBU 3.9 03/28/2011    CALCIUM 8.3 07/22/2020    BILITOT 1.3 07/22/2020    ALKPHOS 40 07/22/2020    AST 24 07/22/2020    ALT 11 07/22/2020 Assessment:    Active Problems:    RA (rheumatoid arthritis) (HCC)    Anemia    Essential hypertension    Gastric ulcer  Resolved Problems:    * No resolved hospital problems. *      Plan:  1. Continue with Protonix daily  2. PT OT  3. Refer to nursing home for rehabilitation  4.  Check facial bone x-ray    See orders    Electronically signed by Demetra Thomson MD on 7/23/2020 at 12:45 PM

## 2020-07-23 NOTE — PLAN OF CARE
Problem: Falls - Risk of:  Goal: Will remain free from falls  7/23/2020 1309 by Roxane Angulo RN  Outcome: Met This Shift     Problem: Falls - Risk of:  Goal: Absence of physical injury  7/23/2020 1309 by Roxane Angulo RN  Outcome: Met This Shift     Problem: Skin Integrity:  Goal: Will show no infection signs and symptoms  7/23/2020 1309 by Roxane Angulo RN  Outcome: Met This Shift     Problem: Skin Integrity:  Goal: Absence of new skin breakdown  7/23/2020 1309 by Roxane Angulo RN  Outcome: Met This Shift     Problem: Infection - Surgical Site:  Goal: Will show no infection signs and symptoms  7/23/2020 1309 by Roxane Angulo RN  Outcome: Met This Shift

## 2020-07-23 NOTE — PROGRESS NOTES
PROGRESS NOTE    By Marla Tiwari D.O., GI Fellow    [facility]  Dr. Mary Moses M.D., Dr. Rich Norton M.D., Dr. Ilene Pollack, Dr. Fam Olsen M.D., Dr. Sarah Wolfe  80 y.o.  female    SUBJECTIVE:    No acute events overnight. Patient tolerating diet and endorses diffuse arthralgias and myalgias. Discussed findings on EGD and recommendations for daily PPI. Patient voiced understanding     OBJECTIVE:    /77   Pulse 95   Temp 97.9 °F (36.6 °C) (Oral)   Resp 16   Ht 4' 11\" (1.499 m)   Wt 108 lb (49 kg)   SpO2 94%   BMI 21.81 kg/m²   GEN: A&Ox3, friendly, NAD  HEENT:PEERL, no icterus  Heart: RRR  Lungs: CTAB  Abd.: soft, NT, ND, BS +, no G/R, no HSM  Extr.: no C/C/E, no brusing         Lab Results   Component Value Date    WBC 15.1 07/21/2020    WBC 10.6 06/27/2019    WBC 12.9 04/27/2019    HGB 8.2 07/23/2020    HGB 8.1 07/22/2020    HGB 8.5 07/22/2020    HCT 25.6 07/23/2020    MCV 98.7 07/21/2020    RDW 13.3 07/21/2020     07/21/2020     06/27/2019     04/27/2019     Lab Results   Component Value Date     07/22/2020    K 3.9 07/22/2020    K 4.1 07/21/2020     07/22/2020    CO2 22 07/22/2020    BUN 56 07/22/2020    CREATININE 0.9 07/22/2020    CALCIUM 8.3 07/22/2020    PROT 4.9 07/22/2020    LABALBU 3.0 07/22/2020    LABALBU 3.9 03/28/2011    BILITOT 1.3 07/22/2020    BILITOT 0.7 07/21/2020    BILITOT 0.7 06/27/2019    ALKPHOS 40 07/22/2020    ALKPHOS 47 07/21/2020    ALKPHOS 61 06/27/2019    AST 24 07/22/2020    AST 24 07/21/2020    AST 18 06/27/2019    ALT 11 07/22/2020    ALT 14 07/21/2020    ALT 10 06/27/2019     Lab Results   Component Value Date    LIPASE 34 06/27/2019    LIPASE 41 04/27/2019    LIPASE 41 07/17/2018     No results found for: AMYLASE      ASSESSMENT/PLAN:  1.  Symptomatic blood loss anemia secondary to a GI bleed  -Normocytic normochromic anemia   -s/p EGD on 07-22-20 remarkable for an 8 cm hiatal hernia, clean based antral ulcer, and antral gastritis. No fresh or old blood  -Anemia and melena likely secondary to findings on EGD  -Daily PPI   -Serial H/H stable  -Daily labs   -Okay for general diet   -Continue supportive care         2. Rheumatoid arthritis/hypertension/hyperlipidemia  -per primary      -Will discuss with attending     Carlos Aguirre DO  7/23/2020  11:22 AM    Pt seen and independently examined. Pertinent notes and lab work reviewed. D/w Dr. Hoa Valente with physical exam and A&P. Discussed with patient - all questions answered - agreeable with the plan as delineated.     Nelda Anderson MD  7/23/2020  12:13 PM Pt stable for discharge per Orthopedic Surgeon.

## 2020-07-23 NOTE — PLAN OF CARE
Problem: Falls - Risk of:  Goal: Will remain free from falls  Description: Will remain free from falls  7/23/2020 0243 by Sherri Solorzano RN  Outcome: Met This Shift     Problem: Falls - Risk of:  Goal: Absence of physical injury  Description: Absence of physical injury  7/23/2020 0243 by Sherri Solorzano RN  Outcome: Met This Shift     Problem: Skin Integrity:  Goal: Will show no infection signs and symptoms  Description: Will show no infection signs and symptoms  7/23/2020 0243 by Sherri Solorzano RN  Outcome: Met This Shift     Problem: Skin Integrity:  Goal: Absence of new skin breakdown  Description: Absence of new skin breakdown  7/23/2020 0243 by Sherri Solorzano RN  Outcome: Met This Shift     Problem: Pain:  Goal: Pain level will decrease  Description: Pain level will decrease  7/23/2020 0243 by Sherri Solorzano RN  Outcome: Met This Shift     Problem: Pain:  Goal: Control of acute pain  Description: Control of acute pain  7/23/2020 0243 by Sherri Solorzano RN  Outcome: Met This Shift     Problem: Pain:  Goal: Control of chronic pain  Description: Control of chronic pain  7/23/2020 0243 by Sherri Solorzano RN  Outcome: Met This Shift     Problem: Infection - Surgical Site:  Goal: Will show no infection signs and symptoms  Description: Will show no infection signs and symptoms  7/23/2020 0243 by Sherri Solorzano RN  Outcome: Met This Shift

## 2020-07-24 LAB
HCT VFR BLD CALC: 26.1 % (ref 34–48)
HEMOGLOBIN: 8.2 G/DL (ref 11.5–15.5)
MCH RBC QN AUTO: 31.7 PG (ref 26–35)
MCHC RBC AUTO-ENTMCNC: 31.4 % (ref 32–34.5)
MCV RBC AUTO: 100.8 FL (ref 80–99.9)
PDW BLD-RTO: 15 FL (ref 11.5–15)
PLATELET # BLD: 212 E9/L (ref 130–450)
PMV BLD AUTO: 9.4 FL (ref 7–12)
RBC # BLD: 2.59 E12/L (ref 3.5–5.5)
WBC # BLD: 9.1 E9/L (ref 4.5–11.5)

## 2020-07-24 PROCEDURE — 85027 COMPLETE CBC AUTOMATED: CPT

## 2020-07-24 PROCEDURE — 97165 OT EVAL LOW COMPLEX 30 MIN: CPT

## 2020-07-24 PROCEDURE — 6370000000 HC RX 637 (ALT 250 FOR IP): Performed by: SURGERY

## 2020-07-24 PROCEDURE — 6370000000 HC RX 637 (ALT 250 FOR IP): Performed by: INTERNAL MEDICINE

## 2020-07-24 PROCEDURE — 2580000003 HC RX 258: Performed by: SURGERY

## 2020-07-24 PROCEDURE — 1200000000 HC SEMI PRIVATE

## 2020-07-24 PROCEDURE — 97535 SELF CARE MNGMENT TRAINING: CPT

## 2020-07-24 PROCEDURE — 97530 THERAPEUTIC ACTIVITIES: CPT

## 2020-07-24 PROCEDURE — 36415 COLL VENOUS BLD VENIPUNCTURE: CPT

## 2020-07-24 RX ADMIN — GABAPENTIN 300 MG: 300 CAPSULE ORAL at 21:23

## 2020-07-24 RX ADMIN — Medication 10 ML: at 10:28

## 2020-07-24 RX ADMIN — ACETAMINOPHEN 650 MG: 325 TABLET, FILM COATED ORAL at 21:23

## 2020-07-24 RX ADMIN — LOSARTAN POTASSIUM 50 MG: 50 TABLET, FILM COATED ORAL at 10:23

## 2020-07-24 RX ADMIN — ACETAMINOPHEN 650 MG: 325 TABLET, FILM COATED ORAL at 16:02

## 2020-07-24 RX ADMIN — SALINE NASAL SPRAY 1 SPRAY: 1.5 SOLUTION NASAL at 06:54

## 2020-07-24 RX ADMIN — PANTOPRAZOLE SODIUM 40 MG: 40 TABLET, DELAYED RELEASE ORAL at 06:49

## 2020-07-24 RX ADMIN — METOPROLOL TARTRATE 50 MG: 50 TABLET, FILM COATED ORAL at 10:24

## 2020-07-24 RX ADMIN — GABAPENTIN 100 MG: 100 CAPSULE ORAL at 16:00

## 2020-07-24 RX ADMIN — ACETAMINOPHEN 650 MG: 325 TABLET, FILM COATED ORAL at 06:52

## 2020-07-24 RX ADMIN — Medication 10 ML: at 21:26

## 2020-07-24 RX ADMIN — MELATONIN 1000 UNITS: at 10:24

## 2020-07-24 RX ADMIN — LEVOTHYROXINE SODIUM 75 MCG: 75 TABLET ORAL at 06:49

## 2020-07-24 RX ADMIN — GABAPENTIN 100 MG: 100 CAPSULE ORAL at 10:27

## 2020-07-24 ASSESSMENT — PAIN SCALES - GENERAL
PAINLEVEL_OUTOF10: 5
PAINLEVEL_OUTOF10: 5
PAINLEVEL_OUTOF10: 3
PAINLEVEL_OUTOF10: 2
PAINLEVEL_OUTOF10: 2

## 2020-07-24 ASSESSMENT — PAIN DESCRIPTION - PAIN TYPE: TYPE: CHRONIC PAIN

## 2020-07-24 ASSESSMENT — PAIN DESCRIPTION - LOCATION: LOCATION: GENERALIZED

## 2020-07-24 ASSESSMENT — PAIN DESCRIPTION - DESCRIPTORS: DESCRIPTORS: ACHING;DISCOMFORT;DULL

## 2020-07-24 ASSESSMENT — PAIN - FUNCTIONAL ASSESSMENT: PAIN_FUNCTIONAL_ASSESSMENT: PREVENTS OR INTERFERES SOME ACTIVE ACTIVITIES AND ADLS

## 2020-07-24 NOTE — PROGRESS NOTES
OCCUPATIONAL THERAPY  Initial Evaluation  Date:2020  Patient Name: Afua Ramos  MRN: [id number]  : 1929  ROOM #: [id number]     Referring Provider: Sarah Buckner MD   Evaluating OT: Analilia Everett OTR/L [id number]    Placement Recommendation: [facility] with / supervision vs. Subacute if goals are not met   Recommended Adaptive Equipment: none     [facility]   AM-PAC Daily Activity Inpatient   How much help for putting on and taking off regular lower body clothing?: A Lot  How much help for Bathing?: A Lot  How much help for Toileting?: A Lot  How much help for putting on and taking off regular upper body clothing?: A Lot  How much help for taking care of personal grooming?: A Lot  How much help for eating meals?: None  AM-PAC Inpatient Daily Activity Raw Score: 14  AM-PAC Inpatient ADL T-Scale Score : 33.39  ADL Inpatient CMS 0-100% Score: 59.67  ADL Inpatient CMS G-Code Modifier : CK    Diagnosis:   1. Anemia, unspecified type    2. Gastrointestinal hemorrhage with melena    3. Fall from ground level    4. Elevated troponin      Pertinent Medical History:   Past Medical History:   Diagnosis Date    Arthritis     History of blood transfusion     Hyperlipidemia     Hypertension     Rheumatoid arthritis(714.0)     Thyroid disease       Precautions:  falls, facial bruising  Comment: assisted pt in calling son, pt requested son have his sister/her daughter bring in her dentures, glasses, and shoes. Pain Scale: Numeric Rate: 5/10 in chest pain since falling over walker; Nursing notified. Social history: alone but children rotate spending the night with her, son, Delma Galindo lives 4 houses down     Home architecture: single family home, 1 story, 5 steps to enter with rail, walk in shower and unused garden tub. PLOF: needs assistance with BADL and IADL, ambulated with wheeled walker, adult children and private aide assist with BADL's and IADL's.     Equipment owned: wheeled walker, straight cane, shower chair, grab transfer/mobility tasks. Pt would benefit from continued skilled OT to increase safety and independence with completion of ADL/IADL tasks for functional independence and quality of life. Treatment: Therapist facilitated bed mobility. Sitting balance at EOB to increase dynamic sitting balance and activity tolerance. Transfer training with verbal cues to for hand placement throughout evaluation to improve safety, static standing balance with wheeled walker to improve balance, functional mobility with wheeled walker to improve balance, verbal cues for walker sequence and safety. Moderate verbal cues to use grab bar for safe commode transfer. Assisted pt in doffing tape/cotton ball and applying Band-Aid. Pt ambulated in hallway with wheeled walker. Pt returned to room and was seated in bedside chair. OT services provided to include instruction/training on safety and adapted techniques for completion of transfers and ADL's. Evaluation Complexity:  · Low Complexity  · History: Brief history including review of medical records relating to the problem  · Exam: 1-3 performance Deficits  · Assistance/Modification: No assistance or modifications required to perform tasks. No comorbities affecting occupational performance.     Assessment of current deficits   Functional mobility [x]        ADLs [x]        Strength [x]      Cognition []  Functional transfers  [x]       IADLs [x]        Safety Awareness []      Endurance [x]  Fine Motor Coordination []       Balance [x]       Vision/perception []     Sensation []   Gross Motor Coordination []       ROM []         Delirium []                          Motor Control []    Plan of Care: OT 1-3x/week PRN during hospitalization  ADL retraining [x]   Equipment needs [x]   Neuromuscular re-education [] Energy Conservation Techniques [x]  Functional Transfer training [x] Patient and/or Family Education [x]  Functional Mobility training [x]  Environmental Modifications []  Cognitive re-training []   Compensatory techniques for ADLs [x]  Splinting Needs []   Positioning to improve overall function [x]   Therapeutic Activity [x]  Therapeutic Exercise  []  Visual/Perceptual: []    Delirium prevention/treatment  []  Other:  []    Rehab Potential: Good for established goals developed with patient and family. Patient / Family Goal: return home      Patient and/or family were instructed on functional diagnosis, prognosis/goals and OT plan of care. Demonstrated fair understanding. Time In: 9:15 am    Time Out: 9:48 am                 Treatment Charges: Mins Units   ADL/Home Mgt                    58985 11    Therapeutic Activities          55430 15    Therapeutic Exercise          24869     Manual Therapy                  07918     Neuro Re-ed                       34015     Orthotic manage/training    83072     Total Timed Treatment 26 2     Evaluation time includes thorough review of current medical information, gathering information on past medical history/social history and prior level of function, completion of standardized testing/informal observation of tasks, assessment of data, and development of POC/Goals.     Khai Stearns OTR/L [id number]

## 2020-07-24 NOTE — DISCHARGE INSTR - COC
 Closed fracture of right hand S62. 91XA    Closed fracture of maxillary sinus (HCC) S02.401A    Anemia D64.9    Essential hypertension I10    Gastric ulcer K25.9       Isolation/Infection:   Isolation          No Isolation        Patient Infection Status     None to display          Nurse Assessment:  Last Vital Signs: /64   Pulse 68   Temp 98.7 °F (37.1 °C) (Oral)   Resp 16   Ht 4' 11\" (1.499 m)   Wt 108 lb (49 kg)   SpO2 93%   BMI 21.81 kg/m²     Last documented pain score (0-10 scale): Pain Level: 3  Last Weight:   Wt Readings from Last 1 Encounters:   07/21/20 108 lb (49 kg)     Mental Status:  alert    IV Access:  - None    Nursing Mobility/ADLs:  Walking   Assisted  Transfer  Assisted  Bathing  Assisted  Dressing  Assisted  Toileting  Assisted  Feeding  Assisted  Med Admin  Assisted  Med Delivery   whole    Wound Care Documentation and Therapy:        Elimination:  Continence:   · Bowel: Yes  · Bladder: Yes  Urinary Catheter: Removal Date 7/26/2020   Colostomy/Ileostomy/Ileal Conduit: No       Date of Last BM: 7/25/2020    Intake/Output Summary (Last 24 hours) at 7/24/2020 1159  Last data filed at 7/24/2020 1113  Gross per 24 hour   Intake 900 ml   Output 525 ml   Net 375 ml     I/O last 3 completed shifts: In: 910 [P.O.:900; I.V.:10]  Out: 525 [Urine:525]    Safety Concerns:     History of Falls (last 30 days) and At Risk for Falls    Impairments/Disabilities:      None    Nutrition Therapy:  Current Nutrition Therapy:   - Oral Diet:  General    Routes of Feeding: Oral  Liquids:  Thin Liquids  Daily Fluid Restriction: no  Last Modified Barium Swallow with Video (Video Swallowing Test): not done    Rehab Therapies: Physical Therapy and Occupational Therapy  Weight Bearing Status/Restrictions: No weight bearing restirctions  Other Medical Equipment (for information only, NOT a DME order):  walker  Other Treatments: ***    Patient's personal belongings (please select all that are sent with patient):  None    RN SIGNATURE:  Electronically signed by Haseeb Aggarwal RN on 7/26/20 at 2:35 PM EDT    CASE MANAGEMENT/SOCIAL WORK SECTION    Inpatient Status Date: 07/21/20    Readmission Risk Assessment Score:  Readmission Risk              Risk of Unplanned Readmission:        17           Discharging to Facility/ Agency   · Name: [facility] [place], [address], Fax [phone],      Dialysis Facility (if applicable)   · Name:  · Address:  · Dialysis Schedule:  · Phone:  · Fax:    / signature: Electronically signed by JAMEL Colón on 7/24/2020 at 12:51 PM      PHYSICIAN SECTION    Prognosis: {Prognosis:7996245077}    Condition at Discharge: Rupinder Blanco Patient Condition:[id number]}    Rehab Potential (if transferring to Rehab): {Prognosis:3897589610}    Recommended Labs or Other Treatments After Discharge: ***    Physician Certification: I certify the above information and transfer of Jason Marie  is necessary for the continuing treatment of the diagnosis listed and that she requires [place] for less 30 days.      Update Admission H&P: {CHP DME Changes in YFYQZ:154627989}    PHYSICIAN SIGNATURE:  Electronically signed by Kal Mattson MD on 7/24/20 at 12:01 PM EDT

## 2020-07-24 NOTE — CARE COORDINATION
SS Note: SW Consult received for \"Nursing home placement for rehabilitation\", SW met with pt and she was in agreement with SNF rehab. SW reviewed SNF list, pt requested [address]. SW also called and spoke with pt's son [place] Rachel, [phone], and he is agreement with plan. SW called referral to [place] at [facility], a bed is available and she is reviewing, [facility] vs [facility] if accepted. The Plan for Transition of Care is related to the following treatment goals: SNF    The Patient and/or patient representative were provided with a choice of provider and agrees   with the discharge plan. [x] Yes [] No    Freedom of choice list was provided with basic dialogue that supports the patient's individualized plan of care/goals, treatment preferences and shares the quality data associated with the providers. [x] Yes [] No    Electronically signed by JAMEL Jennings on 7/24/2020 at 11:13 AM     Addendum: [facility] [place], [address], Fax [phone], has accepted pt and [facility]ITIATING [facility], pt does not qualify for [facility] per liaison. HENS Exemption form completed. Electronic CEASAR in pt's EPIC Chart for physician signature. Electronically signed by JAMEL Jennings on 7/24/2020 at 12:43 PM      Addendum: SNF still waiting for [address].   Electronically signed by JAMEL Jennings on 7/24/2020 at 4:08 PM

## 2020-07-24 NOTE — PLAN OF CARE
Problem: Falls - Risk of:  Goal: Will remain free from falls  Description: Will remain free from falls  7/24/2020 0300 by Ran Garcia RN  Outcome: Met This Shift     Problem: Falls - Risk of:  Goal: Absence of physical injury  Description: Absence of physical injury  7/24/2020 0300 by Ran Garcia RN  Outcome: Met This Shift     Problem: Skin Integrity:  Goal: Will show no infection signs and symptoms  Description: Will show no infection signs and symptoms  7/24/2020 0300 by Ran Garcia RN  Outcome: Met This Shift     Problem: Skin Integrity:  Goal: Absence of new skin breakdown  Description: Absence of new skin breakdown  7/24/2020 0300 by Ran Garcia RN  Outcome: Met This Shift     Problem: Pain:  Goal: Pain level will decrease  Description: Pain level will decrease  7/24/2020 0300 by Ran Garcia RN  Outcome: Met This Shift     Problem: Pain:  Goal: Control of acute pain  Description: Control of acute pain  7/24/2020 0300 by Ran Garcia RN  Outcome: Met This Shift     Problem: Pain:  Goal: Control of chronic pain  Description: Control of chronic pain  7/24/2020 0300 by Ran Garcia RN  Outcome: Met This Shift     Problem: Infection - Surgical Site:  Goal: Will show no infection signs and symptoms  Description: Will show no infection signs and symptoms  7/24/2020 0300 by Ran Garcia RN  Outcome: Met This Shift

## 2020-07-24 NOTE — PROGRESS NOTES
Department of Internal Medicine  General Internal Medicine  Attending Progress Note      Subjective: The patient is awake and alert. No problems overnight. Denies chest pain, angina, and dyspnea. Denies abdominal pain. Tolerating diet. No nausea or vomiting. Objective:  Pt oriented, alert, coherent and logical    /64   Pulse 68   Temp 98.7 °F (37.1 °C) (Oral)   Resp 16   Ht 4' 11\" (1.499 m)   Wt 108 lb (49 kg)   SpO2 93%   BMI 21.81 kg/m²     Head and Neck: normal , facial ecchymosis, no neck masses, normal thyroid, no jvd    Heart:  regular rate and rhythm, S1, S2 normal, no murmur, click, rub or gallop    Lungs:  chest clear, no wheezing, rales, normal symmetric air entry, no chest wall deformities or tenderness    Abd: soft, non-tender, without masses or organomegaly    Extrem:  No clubbing, cyanosis, or edema, bilateral hand fingers ulnar deviation deformity due to rheumatoid arthritis    Neuro: no focal neurological deficit     Skin: No rashes, lesions, or ecchymosis, no ulcers. Psych: No apparent anxiety, agitation, or depression. Labs: Hemoglobin 8.2 gm  EGD on 07-22-20 remarkable for an 8 cm hiatal hernia, clean based antral ulcer, and antral gastritis. No fresh or old blood  Assessment:    Active Problems:    RA (rheumatoid arthritis) (HCC)    Anemia    Essential hypertension    Gastric ulcer  Resolved Problems:    * No resolved hospital problems. *      Plan:  Iron supplement  Follow CBC  Transfer to nursing home for rehab. Patient was accepted in [address] awaiting insurance approval for coverage.   See orders    Electronically signed by Ifeanyi Fletcher MD on 7/24/2020 at 11:50 AM

## 2020-07-24 NOTE — PROGRESS NOTES
Physician Progress Note      PATIENT:               Nahomy Arroyo  CSN #:                  [id number]  :                       1929  ADMIT DATE:       2020 12:42 PM  [address] DATE:  RESPONDING  PROVIDER #:        Marilyn Crouch MD          QUERY TEXT:    Dear Attending Provider and/or GI Team,    Pt admitted with GI Bleed and has anemia documented. If possible, please   document in progress notes and discharge summary further specificity regarding   the acuity and type of anemia:    The medical record reflects the following:  Risk Factors: RA, HTN, Hyperlipidemia, fall due to recent weakness/fatigue  Clinical Indicators: per GI Consult note: Symptomatic blood loss anemia   secondary to a GI bleed. Claudio Beltrán H/H in ED was 7.3/22.4 and from her past lab draws   she is usually in the  to 13/ range.; H/H: 6.9/21.4-8.5/27.9; per H&P:   Hypotension and anemia due to GI blood loss  Treatment: GI Consult, EGD showing   Clean based 5mm antral ulcer and   gastritis, transfuse 1 URBC, monitor H&H Q 6hrs, IV PPI gtt    Thank You,  Lauro Ricci RN, BSN, CDS  Clinical Documentation Improvement Specialist  [phone]  Options provided:  -- Anemia due to acute blood loss  -- Anemia due to chronic blood loss  -- Anemia due to iron deficiency  -- Other - I will add my own diagnosis  -- Disagree - Clinically unable to determine / Unknown  -- Refer to Clinical Documentation Reviewer    PROVIDER RESPONSE TEXT:    This patient has acute blood loss anemia.     Query created by: Preethi Morillo on 2020 11:29 AM      Electronically signed by:  Marilyn Crouch MD 2020 10:53 AM

## 2020-07-24 NOTE — PROGRESS NOTES
PROGRESS NOTE    By Christiane Roberts D.O., GI Fellow    [facility] M.D., Dr. Marcie Haley M.D., Dr. Micheline Salmon., Dr. Shey Miller M.D., Dr. Jesenia Urias  80 y.o.  female    SUBJECTIVE:    No acute events overnight. Patient tolerating diet and endorses improvement of arthralgias and myalgias. Working with PT/OT      OBJECTIVE:    /64   Pulse 68   Temp 98.7 °F (37.1 °C) (Oral)   Resp 16   Ht 4' 11\" (1.499 m)   Wt 108 lb (49 kg)   SpO2 93%   BMI 21.81 kg/m²   GEN: A&Ox3, friendly, NAD  HEENT:PEERL, no icterus  Heart: RRR  Lungs: CTAB  Abd.: soft, NT, ND, BS +, no G/R, no HSM  Extr.: no C/C/E, no brusing         Lab Results   Component Value Date    WBC 9.1 07/24/2020    WBC 15.1 07/21/2020    WBC 10.6 06/27/2019    HGB 8.2 07/24/2020    HGB 8.3 07/23/2020    HGB 8.2 07/23/2020    HCT 26.1 07/24/2020    .8 07/24/2020    RDW 15.0 07/24/2020     07/24/2020     07/21/2020     06/27/2019     Lab Results   Component Value Date     07/23/2020    K 3.8 07/23/2020    K 4.1 07/21/2020     07/23/2020    CO2 19 07/23/2020    BUN 40 07/23/2020    CREATININE 1.1 07/23/2020    CALCIUM 8.4 07/23/2020    PROT 4.9 07/22/2020    LABALBU 3.0 07/22/2020    LABALBU 3.9 03/28/2011    BILITOT 1.3 07/22/2020    BILITOT 0.7 07/21/2020    BILITOT 0.7 06/27/2019    ALKPHOS 40 07/22/2020    ALKPHOS 47 07/21/2020    ALKPHOS 61 06/27/2019    AST 24 07/22/2020    AST 24 07/21/2020    AST 18 06/27/2019    ALT 11 07/22/2020    ALT 14 07/21/2020    ALT 10 06/27/2019     Lab Results   Component Value Date    LIPASE 34 06/27/2019    LIPASE 41 04/27/2019    LIPASE 41 07/17/2018     No results found for: AMYLASE      ASSESSMENT/PLAN:  1. Symptomatic blood loss anemia secondary to a GI bleed  -Normocytic normochromic anemia   -s/p EGD on 07-22-20 remarkable for an 8 cm hiatal hernia, clean based antral ulcer, and antral gastritis.  No fresh or old blood  -Anemia and melena likely secondary to findings on EGD  -Daily PPI   -Daily labs   -Okay for general diet   -Continue supportive care         2. Rheumatoid arthritis/hypertension/hyperlipidemia  -per primary      -Will discuss with attending     Dominic Suarez DO  7/24/2020  10:45 AM    Pt seen and independently examined. Pertinent notes and lab work reviewed. D/w Dr. Armando Bass. Agree with physical exam and A&P. Discussed with patient and with her son ([phone].)  over the phone - all questions answered - agreeable with the plan as delineated.     Yasmany Alcantara MD  7/24/2020  11:24 AM

## 2020-07-24 NOTE — PLAN OF CARE
Problem: Falls - Risk of:  Goal: Will remain free from falls  Description: Will remain free from falls  7/23/2020 2203 by Claudia Fitch RN  Outcome: Met This Shift     Problem: Falls - Risk of:  Goal: Absence of physical injury  Description: Absence of physical injury  7/23/2020 2203 by Claudia Fitch RN  Outcome: Met This Shift     Problem: Skin Integrity:  Goal: Will show no infection signs and symptoms  Description: Will show no infection signs and symptoms  7/23/2020 2203 by Claudia Fitch RN  Outcome: Met This Shift     Problem: Skin Integrity:  Goal: Absence of new skin breakdown  Description: Absence of new skin breakdown  7/23/2020 2203 by Claudia Fitch RN  Outcome: Met This Shift     Problem: Pain:  Goal: Pain level will decrease  Description: Pain level will decrease  Outcome: Met This Shift     Problem: Pain:  Goal: Control of acute pain  Description: Control of acute pain  Outcome: Met This Shift     Problem: Pain:  Goal: Control of chronic pain  Description: Control of chronic pain  Outcome: Met This Shift     Problem: Infection - Surgical Site:  Goal: Will show no infection signs and symptoms  Description: Will show no infection signs and symptoms  7/23/2020 2203 by Claudia Fitch RN  Outcome: Met This Shift

## 2020-07-25 PROCEDURE — 1200000000 HC SEMI PRIVATE

## 2020-07-25 PROCEDURE — 6370000000 HC RX 637 (ALT 250 FOR IP): Performed by: INTERNAL MEDICINE

## 2020-07-25 PROCEDURE — 6370000000 HC RX 637 (ALT 250 FOR IP): Performed by: SURGERY

## 2020-07-25 PROCEDURE — 2580000003 HC RX 258: Performed by: SURGERY

## 2020-07-25 RX ORDER — POLYETHYLENE GLYCOL 3350 17 G/17G
17 POWDER, FOR SOLUTION ORAL DAILY
Status: DISCONTINUED | OUTPATIENT
Start: 2020-07-25 | End: 2020-07-26 | Stop reason: HOSPADM

## 2020-07-25 RX ORDER — AMPICILLIN TRIHYDRATE 250 MG
1 CAPSULE ORAL DAILY
Status: DISCONTINUED | OUTPATIENT
Start: 2020-07-25 | End: 2020-07-25 | Stop reason: CLARIF

## 2020-07-25 RX ORDER — MINERAL OIL, LIGHT/MINERAL OIL 1%-4.5%
1 DROPS OPHTHALMIC (EYE) PRN
Status: DISCONTINUED | OUTPATIENT
Start: 2020-07-25 | End: 2020-07-26 | Stop reason: HOSPADM

## 2020-07-25 RX ORDER — PREDNISONE 1 MG/1
2.5 TABLET ORAL DAILY
Status: DISCONTINUED | OUTPATIENT
Start: 2020-07-25 | End: 2020-07-26 | Stop reason: HOSPADM

## 2020-07-25 RX ORDER — HYDROXYCHLOROQUINE SULFATE 200 MG/1
200 TABLET, FILM COATED ORAL DAILY
Status: DISCONTINUED | OUTPATIENT
Start: 2020-07-25 | End: 2020-07-26 | Stop reason: HOSPADM

## 2020-07-25 RX ADMIN — PANTOPRAZOLE SODIUM 40 MG: 40 TABLET, DELAYED RELEASE ORAL at 05:51

## 2020-07-25 RX ADMIN — GABAPENTIN 100 MG: 100 CAPSULE ORAL at 14:42

## 2020-07-25 RX ADMIN — GABAPENTIN 300 MG: 300 CAPSULE ORAL at 20:16

## 2020-07-25 RX ADMIN — SALINE NASAL SPRAY 1 SPRAY: 1.5 SOLUTION NASAL at 10:19

## 2020-07-25 RX ADMIN — PREDNISONE 2.5 MG: 5 TABLET ORAL at 20:16

## 2020-07-25 RX ADMIN — Medication 10 ML: at 20:17

## 2020-07-25 RX ADMIN — GABAPENTIN 100 MG: 100 CAPSULE ORAL at 08:22

## 2020-07-25 RX ADMIN — Medication 10 ML: at 10:25

## 2020-07-25 RX ADMIN — METOPROLOL TARTRATE 50 MG: 50 TABLET, FILM COATED ORAL at 10:21

## 2020-07-25 RX ADMIN — LEVOTHYROXINE SODIUM 75 MCG: 75 TABLET ORAL at 05:52

## 2020-07-25 RX ADMIN — LOSARTAN POTASSIUM 50 MG: 50 TABLET, FILM COATED ORAL at 10:21

## 2020-07-25 RX ADMIN — MELATONIN 1000 UNITS: at 10:22

## 2020-07-25 RX ADMIN — HYDROXYCHLOROQUINE SULFATE 200 MG: 200 TABLET, FILM COATED ORAL at 20:16

## 2020-07-25 RX ADMIN — TRAMADOL HYDROCHLORIDE 50 MG: 50 TABLET, FILM COATED ORAL at 10:29

## 2020-07-25 ASSESSMENT — PAIN SCALES - GENERAL
PAINLEVEL_OUTOF10: 5
PAINLEVEL_OUTOF10: 3
PAINLEVEL_OUTOF10: 0
PAINLEVEL_OUTOF10: 2

## 2020-07-25 NOTE — PROGRESS NOTES
PROGRESS NOTE    By Aurelia Alfaro D.O. [facility]  Dr. aMrcy Lerma MD, Dr. Helon Prader, MD, Dr Hermelindo Ramirez, Dr. Anaid Mustafa MD, Dr. Aurelia Alfaro, DO      Sidra Luis  80 y.o.  female    [address] [facility] seen and examined. No acute overnight events. Patient tolerating diet. OBJECTIVE:    /72   Pulse 80   Temp 98 °F (36.7 °C) (Oral)   Resp 16   Ht 4' 11\" (1.499 m)   Wt 108 lb (49 kg)   SpO2 92%   BMI 21.81 kg/m²     Gen: NAD, AAO x 3  HEENT:PEERL, no icterus  Heart: RRR, no M/R/G  Lungs: CTAB  Abd.: soft, NT, ND, BS +, no G/R, no HSM  Extr.: no C/C/E, no bruising         Lab Results   Component Value Date    WBC 9.1 07/24/2020    WBC 15.1 07/21/2020    WBC 10.6 06/27/2019    HGB 8.2 07/24/2020    HGB 8.3 07/23/2020    HGB 8.2 07/23/2020    HCT 26.1 07/24/2020    .8 07/24/2020    RDW 15.0 07/24/2020     07/24/2020     07/21/2020     06/27/2019     Lab Results   Component Value Date     07/23/2020    K 3.8 07/23/2020    K 4.1 07/21/2020     07/23/2020    CO2 19 07/23/2020    BUN 40 07/23/2020    CREATININE 1.1 07/23/2020    CALCIUM 8.4 07/23/2020    PROT 4.9 07/22/2020    LABALBU 3.0 07/22/2020    LABALBU 3.9 03/28/2011    BILITOT 1.3 07/22/2020    BILITOT 0.7 07/21/2020    BILITOT 0.7 06/27/2019    ALKPHOS 40 07/22/2020    ALKPHOS 47 07/21/2020    ALKPHOS 61 06/27/2019    AST 24 07/22/2020    AST 24 07/21/2020    AST 18 06/27/2019    ALT 11 07/22/2020    ALT 14 07/21/2020    ALT 10 06/27/2019     Lab Results   Component Value Date    LIPASE 34 06/27/2019    LIPASE 41 04/27/2019    LIPASE 41 07/17/2018     No results found for: AMYLASE      ASSESSMENT/PLAN:  1. Acute blood loss anemia secondary to a GI bleed - stable, no overt bleeding  -Normocytic normochromic anemia   -s/p EGD on 07-22-20 remarkable for an 8 cm hiatal hernia, clean based antral ulcer, and antral gastritis.  No fresh or old blood  -Anemia and melena likely secondary to findings on EGD  -Daily PPI   -Daily labs   -Okay for general diet   -Continue supportive care         2. Rheumatoid arthritis/hypertension/hyperlipidemia  -per primary     Patient is stable for discharge from GI POV. Follow up in the office in 2 weeks.   Call for appointment. Lottie Rubalcava DO  7/25/2020  10:36 AM

## 2020-07-25 NOTE — PLAN OF CARE
Problem: Falls - Risk of:  Goal: Will remain free from falls  7/25/2020 1636 by Nehemiah Jefferson RN  Outcome: Met This Shift  7/25/2020 0436 by Daija Herbert RN  Outcome: Met This Shift  Goal: Absence of physical injury  7/25/2020 1636 by Nehemiah Jefferson RN  Outcome: Met This Shift  7/25/2020 0436 by Daija Herbert RN  Outcome: Met This Shift     Problem: Pain:  Goal: Pain level will decrease  7/25/2020 1636 by Nehemiah Jefferson RN  Outcome: Met This Shift  7/25/2020 0436 by Daija Herbert RN  Outcome: Met This Shift  Goal: Control of acute pain  7/25/2020 1636 by Nehemiah Jefferson RN  Outcome: Met This Shift  7/25/2020 0436 by Daija Herbert RN  Outcome: Met This Shift  Goal: Control of chronic pain  7/25/2020 1636 by Nehemiah Jefferson RN  Outcome: Met This Shift  7/25/2020 0436 by Daija Herbert RN  Outcome: Met This Shift

## 2020-07-25 NOTE — PLAN OF CARE
Problem: Falls - Risk of:  Goal: Will remain free from falls  Description: Will remain free from falls  Outcome: Met This Shift     Problem: Falls - Risk of:  Goal: Absence of physical injury  Description: Absence of physical injury  Outcome: Met This Shift     Problem: Skin Integrity:  Goal: Will show no infection signs and symptoms  Description: Will show no infection signs and symptoms  Outcome: Met This Shift     Problem: Skin Integrity:  Goal: Absence of new skin breakdown  Description: Absence of new skin breakdown  Outcome: Met This Shift     Problem: Pain:  Goal: Pain level will decrease  Description: Pain level will decrease  Outcome: Met This Shift     Problem: Pain:  Goal: Control of acute pain  Description: Control of acute pain  Outcome: Met This Shift     Problem: Pain:  Goal: Control of chronic pain  Description: Control of chronic pain  Outcome: Met This Shift     Problem: Infection - Surgical Site:  Goal: Will show no infection signs and symptoms  Description: Will show no infection signs and symptoms  Outcome: Met This Shift

## 2020-07-25 NOTE — CARE COORDINATION
SS Note:  [facility], [address], Fax [phone], has accepted pt and have [address]. Pt can transfer to SNF whenever stable to do so. HENS Exemption form completed. Electronic CEASAR in pt's EPIC Chart for physician signature. Jesus Magallon. 7/25/2020.9:09 AM.

## 2020-07-25 NOTE — PROGRESS NOTES
Pharmacy Note    Jesse Mulligan was ordered red yeast rice 600 mg capsule. As per the [address] [place] [address], herbals and certain dietary supplements will be discontinued.   The herbal or dietary supplement may be continued after discharge from the hospital.

## 2020-07-26 VITALS
HEIGHT: 59 IN | DIASTOLIC BLOOD PRESSURE: 63 MMHG | TEMPERATURE: 97.9 F | BODY MASS INDEX: 21.77 KG/M2 | RESPIRATION RATE: 18 BRPM | WEIGHT: 108 LBS | SYSTOLIC BLOOD PRESSURE: 129 MMHG | HEART RATE: 84 BPM | OXYGEN SATURATION: 94 %

## 2020-07-26 LAB
ALBUMIN SERPL-MCNC: 3.1 G/DL (ref 3.5–5.2)
ALP BLD-CCNC: 55 U/L (ref 35–104)
ALT SERPL-CCNC: 12 U/L (ref 0–32)
ANION GAP SERPL CALCULATED.3IONS-SCNC: 12 MMOL/L (ref 7–16)
AST SERPL-CCNC: 26 U/L (ref 0–31)
BILIRUB SERPL-MCNC: 0.5 MG/DL (ref 0–1.2)
BUN BLDV-MCNC: 41 MG/DL (ref 8–23)
CALCIUM SERPL-MCNC: 8.7 MG/DL (ref 8.6–10.2)
CHLORIDE BLD-SCNC: 100 MMOL/L (ref 98–107)
CO2: 22 MMOL/L (ref 22–29)
CREAT SERPL-MCNC: 1.2 MG/DL (ref 0.5–1)
GFR AFRICAN AMERICAN: 51
GFR NON-AFRICAN AMERICAN: 42 ML/MIN/1.73
GLUCOSE BLD-MCNC: 117 MG/DL (ref 74–99)
HCT VFR BLD CALC: 28 % (ref 34–48)
HEMOGLOBIN: 8.7 G/DL (ref 11.5–15.5)
MCH RBC QN AUTO: 31.8 PG (ref 26–35)
MCHC RBC AUTO-ENTMCNC: 31.1 % (ref 32–34.5)
MCV RBC AUTO: 102.2 FL (ref 80–99.9)
PDW BLD-RTO: 14.8 FL (ref 11.5–15)
PLATELET # BLD: 332 E9/L (ref 130–450)
PMV BLD AUTO: 9.4 FL (ref 7–12)
POTASSIUM SERPL-SCNC: 5.3 MMOL/L (ref 3.5–5)
RBC # BLD: 2.74 E12/L (ref 3.5–5.5)
SARS-COV-2, NAAT: NOT DETECTED
SODIUM BLD-SCNC: 134 MMOL/L (ref 132–146)
TOTAL PROTEIN: 5.8 G/DL (ref 6.4–8.3)
WBC # BLD: 12.3 E9/L (ref 4.5–11.5)

## 2020-07-26 PROCEDURE — 85027 COMPLETE CBC AUTOMATED: CPT

## 2020-07-26 PROCEDURE — 6370000000 HC RX 637 (ALT 250 FOR IP): Performed by: INTERNAL MEDICINE

## 2020-07-26 PROCEDURE — 6370000000 HC RX 637 (ALT 250 FOR IP): Performed by: SURGERY

## 2020-07-26 PROCEDURE — 36415 COLL VENOUS BLD VENIPUNCTURE: CPT

## 2020-07-26 PROCEDURE — 80053 COMPREHEN METABOLIC PANEL: CPT

## 2020-07-26 PROCEDURE — 2580000003 HC RX 258: Performed by: SURGERY

## 2020-07-26 PROCEDURE — U0002 COVID-19 LAB TEST NON-CDC: HCPCS

## 2020-07-26 PROCEDURE — 97116 GAIT TRAINING THERAPY: CPT

## 2020-07-26 RX ORDER — METOPROLOL SUCCINATE 25 MG/1
25 TABLET, EXTENDED RELEASE ORAL DAILY
Qty: 90 TABLET | Refills: 1 | Status: ON HOLD | OUTPATIENT
Start: 2020-07-26 | End: 2022-01-01 | Stop reason: HOSPADM

## 2020-07-26 RX ORDER — FERROUS SULFATE 325(65) MG
325 TABLET ORAL 2 TIMES DAILY
Qty: 180 TABLET | Refills: 1 | Status: SHIPPED | OUTPATIENT
Start: 2020-07-26 | End: 2021-01-01 | Stop reason: ALTCHOICE

## 2020-07-26 RX ORDER — FERROUS SULFATE 325(65) MG
325 TABLET ORAL 2 TIMES DAILY WITH MEALS
Status: DISCONTINUED | OUTPATIENT
Start: 2020-07-26 | End: 2020-07-26 | Stop reason: HOSPADM

## 2020-07-26 RX ORDER — ACETAMINOPHEN 500 MG
500 TABLET ORAL 2 TIMES DAILY PRN
Qty: 120 TABLET | Refills: 3 | Status: SHIPPED | OUTPATIENT
Start: 2020-07-26

## 2020-07-26 RX ORDER — PANTOPRAZOLE SODIUM 40 MG/1
40 TABLET, DELAYED RELEASE ORAL
Qty: 30 TABLET | Refills: 3 | Status: SHIPPED | OUTPATIENT
Start: 2020-07-27

## 2020-07-26 RX ORDER — LOSARTAN POTASSIUM 50 MG/1
50 TABLET ORAL DAILY
Qty: 30 TABLET | Refills: 3 | Status: ON HOLD | OUTPATIENT
Start: 2020-07-26 | End: 2022-01-01 | Stop reason: HOSPADM

## 2020-07-26 RX ADMIN — POLYETHYLENE GLYCOL 3350 17 G: 17 POWDER, FOR SOLUTION ORAL at 08:05

## 2020-07-26 RX ADMIN — MELATONIN 1000 UNITS: at 08:09

## 2020-07-26 RX ADMIN — TRAMADOL HYDROCHLORIDE 50 MG: 50 TABLET, FILM COATED ORAL at 13:36

## 2020-07-26 RX ADMIN — GABAPENTIN 100 MG: 100 CAPSULE ORAL at 13:37

## 2020-07-26 RX ADMIN — METOPROLOL TARTRATE 50 MG: 50 TABLET, FILM COATED ORAL at 08:05

## 2020-07-26 RX ADMIN — FERROUS SULFATE TAB 325 MG (65 MG ELEMENTAL FE) 325 MG: 325 (65 FE) TAB at 15:24

## 2020-07-26 RX ADMIN — PANTOPRAZOLE SODIUM 40 MG: 40 TABLET, DELAYED RELEASE ORAL at 07:04

## 2020-07-26 RX ADMIN — LEVOTHYROXINE SODIUM 75 MCG: 75 TABLET ORAL at 07:04

## 2020-07-26 RX ADMIN — PREDNISONE 2.5 MG: 5 TABLET ORAL at 08:00

## 2020-07-26 RX ADMIN — Medication 10 ML: at 08:11

## 2020-07-26 RX ADMIN — GABAPENTIN 100 MG: 100 CAPSULE ORAL at 08:00

## 2020-07-26 RX ADMIN — HYDROXYCHLOROQUINE SULFATE 200 MG: 200 TABLET, FILM COATED ORAL at 08:03

## 2020-07-26 ASSESSMENT — PAIN SCALES - GENERAL
PAINLEVEL_OUTOF10: 5
PAINLEVEL_OUTOF10: 0
PAINLEVEL_OUTOF10: 0
PAINLEVEL_OUTOF10: 3

## 2020-07-26 NOTE — CARE COORDINATION
7-26-Cm note:  [facility] to transport pt via [facility]  to [address] [place] [address] at [place]. Pt's son David Sevilla is aware of dc time.   Electronically signed by Pradeep Damian RN on 7/26/2020 at 1:29 PM

## 2020-07-26 NOTE — PROGRESS NOTES
Physical Therapy Treatment Note    Room #:  2729/0109-19  Patient Name: Chanel Suárez  YOB: 1929  MRN: [id number]    Referring Provider: Dr. Lara Del Toro  Date of Service: 7/26/2020  Evaluating Physical Therapist: Louise Gray PT, DPT   [id number]  Diagnosis: Anemia [D64.9]      Patient Active Problem List   Diagnosis    [facility] right foot [facility]    Multiple rib fractures    RA [facility]rheumatoid arthritis) [facility]    Closed fracture of nasal bone    Closed fracture of right hand    Closed fracture of maxillary sinus ([facility]    Anemia    Essential hypertension    Gastric ulcer     Tentative placement recommendation: [facility] vs Home with HHPT  Equipment recommendation: None     Prior Level of Function: Patient ambulated with [employer] PTA. Rehab Potential: Fair+ for baseline    Past medical history:   Past Medical History:   Diagnosis Date    Arthritis     History of blood transfusion     Hyperlipidemia     Hypertension     Rheumatoid arthritis(714.0)     Thyroid disease      Past Surgical History:   Procedure Laterality Date    CARPAL TUNNEL RELEASE      right    CHOLECYSTECTOMY      ELBOW SURGERY Left 1998    INGUINAL HERNIA REPAIR Right     JOINT REPLACEMENT      Lt. Elbow,Lt. Knee    KNEE SURGERY Left 1990    UPPER GASTROINTESTINAL ENDOSCOPY N/A 7/22/2020    EGD BIOPSY performed by Lavelle Sr MD at [facility] ENDOSCOPY     Precautions: Multiple Falls, alarm    SUBJECTIVE:  Social history: Patient lives alone but has children local who alternate spending the night with her in a 1 story house with 5 steps to enter with 1 hand rail(s).   Equipment owned: [employer], [employer], raised toilet seat    [address]   How much difficulty turning over in bed?: A Little  How much difficulty sitting down on / standing up from a chair with arms?: A Little  How much difficulty moving from lying on back to sitting on side of bed?: A Little  How much help from another person moving to safety and independence with bed mobility, balance, functional transfers, and functional mobility. Patient was explained the the benefits of mobility and risks of immobility. Patient response to education:   Pt verbalized understanding Pt demonstrated skill Pt requires further education in this area    Yes Yes Yes      Treatment: Patient practiced and was instructed in the following treatment:      Patient transferred to side of bed and sat up x 5 minutes to increase dynamic sitting balance and activity tolerance. Patient stood from bed and ambulated in hallway. Patient returned to room and performed below exercises in chair. Therapeutic Exercises: Patient performed AROM ankle pumps, long arc quad and seated marching x 10 reps. Verbal cues were given for correct technique and to perform 2-3x daily. At end of session, patient was in chair with alarm activated, call light and phone within reach, all lines were intact, and nursing was notified. Daughter was present. ASSESSMENT:  Patient was able to progress ambulation distance with no loss of balance noted. Pt was motivated to participate as able but pain and fatigue limited activity tolerance. Patient would benefit from continued skilled Physical Therapy to improve functional independence and quality of life. PLAN:    Patient is making good progress towards established goals, will continue with current plan of care.       Time in: 2:29  Time out: 2:46    Total Treatment Time: 17 minutes    CPT codes:  Gait Training (25542) 17 minutes 1 unit(s)    Eleonora Gorman PTA   LIC# [id number]

## 2020-08-07 NOTE — DISCHARGE SUMMARY
Patient was discharged to a nursing home in a stable condition on medications listed above to be followed up as an outpatient after discharge from the nursing home by General Roddy MD.  Please call for an appointment one week after discharge.

## 2020-10-09 ENCOUNTER — APPOINTMENT (OUTPATIENT)
Dept: GENERAL RADIOLOGY | Age: 85
End: 2020-10-09
Payer: MEDICARE

## 2020-10-09 ENCOUNTER — HOSPITAL ENCOUNTER (EMERGENCY)
Age: 85
Discharge: HOME | End: 2020-10-09
Attending: EMERGENCY MEDICINE
Payer: MEDICARE

## 2020-10-09 VITALS
SYSTOLIC BLOOD PRESSURE: 173 MMHG | DIASTOLIC BLOOD PRESSURE: 90 MMHG | OXYGEN SATURATION: 96 % | WEIGHT: 108 LBS | HEART RATE: 79 BPM | TEMPERATURE: 97 F | BODY MASS INDEX: 21.81 KG/M2 | RESPIRATION RATE: 22 BRPM

## 2020-10-09 LAB
ANION GAP SERPL CALCULATED.3IONS-SCNC: 15 MMOL/L (ref 7–16)
BASOPHILS ABSOLUTE: 0.03 E9/L (ref 0–0.2)
BASOPHILS RELATIVE PERCENT: 0.3 % (ref 0–2)
BUN BLDV-MCNC: 18 MG/DL (ref 8–23)
CALCIUM SERPL-MCNC: 9.5 MG/DL (ref 8.6–10.2)
CHLORIDE BLD-SCNC: 102 MMOL/L (ref 98–107)
CO2: 22 MMOL/L (ref 22–29)
CREAT SERPL-MCNC: 1.1 MG/DL (ref 0.5–1)
EOSINOPHILS ABSOLUTE: 0.02 E9/L (ref 0.05–0.5)
EOSINOPHILS RELATIVE PERCENT: 0.2 % (ref 0–6)
GFR AFRICAN AMERICAN: 56
GFR NON-AFRICAN AMERICAN: 47 ML/MIN/1.73
GLUCOSE BLD-MCNC: 98 MG/DL (ref 74–99)
HCT VFR BLD CALC: 40.2 % (ref 34–48)
HEMOGLOBIN: 12.9 G/DL (ref 11.5–15.5)
IMMATURE GRANULOCYTES #: 0.03 E9/L
IMMATURE GRANULOCYTES %: 0.3 % (ref 0–5)
LYMPHOCYTES ABSOLUTE: 1.03 E9/L (ref 1.5–4)
LYMPHOCYTES RELATIVE PERCENT: 10.9 % (ref 20–42)
MCH RBC QN AUTO: 30.8 PG (ref 26–35)
MCHC RBC AUTO-ENTMCNC: 32.1 % (ref 32–34.5)
MCV RBC AUTO: 95.9 FL (ref 80–99.9)
MONOCYTES ABSOLUTE: 0.78 E9/L (ref 0.1–0.95)
MONOCYTES RELATIVE PERCENT: 8.2 % (ref 2–12)
NEUTROPHILS ABSOLUTE: 7.58 E9/L (ref 1.8–7.3)
NEUTROPHILS RELATIVE PERCENT: 80.1 % (ref 43–80)
PDW BLD-RTO: 13.5 FL (ref 11.5–15)
PLATELET # BLD: 277 E9/L (ref 130–450)
PMV BLD AUTO: 8.7 FL (ref 7–12)
POTASSIUM SERPL-SCNC: 4.3 MMOL/L (ref 3.5–5)
RBC # BLD: 4.19 E12/L (ref 3.5–5.5)
SODIUM BLD-SCNC: 139 MMOL/L (ref 132–146)
TROPONIN: <0.01 NG/ML (ref 0–0.03)
WBC # BLD: 9.5 E9/L (ref 4.5–11.5)

## 2020-10-09 PROCEDURE — 6360000002 HC RX W HCPCS: Performed by: EMERGENCY MEDICINE

## 2020-10-09 PROCEDURE — 71045 X-RAY EXAM CHEST 1 VIEW: CPT

## 2020-10-09 PROCEDURE — 96374 THER/PROPH/DIAG INJ IV PUSH: CPT

## 2020-10-09 PROCEDURE — 96376 TX/PRO/DX INJ SAME DRUG ADON: CPT

## 2020-10-09 PROCEDURE — 80048 BASIC METABOLIC PNL TOTAL CA: CPT

## 2020-10-09 PROCEDURE — 99284 EMERGENCY DEPT VISIT MOD MDM: CPT

## 2020-10-09 PROCEDURE — 99285 EMERGENCY DEPT VISIT HI MDM: CPT

## 2020-10-09 PROCEDURE — 93005 ELECTROCARDIOGRAM TRACING: CPT | Performed by: EMERGENCY MEDICINE

## 2020-10-09 PROCEDURE — 84484 ASSAY OF TROPONIN QUANT: CPT

## 2020-10-09 PROCEDURE — 85025 COMPLETE CBC W/AUTO DIFF WBC: CPT

## 2020-10-09 RX ORDER — HYDRALAZINE HYDROCHLORIDE 20 MG/ML
5 INJECTION INTRAMUSCULAR; INTRAVENOUS ONCE
Status: COMPLETED | OUTPATIENT
Start: 2020-10-09 | End: 2020-10-09

## 2020-10-09 RX ADMIN — HYDRALAZINE HYDROCHLORIDE 5 MG: 20 INJECTION INTRAMUSCULAR; INTRAVENOUS at 21:48

## 2020-10-09 RX ADMIN — HYDRALAZINE HYDROCHLORIDE 5 MG: 20 INJECTION INTRAMUSCULAR; INTRAVENOUS at 20:36

## 2020-10-09 ASSESSMENT — ENCOUNTER SYMPTOMS
NAUSEA: 0
VOMITING: 0
SHORTNESS OF BREATH: 0
CONSTIPATION: 0
SORE THROAT: 0
SINUS PRESSURE: 0
BACK PAIN: 0
PHOTOPHOBIA: 0
DIARRHEA: 0
SINUS PAIN: 0
RHINORRHEA: 0
WHEEZING: 0
ABDOMINAL PAIN: 0
COUGH: 0

## 2020-10-09 NOTE — ED NOTES
Bed: 09  Expected date:   Expected time:   Means of arrival:   Comments:  Kingsley Coats RN  10/09/20 1954

## 2020-10-10 LAB
EKG ATRIAL RATE: 66 BPM
EKG P AXIS: 74 DEGREES
EKG P-R INTERVAL: 148 MS
EKG Q-T INTERVAL: 382 MS
EKG QRS DURATION: 72 MS
EKG QTC CALCULATION (BAZETT): 400 MS
EKG R AXIS: 54 DEGREES
EKG T AXIS: 56 DEGREES
EKG VENTRICULAR RATE: 66 BPM

## 2020-10-10 PROCEDURE — 93010 ELECTROCARDIOGRAM REPORT: CPT | Performed by: INTERNAL MEDICINE

## 2020-10-10 NOTE — ED PROVIDER NOTES
Patient is a 19-year-old female who presents with a chief complaint of hypertension. Patient states that she otherwise feels fine she has no complaints. Her son comes to her house every couple of days and will take her blood pressure. It was noted to be elevated. Patient states that she denies any recent trauma or falls. Denies any headache, lightheadedness, dizziness, blurred vision, chest pain, shortness of breath, abdominal, nausea, vomiting. Patient denies any recent changes in medications. She said that she is on metoprolol and losartan. The history is provided by the patient. No  was used. Review of Systems   Constitutional: Negative for chills, fatigue and fever. HENT: Negative for congestion, rhinorrhea, sinus pressure, sinus pain, sneezing and sore throat. Eyes: Negative for photophobia and visual disturbance. Respiratory: Negative for cough, shortness of breath and wheezing. Cardiovascular: Negative for chest pain and palpitations. Gastrointestinal: Negative for abdominal pain, constipation, diarrhea, nausea and vomiting. Genitourinary: Negative for dysuria, frequency and hematuria. Musculoskeletal: Negative for back pain, neck pain and neck stiffness. Skin: Negative for rash and wound. Neurological: Negative for dizziness, light-headedness and headaches. Psychiatric/Behavioral: Negative for agitation, behavioral problems and confusion. Physical Exam  Constitutional:       General: She is not in acute distress. Appearance: She is not toxic-appearing. HENT:      Head: Normocephalic. Comments: Normal TMs bilaterally. Mouth/Throat:      Mouth: Mucous membranes are moist.   Eyes:      General: No scleral icterus. Pupils: Pupils are equal, round, and reactive to light. Comments: Pupils are equal reactive bilaterally. Neck:      Musculoskeletal: Normal range of motion. No neck rigidity.    Cardiovascular:      Rate and worsening symptoms and she will return. No further questions. ED Course as of Oct 09 2241   Fri Oct 09, 2020   2240 Patient is resting comfortably in bed. Repeat blood pressure is 173/90. Patient will be discharged home at this time. The patient states that she was taking losartan 50 mg. The patient was advised by her PCP that if her blood pressure was below 385 systolic then to only take a half of a pill which is 50 mg and if it is higher than that then to take a full pill. Patient has only been taking 1/2 pill. Patient does understand that she needs to increase the dose if her blood pressures over 776 systolic. She is not complain of any symptoms at this time. She will return she is worsening symptoms. [MS]      ED Course User Index  [MS] Alex De La Cruz DO           ED Course as of Oct 09 2241   Fri Oct 09, 2020   2240 Patient is resting comfortably in bed. Repeat blood pressure is 173/90. Patient will be discharged home at this time. The patient states that she was taking losartan 50 mg. The patient was advised by her PCP that if her blood pressure was below 536 systolic then to only take a half of a pill which is 50 mg and if it is higher than that then to take a full pill. Patient has only been taking 1/2 pill. Patient does understand that she needs to increase the dose if her blood pressures over 581 systolic. She is not complain of any symptoms at this time. She will return she is worsening symptoms. [MS]      ED Course User Index  [MS] Alex De La Cruz, DO       --------------------------------------------- PAST HISTORY ---------------------------------------------  Past Medical History:  has a past medical history of Arthritis, History of blood transfusion, Hyperlipidemia, Hypertension, Rheumatoid arthritis(714.0), and Thyroid disease. Past Surgical History:  has a past surgical history that includes Carpal tunnel release; Cholecystectomy; joint replacement;  Inguinal hernia repair (Right); knee surgery (Left, 1990); Elbow surgery (Left, 1998); and Upper gastrointestinal endoscopy (N/A, 7/22/2020). Social History:  reports that she has never smoked. She has never used smokeless tobacco. She reports that she does not drink alcohol or use drugs. Family History: family history includes Heart Disease in her brother. The patients home medications have been reviewed. Allergies: Levofloxacin; Bactrim [sulfamethoxazole-trimethoprim]; Erythromycin; Amoxicillin; Celebrex [celecoxib]; Clinoril [sulindac];  Codeine; and Morphine    -------------------------------------------------- RESULTS -------------------------------------------------  Labs:  Results for orders placed or performed during the hospital encounter of 10/09/20   CBC Auto Differential   Result Value Ref Range    WBC 9.5 4.5 - 11.5 E9/L    RBC 4.19 3.50 - 5.50 E12/L    Hemoglobin 12.9 11.5 - 15.5 g/dL    Hematocrit 40.2 34.0 - 48.0 %    MCV 95.9 80.0 - 99.9 fL    MCH 30.8 26.0 - 35.0 pg    MCHC 32.1 32.0 - 34.5 %    RDW 13.5 11.5 - 15.0 fL    Platelets 876 996 - 697 E9/L    MPV 8.7 7.0 - 12.0 fL    Neutrophils % 80.1 (H) 43.0 - 80.0 %    Immature Granulocytes % 0.3 0.0 - 5.0 %    Lymphocytes % 10.9 (L) 20.0 - 42.0 %    Monocytes % 8.2 2.0 - 12.0 %    Eosinophils % 0.2 0.0 - 6.0 %    Basophils % 0.3 0.0 - 2.0 %    Neutrophils Absolute 7.58 (H) 1.80 - 7.30 E9/L    Immature Granulocytes # 0.03 E9/L    Lymphocytes Absolute 1.03 (L) 1.50 - 4.00 E9/L    Monocytes Absolute 0.78 0.10 - 0.95 E9/L    Eosinophils Absolute 0.02 (L) 0.05 - 0.50 E9/L    Basophils Absolute 0.03 0.00 - 0.20 Q1/B   Basic metabolic panel   Result Value Ref Range    Sodium 139 132 - 146 mmol/L    Potassium 4.3 3.5 - 5.0 mmol/L    Chloride 102 98 - 107 mmol/L    CO2 22 22 - 29 mmol/L    Anion Gap 15 7 - 16 mmol/L    Glucose 98 74 - 99 mg/dL    BUN 18 8 - 23 mg/dL    CREATININE 1.1 (H) 0.5 - 1.0 mg/dL    GFR Non-African American 47 >=60 mL/min/1.73    GFR

## 2020-10-10 NOTE — ED NOTES
Patient lives alone, independent ADL's. She uses a walker typically to ambulate. Family live in close proximity  of her to help. States she was not feeling well, \"alittle tired, not herself\". Family had checked her BP she stated a couple of times and it was what they thought \"high\". She took her prescribed medications today. Denies SOB, chest pain, fever, chills, or cough.       Dorina Paredes RN  10/09/20 2008

## 2021-01-01 ENCOUNTER — APPOINTMENT (OUTPATIENT)
Dept: CT IMAGING | Age: 86
End: 2021-01-01
Payer: MEDICARE

## 2021-01-01 ENCOUNTER — HOSPITAL ENCOUNTER (EMERGENCY)
Age: 86
Discharge: HOME | End: 2021-12-31
Attending: EMERGENCY MEDICINE
Payer: MEDICARE

## 2021-01-01 ENCOUNTER — HOSPITAL ENCOUNTER (EMERGENCY)
Age: 86
Discharge: HOME | End: 2021-02-20
Payer: MEDICARE

## 2021-01-01 ENCOUNTER — APPOINTMENT (OUTPATIENT)
Dept: GENERAL RADIOLOGY | Age: 86
End: 2021-01-01
Payer: MEDICARE

## 2021-01-01 ENCOUNTER — HOSPITAL ENCOUNTER (EMERGENCY)
Age: 86
Discharge: NURSING, REHAB OR LONG-TERM CARE | End: 2021-11-20
Attending: EMERGENCY MEDICINE
Payer: MEDICARE

## 2021-01-01 VITALS
DIASTOLIC BLOOD PRESSURE: 75 MMHG | OXYGEN SATURATION: 95 % | TEMPERATURE: 97.5 F | SYSTOLIC BLOOD PRESSURE: 138 MMHG | HEART RATE: 73 BPM | BODY MASS INDEX: 21.21 KG/M2 | WEIGHT: 105 LBS | RESPIRATION RATE: 20 BRPM

## 2021-01-01 VITALS
SYSTOLIC BLOOD PRESSURE: 130 MMHG | WEIGHT: 112 LBS | HEART RATE: 75 BPM | DIASTOLIC BLOOD PRESSURE: 84 MMHG | TEMPERATURE: 98.9 F | BODY MASS INDEX: 21.99 KG/M2 | OXYGEN SATURATION: 91 % | HEIGHT: 60 IN | RESPIRATION RATE: 18 BRPM

## 2021-01-01 VITALS
TEMPERATURE: 98.2 F | DIASTOLIC BLOOD PRESSURE: 80 MMHG | OXYGEN SATURATION: 93 % | HEART RATE: 72 BPM | RESPIRATION RATE: 18 BRPM | SYSTOLIC BLOOD PRESSURE: 141 MMHG

## 2021-01-01 DIAGNOSIS — S81.812A SKIN TEAR OF LEFT LOWER LEG WITHOUT COMPLICATION, INITIAL ENCOUNTER: Primary | ICD-10-CM

## 2021-01-01 DIAGNOSIS — M25.471 ANKLE EDEMA, BILATERAL: ICD-10-CM

## 2021-01-01 DIAGNOSIS — R55 SYNCOPE AND COLLAPSE: Primary | ICD-10-CM

## 2021-01-01 DIAGNOSIS — I10 HYPERTENSION, UNSPECIFIED TYPE: Primary | ICD-10-CM

## 2021-01-01 DIAGNOSIS — M25.472 ANKLE EDEMA, BILATERAL: ICD-10-CM

## 2021-01-01 DIAGNOSIS — R10.32 ABDOMINAL PAIN, LEFT LOWER QUADRANT: ICD-10-CM

## 2021-01-01 LAB
ALBUMIN SERPL-MCNC: 3.9 G/DL (ref 3.5–5.2)
ALBUMIN SERPL-MCNC: 4.2 G/DL (ref 3.5–5.2)
ALP BLD-CCNC: 107 U/L (ref 35–104)
ALP BLD-CCNC: 63 U/L (ref 35–104)
ALT SERPL-CCNC: 13 U/L (ref 0–32)
ALT SERPL-CCNC: 9 U/L (ref 0–32)
ANION GAP SERPL CALCULATED.3IONS-SCNC: 12 MMOL/L (ref 7–16)
ANION GAP SERPL CALCULATED.3IONS-SCNC: 14 MMOL/L (ref 7–16)
APTT: 22.5 SEC (ref 24.5–35.1)
AST SERPL-CCNC: 20 U/L (ref 0–31)
AST SERPL-CCNC: 24 U/L (ref 0–31)
BACTERIA: ABNORMAL /HPF
BASOPHILS ABSOLUTE: 0 E9/L (ref 0–0.2)
BASOPHILS ABSOLUTE: 0.05 E9/L (ref 0–0.2)
BASOPHILS RELATIVE PERCENT: 0 % (ref 0–2)
BASOPHILS RELATIVE PERCENT: 0.5 % (ref 0–2)
BILIRUB SERPL-MCNC: 0.5 MG/DL (ref 0–1.2)
BILIRUB SERPL-MCNC: 0.5 MG/DL (ref 0–1.2)
BILIRUBIN URINE: NEGATIVE
BILIRUBIN URINE: NEGATIVE
BLOOD, URINE: ABNORMAL
BLOOD, URINE: NEGATIVE
BUN BLDV-MCNC: 17 MG/DL (ref 6–23)
BUN BLDV-MCNC: 39 MG/DL (ref 6–23)
CALCIUM SERPL-MCNC: 10 MG/DL (ref 8.6–10.2)
CALCIUM SERPL-MCNC: 9.2 MG/DL (ref 8.6–10.2)
CHLORIDE BLD-SCNC: 101 MMOL/L (ref 98–107)
CHLORIDE BLD-SCNC: 95 MMOL/L (ref 98–107)
CHOLESTEROL, TOTAL: 260 MG/DL (ref 0–199)
CLARITY: CLEAR
CLARITY: CLEAR
CO2: 23 MMOL/L (ref 22–29)
CO2: 31 MMOL/L (ref 22–29)
COLOR: YELLOW
COLOR: YELLOW
CREAT SERPL-MCNC: 1.2 MG/DL (ref 0.5–1)
CREAT SERPL-MCNC: 1.3 MG/DL (ref 0.5–1)
EKG ATRIAL RATE: 81 BPM
EKG P AXIS: 92 DEGREES
EKG P-R INTERVAL: 138 MS
EKG Q-T INTERVAL: 376 MS
EKG QRS DURATION: 72 MS
EKG QTC CALCULATION (BAZETT): 436 MS
EKG R AXIS: 12 DEGREES
EKG T AXIS: 73 DEGREES
EKG VENTRICULAR RATE: 81 BPM
EOSINOPHILS ABSOLUTE: 0 E9/L (ref 0.05–0.5)
EOSINOPHILS ABSOLUTE: 0.19 E9/L (ref 0.05–0.5)
EOSINOPHILS RELATIVE PERCENT: 0 % (ref 0–6)
EOSINOPHILS RELATIVE PERCENT: 1.9 % (ref 0–6)
EPITHELIAL CELLS, UA: ABNORMAL /HPF
GFR AFRICAN AMERICAN: 46
GFR AFRICAN AMERICAN: 51
GFR NON-AFRICAN AMERICAN: 38 ML/MIN/1.73
GFR NON-AFRICAN AMERICAN: 42 ML/MIN/1.73
GLUCOSE BLD-MCNC: 76 MG/DL (ref 74–99)
GLUCOSE BLD-MCNC: 89 MG/DL (ref 74–99)
GLUCOSE URINE: NEGATIVE MG/DL
GLUCOSE URINE: NEGATIVE MG/DL
HCT VFR BLD CALC: 39.7 % (ref 34–48)
HCT VFR BLD CALC: 42.3 % (ref 34–48)
HDLC SERPL-MCNC: 81 MG/DL
HEMOGLOBIN: 12.6 G/DL (ref 11.5–15.5)
HEMOGLOBIN: 12.7 G/DL (ref 11.5–15.5)
IMMATURE GRANULOCYTES #: 0.1 E9/L
IMMATURE GRANULOCYTES %: 1 % (ref 0–5)
INR BLD: 1
KETONES, URINE: NEGATIVE MG/DL
KETONES, URINE: NEGATIVE MG/DL
LDL CHOLESTEROL CALCULATED: 140 MG/DL (ref 0–99)
LEUKOCYTE ESTERASE, URINE: NEGATIVE
LEUKOCYTE ESTERASE, URINE: NEGATIVE
LYMPHOCYTES ABSOLUTE: 0.82 E9/L (ref 1.5–4)
LYMPHOCYTES ABSOLUTE: 2.29 E9/L (ref 1.5–4)
LYMPHOCYTES RELATIVE PERCENT: 23 % (ref 20–42)
LYMPHOCYTES RELATIVE PERCENT: 5 % (ref 20–42)
MAGNESIUM: 2.1 MG/DL (ref 1.6–2.6)
MCH RBC QN AUTO: 30.4 PG (ref 26–35)
MCH RBC QN AUTO: 32.3 PG (ref 26–35)
MCHC RBC AUTO-ENTMCNC: 30 % (ref 32–34.5)
MCHC RBC AUTO-ENTMCNC: 31.7 % (ref 32–34.5)
MCV RBC AUTO: 101.2 FL (ref 80–99.9)
MCV RBC AUTO: 101.8 FL (ref 80–99.9)
MONOCYTES ABSOLUTE: 1.07 E9/L (ref 0.1–0.95)
MONOCYTES ABSOLUTE: 1.14 E9/L (ref 0.1–0.95)
MONOCYTES RELATIVE PERCENT: 10.8 % (ref 2–12)
MONOCYTES RELATIVE PERCENT: 7 % (ref 2–12)
NEUTROPHILS ABSOLUTE: 14.34 E9/L (ref 1.8–7.3)
NEUTROPHILS ABSOLUTE: 6.24 E9/L (ref 1.8–7.3)
NEUTROPHILS RELATIVE PERCENT: 62.8 % (ref 43–80)
NEUTROPHILS RELATIVE PERCENT: 88 % (ref 43–80)
NITRITE, URINE: NEGATIVE
NITRITE, URINE: NEGATIVE
PDW BLD-RTO: 13.5 FL (ref 11.5–15)
PDW BLD-RTO: 14.4 FL (ref 11.5–15)
PH UA: 7 (ref 5–9)
PH UA: 7.5 (ref 5–9)
PLATELET # BLD: 325 E9/L (ref 130–450)
PLATELET # BLD: 372 E9/L (ref 130–450)
PMV BLD AUTO: 8.5 FL (ref 7–12)
PMV BLD AUTO: 9.3 FL (ref 7–12)
POTASSIUM SERPL-SCNC: 4 MMOL/L (ref 3.5–5)
POTASSIUM SERPL-SCNC: 4.5 MMOL/L (ref 3.5–5)
PROTEIN UA: NEGATIVE MG/DL
PROTEIN UA: NEGATIVE MG/DL
PROTHROMBIN TIME: 11.6 SEC (ref 9.3–12.4)
RBC # BLD: 3.9 E12/L (ref 3.5–5.5)
RBC # BLD: 4.18 E12/L (ref 3.5–5.5)
RBC # BLD: NORMAL 10*6/UL
RBC UA: ABNORMAL /HPF (ref 0–2)
SODIUM BLD-SCNC: 132 MMOL/L (ref 132–146)
SODIUM BLD-SCNC: 144 MMOL/L (ref 132–146)
SPECIFIC GRAVITY UA: 1.01 (ref 1–1.03)
SPECIFIC GRAVITY UA: 1.01 (ref 1–1.03)
T4 FREE: 1.55 NG/DL (ref 0.93–1.7)
TOTAL PROTEIN: 6.5 G/DL (ref 6.4–8.3)
TOTAL PROTEIN: 6.8 G/DL (ref 6.4–8.3)
TRIGL SERPL-MCNC: 194 MG/DL (ref 0–149)
TROPONIN, HIGH SENSITIVITY: 49 NG/L (ref 0–9)
TROPONIN, HIGH SENSITIVITY: 54 NG/L (ref 0–9)
TSH SERPL DL<=0.05 MIU/L-ACNC: 0.44 UIU/ML (ref 0.27–4.2)
URINE CULTURE, ROUTINE: NORMAL
UROBILINOGEN, URINE: 0.2 E.U./DL
UROBILINOGEN, URINE: 0.2 E.U./DL
VLDLC SERPL CALC-MCNC: 39 MG/DL
WBC # BLD: 16.3 E9/L (ref 4.5–11.5)
WBC # BLD: 9.9 E9/L (ref 4.5–11.5)
WBC UA: ABNORMAL /HPF (ref 0–5)

## 2021-01-01 PROCEDURE — 99284 EMERGENCY DEPT VISIT MOD MDM: CPT

## 2021-01-01 PROCEDURE — 85025 COMPLETE CBC W/AUTO DIFF WBC: CPT

## 2021-01-01 PROCEDURE — 99285 EMERGENCY DEPT VISIT HI MDM: CPT

## 2021-01-01 PROCEDURE — 83735 ASSAY OF MAGNESIUM: CPT

## 2021-01-01 PROCEDURE — 81001 URINALYSIS AUTO W/SCOPE: CPT

## 2021-01-01 PROCEDURE — 84484 ASSAY OF TROPONIN QUANT: CPT

## 2021-01-01 PROCEDURE — 6370000000 HC RX 637 (ALT 250 FOR IP): Performed by: PHYSICIAN ASSISTANT

## 2021-01-01 PROCEDURE — 99211 OFF/OP EST MAY X REQ PHY/QHP: CPT

## 2021-01-01 PROCEDURE — 70450 CT HEAD/BRAIN W/O DYE: CPT

## 2021-01-01 PROCEDURE — 93005 ELECTROCARDIOGRAM TRACING: CPT | Performed by: EMERGENCY MEDICINE

## 2021-01-01 PROCEDURE — 80053 COMPREHEN METABOLIC PANEL: CPT

## 2021-01-01 PROCEDURE — 71046 X-RAY EXAM CHEST 2 VIEWS: CPT

## 2021-01-01 PROCEDURE — 85730 THROMBOPLASTIN TIME PARTIAL: CPT

## 2021-01-01 PROCEDURE — 85610 PROTHROMBIN TIME: CPT

## 2021-01-01 RX ORDER — ZOLEDRONIC ACID 5 MG/100ML
5 INJECTION, SOLUTION INTRAVENOUS ONCE
COMMUNITY

## 2021-01-01 RX ORDER — CLOTRIMAZOLE 10 MG/1
10 LOZENGE ORAL; TOPICAL EVERY 4 HOURS
COMMUNITY

## 2021-01-01 RX ORDER — NITROFURANTOIN MACROCRYSTALS 100 MG/1
100 CAPSULE ORAL 2 TIMES DAILY
Status: ON HOLD | COMMUNITY
End: 2022-01-01 | Stop reason: HOSPADM

## 2021-01-01 RX ORDER — FUROSEMIDE 20 MG/1
20 TABLET ORAL DAILY
Status: ON HOLD | COMMUNITY
End: 2022-01-01 | Stop reason: HOSPADM

## 2021-01-01 RX ORDER — IPRATROPIUM BROMIDE AND ALBUTEROL SULFATE 2.5; .5 MG/3ML; MG/3ML
2 SOLUTION RESPIRATORY (INHALATION) ONCE
Status: DISCONTINUED | OUTPATIENT
Start: 2021-01-01 | End: 2021-01-01 | Stop reason: HOSPADM

## 2021-01-01 RX ORDER — DIAPER,BRIEF,INFANT-TODD,DISP
EACH MISCELLANEOUS ONCE
Status: COMPLETED | OUTPATIENT
Start: 2021-01-01 | End: 2021-01-01

## 2021-01-01 RX ORDER — HYDROXYCHLOROQUINE SULFATE 200 MG/1
200 TABLET, FILM COATED ORAL DAILY
COMMUNITY

## 2021-01-01 RX ORDER — ALBUTEROL SULFATE 90 UG/1
2 AEROSOL, METERED RESPIRATORY (INHALATION) EVERY 6 HOURS PRN
Qty: 18 G | Refills: 0 | Status: SHIPPED | OUTPATIENT
Start: 2021-01-01 | End: 2022-01-01

## 2021-01-01 RX ADMIN — BACITRACIN: 500 OINTMENT TOPICAL at 15:10

## 2021-01-01 ASSESSMENT — ENCOUNTER SYMPTOMS
WHEEZING: 0
SHORTNESS OF BREATH: 0
DIARRHEA: 0
NAUSEA: 0
ABDOMINAL DISTENTION: 0
BACK PAIN: 0
ABDOMINAL PAIN: 0
VOMITING: 0
COUGH: 0

## 2021-01-01 ASSESSMENT — PAIN SCALES - GENERAL: PAINLEVEL_OUTOF10: 5

## 2021-01-01 ASSESSMENT — PAIN DESCRIPTION - ONSET: ONSET: GRADUAL

## 2021-01-01 ASSESSMENT — PAIN DESCRIPTION - DESCRIPTORS: DESCRIPTORS: ACHING

## 2021-01-01 ASSESSMENT — PAIN DESCRIPTION - PROGRESSION: CLINICAL_PROGRESSION: NOT CHANGED

## 2021-01-01 ASSESSMENT — PAIN DESCRIPTION - ORIENTATION: ORIENTATION: LEFT

## 2021-01-01 ASSESSMENT — PAIN DESCRIPTION - PAIN TYPE: TYPE: ACUTE PAIN

## 2021-02-20 NOTE — ED PROVIDER NOTES
[address]  Department of Emergency Medicine   ED  Encounter Note  Admit Date/RoomTime: 2021  2:03 PM  ED Room:   NAME: Missy Brito  : 1929  MRN: [id number]     Chief Complaint:  Wound Check (States she scrapped her left lower leg about a month ago. It has not healed and is having clear drainage.) and Abdominal Pain (Having left side abdominal pain off and on for several months.)    HISTORY OF PRESENT ILLNESS        Missy Brito is a 80 y.o. female who presents to the ED with 2 separate complaints. 1 month ago patient sustained a skin tear to her left lower leg during a bed transfer. She states the wound is taking forever to heal.  She still has an open sore area to the left lower leg. Now it is draining clear fluid. Patient does note that she does have bilateral ankle swelling. Did recently see PCP who put her on a diuretic for couple days. She feels that did not help. At home she has just been washing it with regular water. Not really applying any dressings. Patient also complains of left-sided abdominal pain that has been off and on for years. She does know she has a left side ovarian cyst.  Last checked 18 months ago. In addition she has diverticulosis. Has not recently had any flareups or been on antibiotics. Was recently diagnosed with a urinary tract infection and took nitrofurantoin. At this time she is not having any abdominal pain. No fevers or chills. No nausea or vomiting. Deals with constipation regularly and takes MiraLAX regularly. Denies any diarrhea. Patient denies any burning with urination or frequency. ROS   Pertinent positives and negatives are stated within HPI, all other systems reviewed and are negative. Past Medical History:  has a past medical history of Arthritis, History of blood transfusion, Hyperlipidemia, Hypertension, Rheumatoid arthritis(714.0), and Thyroid disease.     Surgical History:  has a past surgical palpation. Neuro:  Alert and Oriented to person, place, time and situation. Normal LOC. Moves all extremities. Speech fluent. Psych:  Calm and Cooperative. Normal thought process. Normal judgement. Lab / Imaging Results   (All laboratory and radiology results have been personally reviewed by myself)  Labs:  No results found for this visit on 02/20/21. Imaging: All Radiology results interpreted by Radiologist unless otherwise noted. No orders to display       ED Course / Medical Decision Making   Medications - No data to display     Re-examination:  2/20/21       Time:   Patients condition . Consult(s):   None    Procedure(s):   none    MDM:   Slow healing skin tear the left lower leg. No sign of infection. Clear drainage is from the leg edema itself. Wound care discussed with patient and son at bedside. Did recommend possibility of actually following up with the wound clinic. Chronic intermittent left lower quadrant abdominal pain. Because she still has her ovaries and uterus I did recommend that she follow the ovarian cyst on a more regular 6-month basis. Recommended an ultrasound of that area that she can have ordered by her family doctor or gynecologist.  Obviously if the pain worsens she develops fevers, vomiting or diarrhea then she should go to the ER. No urinary symptoms at this time. Plan of Care/Counseling:  I reviewed today's visit with the patient in addition to providing specific details for the plan of care and counseling regarding the diagnosis and prognosis. Questions are answered at this time and are agreeable with the plan. ASSESSMENT     1. Skin tear of left lower leg without complication, initial encounter New Problem   2. Ankle edema, bilateral Stable, but not controlled   3. Abdominal pain, left lower quadrant Stable, but not controlled     PLAN   Discharge home.   Patient condition is good    New Medications     New Prescriptions    No medications on file Electronically signed by PRICILA Leonard   DD: 2/20/21  **This report was transcribed using voice recognition software. Every effort was made to ensure accuracy; however, inadvertent computerized transcription errors may be present.   END OF ED PROVIDER NOTE       Marvel Leonard  02/20/21 1500

## 2021-11-20 NOTE — ED PROVIDER NOTES
This patient is a resident of a local memory care facility. Nursing staff at our emergency department received a call from them that they were sending the patient in to be evaluated because they found her with a high blood pressure this late morning. They administered her normally ordered hypertensive medications and placed her in an ambulance for transport. They denied any complaints by the patient herself. The history is provided by the patient, the EMS personnel and the nursing home. Hypertension  Severity:  Mild  Onset quality:  Unable to specify  Timing:  Constant  Progression:  Resolved  Chronicity:  Recurrent  Relieved by:  Beta blockers  Worsened by:  Nothing  Associated symptoms: no abdominal pain, no chest pain, no dizziness, no fever, no headaches, no loss of consciousness, no nausea, no shortness of breath, not vomiting and no weakness         Review of Systems   Constitutional: Negative for chills and fever. Respiratory: Negative for cough, shortness of breath and wheezing. Cardiovascular: Negative for chest pain. Gastrointestinal: Negative for abdominal distention, abdominal pain, diarrhea, nausea and vomiting. Genitourinary: Negative for dysuria and frequency. Musculoskeletal: Negative for arthralgias and back pain. Skin: Negative for rash and wound. Neurological: Negative for dizziness, loss of consciousness, weakness and headaches. Hematological: Negative for adenopathy. All other systems reviewed and are negative. Physical Exam  Vitals and nursing note reviewed. Constitutional:       Appearance: She is well-developed. Comments: Patient is pleasant and denies any symptoms. HENT:      Head: Normocephalic and atraumatic. Eyes:      Pupils: Pupils are equal, round, and reactive to light. Cardiovascular:      Rate and Rhythm: Normal rate and regular rhythm. Heart sounds: Normal heart sounds. No murmur heard.       Pulmonary:      Effort: Pulmonary effort is normal. No respiratory distress. Breath sounds: Normal breath sounds. No wheezing or rales. Abdominal:      General: Bowel sounds are normal.      Palpations: Abdomen is soft. Tenderness: There is no abdominal tenderness. There is no guarding or rebound. Musculoskeletal:      Cervical back: Normal range of motion and neck supple. Skin:     General: Skin is warm and dry. Neurological:      Mental Status: She is alert. Cranial Nerves: No cranial nerve deficit. Coordination: Coordination normal.      Comments: Patient is pleasantly confused, her baseline.           Procedures     MDM            --------------------------------------------- PAST HISTORY ---------------------------------------------  Past Medical History:  has a past medical history of Abdominal tenderness of left lower quadrant, Angina pectoris ([address] Arthritis, Bacteremia, Bacterial cellulitis, Bite of animal, Cardiac dysrhythmia, Cellulitis and abscess of upper extremity, Cellulitis of forearm, Cellulitis of lower limb, Chest wall pain, Colitis, Cough, Degenerative joint disease involving multiple joints, Dizziness and giddiness, Edema of lower extremity, Flare of rheumatoid arthritis ([address] Frozen shoulder, GERD (gastroesophageal reflux disease), Hip pain, left, History of blood transfusion, Hyperlipidemia, Hypertension, Hypotensive syncope, Hypothyroidism, Laceration with foreign body of other part of head, initial encounter, Left flank pain, Mild dehydration, Mucous membrane inflammation, Multiple fractures of ribs of right side, Musculoskeletal pain, Open bite wound of skin, Oral candidiasis, Orthostatic hypotension, Other specified joint disorders, right ankle and foot, Ovarian mass, Peptic ulcer disease, Peripheral vertigo, Rheumatoid arthritis(714.0), Right inguinal hernia, Senile osteoporosis, Spinal stenosis, lumbar region without neurogenic claudication, Spontaneous ecchymosis, Thyroid disease, and Varicose veins of lower extremity. Past Surgical History:  has a past surgical history that includes Carpal tunnel release; Cholecystectomy; joint replacement; Inguinal hernia repair (Right); knee surgery (Left, 1990); Elbow surgery (Left, 1998); Upper gastrointestinal endoscopy (N/A, 7/22/2020); and Colonoscopy (2007). Social History:  reports that she has never smoked. She has never used smokeless tobacco. She reports that she does not drink alcohol and does not use drugs. Family History: family history includes Heart Disease in her brother. The patients home medications have been reviewed. Allergies: Levofloxacin, Bactrim [sulfamethoxazole-trimethoprim], Erythromycin, Amoxicillin, Celebrex [celecoxib], Levaquin [levofloxacin], Clinoril [sulindac], Codeine, and Morphine    -------------------------------------------------- RESULTS -------------------------------------------------  Labs:  No results found for this visit on 11/20/21. Radiology:  No orders to display       ------------------------- NURSING NOTES AND VITALS REVIEWED ---------------------------  Date / Time Roomed:  11/20/2021  1:12 PM  ED Bed Assignment:  [id number]    The nursing notes within the ED encounter and vital signs as below have been reviewed. BP (!) 141/80   Pulse 72   Temp 98.2 °F (36.8 °C)   Resp 18   SpO2 93%   Oxygen Saturation Interpretation: Normal      ------------------------------------------ PROGRESS NOTES ------------------------------------------  1:48 PM EST  I have spoken with the patient and discussed todays results, in addition to providing specific details for the plan of care and counseling regarding the diagnosis and prognosis. Their questions are answered at this time and they are agreeable with the plan. I discussed at length with them reasons for immediate return here for re evaluation.  They will followup with their primary care physician by calling their office on Monday.      --------------------------------- ADDITIONAL PROVIDER NOTES ---------------------------------  At this time the patient is without objective evidence of an acute process requiring hospitalization or inpatient management. They have remained hemodynamically stable throughout their entire ED visit and are stable for discharge with outpatient follow-up. The plan has been discussed in detail and they are aware of the specific conditions for emergent return, as well as the importance of follow-up. New Prescriptions    No medications on file       Diagnosis:  1. Hypertension, unspecified type        Disposition:  Patient's disposition: Discharge to nursing home  Patient's condition is stable.           Juhi Arrington DO  11/20/21 1340

## 2021-11-20 NOTE — ED NOTES
Bed: H4  Expected date:   Expected time:   Means of arrival:   Comments:  [address] RN  11/20/21 6731

## 2021-12-31 NOTE — ED PROVIDER NOTES
Chief complaint:  Syncope    HPI history provided by the patient and EMS and report  Patient from nursing home, assisted living apparently sent over for a syncopal event today. Very limited information other than the patient passed out, has a history of arthritis and passes out sometimes particularly when her arthritis flares up. She states that her joints all hurt and that she passed out today. She has no other complaints. Denies chest pain, palpitations or shortness of breath. Denies fevers or chills. Denies headache. Denies any acute injuries from the syncopal event. Not reported whether she actually fell or not. Denies any abdominal pain. No treatment prior to arrival.    Review of Systems   Constitutional: Negative for chills, diaphoresis, fatigue and fever. HENT: Negative for congestion and sore throat. Respiratory: Negative for cough, chest tightness, shortness of breath and wheezing. Cardiovascular: Negative for chest pain, palpitations and leg swelling. Gastrointestinal: Negative for abdominal pain, diarrhea, nausea and vomiting. Genitourinary: Negative for dysuria, flank pain and frequency. Musculoskeletal: Positive for arthralgias. Negative for back pain, joint swelling, myalgias, neck pain and neck stiffness. Skin: Negative for rash and wound. Neurological: Positive for syncope. Negative for dizziness, seizures, weakness, light-headedness and headaches. Hematological: Negative for adenopathy. All other systems reviewed and are negative. Physical Exam  Vitals and nursing note reviewed. Constitutional:       General: She is awake. She is not in acute distress. Appearance: She is well-developed. She is not ill-appearing, toxic-appearing or diaphoretic. HENT:      Head: Normocephalic and atraumatic. Comments: No sign of acute head or face injuries  Eyes:      Pupils: Pupils are equal, round, and reactive to light.    Cardiovascular:      Rate and Rhythm: Normal rate and regular rhythm. Heart sounds: Normal heart sounds. No murmur heard. Pulmonary:      Effort: Pulmonary effort is normal. No respiratory distress. Breath sounds: Normal breath sounds. No stridor, decreased air movement or transmitted upper airway sounds. No decreased breath sounds, wheezing, rhonchi or rales. Chest:      Chest wall: No tenderness. Abdominal:      General: Bowel sounds are normal. There is no distension. Palpations: Abdomen is soft. There is no pulsatile mass. Tenderness: There is no abdominal tenderness. There is no right CVA tenderness, left CVA tenderness, guarding or rebound. Musculoskeletal:         General: No swelling, tenderness, deformity or signs of injury. Cervical back: Normal range of motion and neck supple. No signs of trauma or rigidity. No pain with movement, spinous process tenderness or muscular tenderness. Normal range of motion. Right lower leg: No edema. Left lower leg: No edema. Comments: Arms legs show no signs of acute bone or joint injury and are neurovascular intact. No pretibial edema or calf pain. Arthritic architectural changes to most joints noted. No cervical, thoracic or lumbar spine tenderness. Skin:     General: Skin is warm and dry. Coloration: Skin is not cyanotic, jaundiced, mottled or pale. Findings: No erythema or rash. Neurological:      General: No focal deficit present. Mental Status: She is alert. GCS: GCS eye subscore is 4. GCS verbal subscore is 5. GCS motor subscore is 6. Cranial Nerves: Cranial nerves are intact. No cranial nerve deficit. Sensory: Sensation is intact. Motor: Motor function is intact. Coordination: Coordination is intact. Coordination normal.      Comments: Oriented to person and place, not sure of the year. Questionably accurate historian. Psychiatric:         Behavior: Behavior is cooperative.           Procedures     MDM ED Course as of 01/03/22 1506   Fri Dec 31, 2021   1250 White count noted, patient on chronic steroids. [NC]      ED Course User Index  [NC] Adonay Florian DO     EKG Interpretation    Interpreted by emergency department physician    Rhythm: normal sinus   Rate: 81  Axis: normal  Ectopy: none  Conduction: normal  ST Segments: no acute change  T Waves: no acute change  Q Waves: none    Clinical Impression: no acute changes    Adonay Florian DO     ED Course as of 01/03/22 1506   Fri Dec 31, 2021   1250 White count noted, patient on chronic steroids.  [NC]      ED Course User Index  [NC] Adonay Florian DO       --------------------------------------------- PAST HISTORY ---------------------------------------------  Past Medical History:  has a past medical history of Abdominal tenderness of left lower quadrant, Angina pectoris ([address] Arthritis, Bacteremia, Bacterial cellulitis, Bite of animal, Cardiac dysrhythmia, Cellulitis and abscess of upper extremity, Cellulitis of forearm, Cellulitis of lower limb, Chest wall pain, Colitis, Cough, Degenerative joint disease involving multiple joints, Dizziness and giddiness, Edema of lower extremity, Flare of rheumatoid arthritis ([address] Frozen shoulder, GERD (gastroesophageal reflux disease), Hip pain, left, History of blood transfusion, Hyperlipidemia, Hypertension, Hypotensive syncope, Hypothyroidism, Laceration with foreign body of other part of head, initial encounter, Left flank pain, Mild dehydration, Mucous membrane inflammation, Multiple fractures of ribs of right side, Musculoskeletal pain, Open bite wound of skin, Oral candidiasis, Orthostatic hypotension, Other specified joint disorders, right ankle and foot, Ovarian mass, Peptic ulcer disease, Peripheral vertigo, Rheumatoid arthritis(714.0), Right inguinal hernia, Senile osteoporosis, Spinal stenosis, lumbar region without neurogenic claudication, Spontaneous ecchymosis, Thyroid disease, and Varicose veins of lower extremity. Past Surgical History:  has a past surgical history that includes Carpal tunnel release; Cholecystectomy; joint replacement; Inguinal hernia repair (Right); knee surgery (Left, 1990); Elbow surgery (Left, 1998); Upper gastrointestinal endoscopy (N/A, 7/22/2020); and Colonoscopy (2007). Social History:  reports that she has never smoked. She has never used smokeless tobacco. She reports that she does not drink alcohol and does not use drugs. Family History: family history includes Heart Disease in her brother. The patients home medications have been reviewed.     Allergies: Levofloxacin, Bactrim [sulfamethoxazole-trimethoprim], Erythromycin, Amoxicillin, Celebrex [celecoxib], Levaquin [levofloxacin], Clinoril [sulindac], Codeine, and Morphine    -------------------------------------------------- RESULTS -------------------------------------------------  Labs:  Results for orders placed or performed during the hospital encounter of 12/31/21   CBC Auto Differential   Result Value Ref Range    WBC 16.3 (H) 4.5 - 11.5 E9/L    RBC 3.90 3.50 - 5.50 E12/L    Hemoglobin 12.6 11.5 - 15.5 g/dL    Hematocrit 39.7 34.0 - 48.0 %    .8 (H) 80.0 - 99.9 fL    MCH 32.3 26.0 - 35.0 pg    MCHC 31.7 (L) 32.0 - 34.5 %    RDW 14.4 11.5 - 15.0 fL    Platelets 096 571 - 045 E9/L    MPV 8.5 7.0 - 12.0 fL    Neutrophils % 88.0 (H) 43.0 - 80.0 %    Lymphocytes % 5.0 (L) 20.0 - 42.0 %    Monocytes % 7.0 2.0 - 12.0 %    Eosinophils % 0.0 0.0 - 6.0 %    Basophils % 0.0 0.0 - 2.0 %    Neutrophils Absolute 14.34 (H) 1.80 - 7.30 E9/L    Lymphocytes Absolute 0.82 (L) 1.50 - 4.00 E9/L    Monocytes Absolute 1.14 (H) 0.10 - 0.95 E9/L    Eosinophils Absolute 0.00 (L) 0.05 - 0.50 E9/L    Basophils Absolute 0.00 0.00 - 0.20 E9/L    RBC Morphology Normal    Comprehensive Metabolic Panel   Result Value Ref Range    Sodium 132 132 - 146 mmol/L    Potassium 4.0 3.5 - 5.0 mmol/L    Chloride 95 (L) 98 - 107 mmol/L    CO2 23 22 - 29 mmol/L    Anion Gap 14 7 - 16 mmol/L    Glucose 89 74 - 99 mg/dL    BUN 17 6 - 23 mg/dL    CREATININE 1.2 (H) 0.5 - 1.0 mg/dL    GFR Non-African American 42 >=60 mL/min/1.73    GFR African American 51     Calcium 9.2 8.6 - 10.2 mg/dL    Total Protein 6.5 6.4 - 8.3 g/dL    Albumin 3.9 3.5 - 5.2 g/dL    Total Bilirubin 0.5 0.0 - 1.2 mg/dL    Alkaline Phosphatase 107 (H) 35 - 104 U/L    ALT 9 0 - 32 U/L    AST 20 0 - 31 U/L   Protime-INR   Result Value Ref Range    Protime 11.6 9.3 - 12.4 sec    INR 1.0    APTT   Result Value Ref Range    aPTT 22.5 (L) 24.5 - 35.1 sec   Magnesium   Result Value Ref Range    Magnesium 2.1 1.6 - 2.6 mg/dL   Troponin   Result Value Ref Range    Troponin, High Sensitivity 54 (H) 0 - 9 ng/L   Urinalysis   Result Value Ref Range    Color, UA Yellow Straw/Yellow    Clarity, UA Clear Clear    Glucose, Ur Negative Negative mg/dL    Bilirubin Urine Negative Negative    Ketones, Urine Negative Negative mg/dL    Specific Gravity, UA 1.010 1.005 - 1.030    Blood, Urine TRACE (A) Negative    pH, UA 7.0 5.0 - 9.0    Protein, UA Negative Negative mg/dL    Urobilinogen, Urine 0.2 <2.0 E.U./dL    Nitrite, Urine Negative Negative    Leukocyte Esterase, Urine Negative Negative   Troponin   Result Value Ref Range    Troponin, High Sensitivity 49 (H) 0 - 9 ng/L   Microscopic Urinalysis   Result Value Ref Range    WBC, UA 1-3 0 - 5 /HPF    RBC, UA 1-3 0 - 2 /HPF    Epithelial Cells, UA FEW /HPF    Bacteria, UA FEW (A) None Seen /HPF   EKG 12 Lead   Result Value Ref Range    Ventricular Rate 81 BPM    Atrial Rate 81 BPM    P-R Interval 138 ms    QRS Duration 72 ms    Q-T Interval 376 ms    QTc Calculation (Bazett) 436 ms    P Axis 92 degrees    R Axis 12 degrees    T Axis 73 degrees       Radiology:  XR CHEST (2 VW)   Final Result   1. Lateral view shows findings opacities in bilateral costophrenic sulci   which could indicate pleural effusions.   Short-term follow-up may be helpful for further evaluation. 2. Stable cardiomegaly. CT HEAD WO CONTRAST   Final Result   1. No acute intracranial abnormality. 2.  Prominence of the ventricles and sulci suggestive underlying signs of   cerebral and cerebellar atrophy. 3.  Evidence of deep white matter small vessel disease      4. Acute right maxillary sinusitis      RECOMMENDATIONS:   Unavailable             ------------------------- NURSING NOTES AND VITALS REVIEWED ---------------------------  Date / Time Roomed:  12/31/2021 10:49 AM  ED Bed Assignment:  22/22    The nursing notes within the ED encounter and vital signs as below have been reviewed. /84   Pulse 75   Temp 98.9 °F (37.2 °C)   Resp 18   Ht 5' (1.524 m)   Wt 112 lb (50.8 kg)   SpO2 91%   BMI 21.87 kg/m²   Oxygen Saturation Interpretation: Normal    We had the patient move around the bed, she got up, she was not lightheaded orthostatic, maintain pulse ox above 93% during movement. In further discussion she did not actually have a witnessed syncopal event. She was sitting at a table and apparently was slumped over in sounds more like she had fallen asleep and they were worried about her pulse ox or pulse at that time being low, throughout her stay here her pulse has been fine, her pulse ox has been well. She has been in no distress. We have moved around a few times and she maintains pulse ox with movement in the mid 90s. She has no lightheadedness or near syncopal while her stay in the ER.      ------------------------------------------ PROGRESS NOTES ------------------------------------------  I have spoken with the patient and discussed todays results, in addition to providing specific details for the plan of care and counseling regarding the diagnosis and prognosis. Their questions are answered at this time and they are agreeable with the plan. I discussed at length with them reasons for immediate return here for re evaluation.  They will followup with primary care by calling their office tomorrow. --------------------------------- ADDITIONAL PROVIDER NOTES ---------------------------------  At this time the patient is without objective evidence of an acute process requiring hospitalization or inpatient management. They have remained hemodynamically stable throughout their entire ED visit and are stable for discharge with outpatient follow-up. The plan has been discussed in detail and they are aware of the specific conditions for emergent return, as well as the importance of follow-up. Discharge Medication List as of 12/31/2021  2:49 PM      START taking these medications    Details   albuterol sulfate HFA (PROAIR HFA) 108 (90 Base) MCG/ACT inhaler Inhale 2 puffs into the lungs every 6 hours as needed for Wheezing, Disp-18 g, R-0Print             Diagnosis:  1. Syncope and collapse        Disposition:  Patient's disposition: Discharge to nursing home  Patient's condition is stable.          Ria Egan DO  01/03/22 1507

## 2022-01-01 ENCOUNTER — APPOINTMENT (OUTPATIENT)
Dept: GENERAL RADIOLOGY | Age: 87
End: 2022-01-01
Payer: MEDICARE

## 2022-01-01 ENCOUNTER — HOSPITAL ENCOUNTER (EMERGENCY)
Age: 87
Discharge: HOME | End: 2022-01-01
Attending: EMERGENCY MEDICINE
Payer: MEDICARE

## 2022-01-01 ENCOUNTER — APPOINTMENT (OUTPATIENT)
Dept: CT IMAGING | Age: 87
End: 2022-01-01
Payer: MEDICARE

## 2022-01-01 ENCOUNTER — APPOINTMENT (OUTPATIENT)
Dept: ULTRASOUND IMAGING | Age: 87
End: 2022-01-01
Payer: MEDICARE

## 2022-01-01 ENCOUNTER — HOSPITAL ENCOUNTER (OUTPATIENT)
Age: 87
Setting detail: OBSERVATION
Discharge: NURSING, REHAB OR LONG-TERM CARE | End: 2022-01-25
Attending: EMERGENCY MEDICINE | Admitting: FAMILY MEDICINE
Payer: MEDICARE

## 2022-01-01 ENCOUNTER — HOSPITAL ENCOUNTER (EMERGENCY)
Age: 87
Discharge: NURSING, REHAB OR LONG-TERM CARE | End: 2022-01-16
Attending: EMERGENCY MEDICINE
Payer: MEDICARE

## 2022-01-01 ENCOUNTER — HOSPITAL ENCOUNTER (OUTPATIENT)
Age: 87
Setting detail: OBSERVATION
Discharge: OTHER INSTITUTION | End: 2022-01-06
Attending: STUDENT IN AN ORGANIZED HEALTH CARE EDUCATION/TRAINING PROGRAM | Admitting: FAMILY MEDICINE
Payer: MEDICARE

## 2022-01-01 VITALS
OXYGEN SATURATION: 93 % | TEMPERATURE: 97 F | SYSTOLIC BLOOD PRESSURE: 130 MMHG | DIASTOLIC BLOOD PRESSURE: 103 MMHG | HEART RATE: 104 BPM | RESPIRATION RATE: 18 BRPM

## 2022-01-01 VITALS
OXYGEN SATURATION: 95 % | WEIGHT: 112 LBS | SYSTOLIC BLOOD PRESSURE: 180 MMHG | DIASTOLIC BLOOD PRESSURE: 100 MMHG | TEMPERATURE: 98 F | HEART RATE: 101 BPM | RESPIRATION RATE: 20 BRPM | HEIGHT: 60 IN | BODY MASS INDEX: 21.99 KG/M2

## 2022-01-01 VITALS
HEART RATE: 98 BPM | DIASTOLIC BLOOD PRESSURE: 82 MMHG | BODY MASS INDEX: 21.99 KG/M2 | SYSTOLIC BLOOD PRESSURE: 152 MMHG | HEIGHT: 60 IN | OXYGEN SATURATION: 100 % | RESPIRATION RATE: 20 BRPM | WEIGHT: 112 LBS | TEMPERATURE: 99.5 F

## 2022-01-01 VITALS
OXYGEN SATURATION: 100 % | SYSTOLIC BLOOD PRESSURE: 149 MMHG | DIASTOLIC BLOOD PRESSURE: 75 MMHG | RESPIRATION RATE: 20 BRPM | TEMPERATURE: 97.3 F | HEART RATE: 90 BPM

## 2022-01-01 DIAGNOSIS — N17.9 AKI (ACUTE KIDNEY INJURY) (HCC): Primary | ICD-10-CM

## 2022-01-01 DIAGNOSIS — R06.02 SHORTNESS OF BREATH: Primary | ICD-10-CM

## 2022-01-01 DIAGNOSIS — J41.0 SIMPLE CHRONIC BRONCHITIS (HCC): ICD-10-CM

## 2022-01-01 DIAGNOSIS — R55 SYNCOPE AND COLLAPSE: Primary | ICD-10-CM

## 2022-01-01 DIAGNOSIS — S01.01XA LACERATION OF SCALP, INITIAL ENCOUNTER: Primary | ICD-10-CM

## 2022-01-01 LAB
ALBUMIN SERPL-MCNC: 3.3 G/DL (ref 3.5–5.2)
ALBUMIN SERPL-MCNC: 3.4 G/DL (ref 3.5–5.2)
ALBUMIN SERPL-MCNC: 3.8 G/DL (ref 3.5–5.2)
ALP BLD-CCNC: 131 U/L (ref 35–104)
ALP BLD-CCNC: 133 U/L (ref 35–104)
ALP BLD-CCNC: 146 U/L (ref 35–104)
ALT SERPL-CCNC: 11 U/L (ref 0–32)
ALT SERPL-CCNC: 14 U/L (ref 0–32)
ALT SERPL-CCNC: 17 U/L (ref 0–32)
ANION GAP SERPL CALCULATED.3IONS-SCNC: 11 MMOL/L (ref 7–16)
ANION GAP SERPL CALCULATED.3IONS-SCNC: 11 MMOL/L (ref 7–16)
ANION GAP SERPL CALCULATED.3IONS-SCNC: 13 MMOL/L (ref 7–16)
ANION GAP SERPL CALCULATED.3IONS-SCNC: 16 MMOL/L (ref 7–16)
ANION GAP SERPL CALCULATED.3IONS-SCNC: 16 MMOL/L (ref 7–16)
AST SERPL-CCNC: 23 U/L (ref 0–31)
AST SERPL-CCNC: 32 U/L (ref 0–31)
AST SERPL-CCNC: 40 U/L (ref 0–31)
BACTERIA: ABNORMAL /HPF
BASOPHILS ABSOLUTE: 0 E9/L (ref 0–0.2)
BASOPHILS ABSOLUTE: 0.01 E9/L (ref 0–0.2)
BASOPHILS ABSOLUTE: 0.02 E9/L (ref 0–0.2)
BASOPHILS ABSOLUTE: 0.05 E9/L (ref 0–0.2)
BASOPHILS RELATIVE PERCENT: 0.1 % (ref 0–2)
BASOPHILS RELATIVE PERCENT: 0.1 % (ref 0–2)
BASOPHILS RELATIVE PERCENT: 0.4 % (ref 0–2)
BASOPHILS RELATIVE PERCENT: 0.5 % (ref 0–2)
BILIRUB SERPL-MCNC: 0.7 MG/DL (ref 0–1.2)
BILIRUB SERPL-MCNC: 0.7 MG/DL (ref 0–1.2)
BILIRUB SERPL-MCNC: 0.8 MG/DL (ref 0–1.2)
BILIRUBIN DIRECT: 0.3 MG/DL (ref 0–0.3)
BILIRUBIN URINE: NEGATIVE
BILIRUBIN URINE: NEGATIVE
BILIRUBIN, INDIRECT: 0.4 MG/DL (ref 0–1)
BLOOD, URINE: NEGATIVE
BLOOD, URINE: NEGATIVE
BUN BLDV-MCNC: 20 MG/DL (ref 6–23)
BUN BLDV-MCNC: 27 MG/DL (ref 6–23)
BUN BLDV-MCNC: 27 MG/DL (ref 6–23)
BUN BLDV-MCNC: 56 MG/DL (ref 6–23)
BUN BLDV-MCNC: 74 MG/DL (ref 6–23)
BURR CELLS: ABNORMAL
BURR CELLS: ABNORMAL
CALCIUM SERPL-MCNC: 8.5 MG/DL (ref 8.6–10.2)
CALCIUM SERPL-MCNC: 8.5 MG/DL (ref 8.6–10.2)
CALCIUM SERPL-MCNC: 8.8 MG/DL (ref 8.6–10.2)
CALCIUM SERPL-MCNC: 8.8 MG/DL (ref 8.6–10.2)
CALCIUM SERPL-MCNC: 9.2 MG/DL (ref 8.6–10.2)
CHLORIDE BLD-SCNC: 102 MMOL/L (ref 98–107)
CHLORIDE BLD-SCNC: 115 MMOL/L (ref 98–107)
CHLORIDE BLD-SCNC: 96 MMOL/L (ref 98–107)
CHLORIDE BLD-SCNC: 96 MMOL/L (ref 98–107)
CHLORIDE BLD-SCNC: 99 MMOL/L (ref 98–107)
CHOLESTEROL, TOTAL: 246 MG/DL (ref 0–199)
CLARITY: ABNORMAL
CLARITY: CLEAR
CO2: 14 MMOL/L (ref 22–29)
CO2: 17 MMOL/L (ref 22–29)
CO2: 21 MMOL/L (ref 22–29)
CO2: 22 MMOL/L (ref 22–29)
CO2: 26 MMOL/L (ref 22–29)
COLOR: YELLOW
COLOR: YELLOW
CREAT SERPL-MCNC: 1.1 MG/DL (ref 0.5–1)
CREAT SERPL-MCNC: 1.1 MG/DL (ref 0.5–1)
CREAT SERPL-MCNC: 1.3 MG/DL (ref 0.5–1)
CREAT SERPL-MCNC: 1.5 MG/DL (ref 0.5–1)
CREAT SERPL-MCNC: 2.2 MG/DL (ref 0.5–1)
CREATININE URINE: 62 MG/DL (ref 29–226)
EKG ATRIAL RATE: 68 BPM
EKG ATRIAL RATE: 76 BPM
EKG ATRIAL RATE: 93 BPM
EKG ATRIAL RATE: 96 BPM
EKG P AXIS: 52 DEGREES
EKG P AXIS: 85 DEGREES
EKG P AXIS: 90 DEGREES
EKG P-R INTERVAL: 120 MS
EKG P-R INTERVAL: 142 MS
EKG P-R INTERVAL: 144 MS
EKG P-R INTERVAL: 148 MS
EKG Q-T INTERVAL: 326 MS
EKG Q-T INTERVAL: 364 MS
EKG Q-T INTERVAL: 398 MS
EKG Q-T INTERVAL: 410 MS
EKG QRS DURATION: 66 MS
EKG QRS DURATION: 70 MS
EKG QRS DURATION: 72 MS
EKG QRS DURATION: 74 MS
EKG QTC CALCULATION (BAZETT): 411 MS
EKG QTC CALCULATION (BAZETT): 435 MS
EKG QTC CALCULATION (BAZETT): 447 MS
EKG QTC CALCULATION (BAZETT): 452 MS
EKG R AXIS: -13 DEGREES
EKG R AXIS: -2 DEGREES
EKG R AXIS: -20 DEGREES
EKG R AXIS: 1 DEGREES
EKG T AXIS: 109 DEGREES
EKG T AXIS: 54 DEGREES
EKG T AXIS: 57 DEGREES
EKG T AXIS: 77 DEGREES
EKG VENTRICULAR RATE: 68 BPM
EKG VENTRICULAR RATE: 76 BPM
EKG VENTRICULAR RATE: 93 BPM
EKG VENTRICULAR RATE: 96 BPM
EOSINOPHIL, URINE: 0 % (ref 0–1)
EOSINOPHILS ABSOLUTE: 0 E9/L (ref 0.05–0.5)
EOSINOPHILS ABSOLUTE: 0.01 E9/L (ref 0.05–0.5)
EOSINOPHILS ABSOLUTE: 0.03 E9/L (ref 0.05–0.5)
EOSINOPHILS ABSOLUTE: 0.07 E9/L (ref 0.05–0.5)
EOSINOPHILS RELATIVE PERCENT: 0.1 % (ref 0–6)
EOSINOPHILS RELATIVE PERCENT: 0.5 % (ref 0–6)
EOSINOPHILS RELATIVE PERCENT: 0.6 % (ref 0–6)
EOSINOPHILS RELATIVE PERCENT: 0.8 % (ref 0–6)
EPITHELIAL CELLS, UA: ABNORMAL /HPF
GFR AFRICAN AMERICAN: 25
GFR AFRICAN AMERICAN: 39
GFR AFRICAN AMERICAN: 46
GFR AFRICAN AMERICAN: 56
GFR AFRICAN AMERICAN: 56
GFR NON-AFRICAN AMERICAN: 21 ML/MIN/1.73
GFR NON-AFRICAN AMERICAN: 32 ML/MIN/1.73
GFR NON-AFRICAN AMERICAN: 38 ML/MIN/1.73
GFR NON-AFRICAN AMERICAN: 46 ML/MIN/1.73
GFR NON-AFRICAN AMERICAN: 46 ML/MIN/1.73
GLUCOSE BLD-MCNC: 121 MG/DL (ref 74–99)
GLUCOSE BLD-MCNC: 62 MG/DL (ref 74–99)
GLUCOSE BLD-MCNC: 70 MG/DL (ref 74–99)
GLUCOSE BLD-MCNC: 72 MG/DL (ref 74–99)
GLUCOSE BLD-MCNC: 86 MG/DL (ref 74–99)
GLUCOSE URINE: NEGATIVE MG/DL
GLUCOSE URINE: NEGATIVE MG/DL
HBA1C MFR BLD: 5.2 % (ref 4–5.6)
HCT VFR BLD CALC: 38.9 % (ref 34–48)
HCT VFR BLD CALC: 39.2 % (ref 34–48)
HCT VFR BLD CALC: 41.3 % (ref 34–48)
HCT VFR BLD CALC: 43.2 % (ref 34–48)
HDLC SERPL-MCNC: 60 MG/DL
HEMOGLOBIN: 12.4 G/DL (ref 11.5–15.5)
HEMOGLOBIN: 12.4 G/DL (ref 11.5–15.5)
HEMOGLOBIN: 12.9 G/DL (ref 11.5–15.5)
HEMOGLOBIN: 13.2 G/DL (ref 11.5–15.5)
IMMATURE GRANULOCYTES #: 0.05 E9/L
IMMATURE GRANULOCYTES #: 0.1 E9/L
IMMATURE GRANULOCYTES #: 0.12 E9/L
IMMATURE GRANULOCYTES %: 1 % (ref 0–5)
IMMATURE GRANULOCYTES %: 1 % (ref 0–5)
IMMATURE GRANULOCYTES %: 1.3 % (ref 0–5)
KETONES, URINE: NEGATIVE MG/DL
KETONES, URINE: NEGATIVE MG/DL
LACTIC ACID: 2.2 MMOL/L (ref 0.5–2.2)
LDL CHOLESTEROL CALCULATED: 157 MG/DL (ref 0–99)
LEUKOCYTE ESTERASE, URINE: ABNORMAL
LEUKOCYTE ESTERASE, URINE: NEGATIVE
LIPASE: 45 U/L (ref 13–60)
LV EF: 68 %
LVEF MODALITY: NORMAL
LYMPHOCYTES ABSOLUTE: 0.25 E9/L (ref 1.5–4)
LYMPHOCYTES ABSOLUTE: 0.42 E9/L (ref 1.5–4)
LYMPHOCYTES ABSOLUTE: 1.18 E9/L (ref 1.5–4)
LYMPHOCYTES ABSOLUTE: 1.36 E9/L (ref 1.5–4)
LYMPHOCYTES RELATIVE PERCENT: 14.8 % (ref 20–42)
LYMPHOCYTES RELATIVE PERCENT: 2.6 % (ref 20–42)
LYMPHOCYTES RELATIVE PERCENT: 22.9 % (ref 20–42)
LYMPHOCYTES RELATIVE PERCENT: 4.4 % (ref 20–42)
MAGNESIUM: 2.3 MG/DL (ref 1.6–2.6)
MAGNESIUM: 2.3 MG/DL (ref 1.6–2.6)
MCH RBC QN AUTO: 31 PG (ref 26–35)
MCH RBC QN AUTO: 31.4 PG (ref 26–35)
MCH RBC QN AUTO: 31.6 PG (ref 26–35)
MCH RBC QN AUTO: 32.2 PG (ref 26–35)
MCHC RBC AUTO-ENTMCNC: 30.6 % (ref 32–34.5)
MCHC RBC AUTO-ENTMCNC: 31.2 % (ref 32–34.5)
MCHC RBC AUTO-ENTMCNC: 31.6 % (ref 32–34.5)
MCHC RBC AUTO-ENTMCNC: 31.9 % (ref 32–34.5)
MCV RBC AUTO: 101.2 FL (ref 80–99.9)
MCV RBC AUTO: 101.4 FL (ref 80–99.9)
MCV RBC AUTO: 101.8 FL (ref 80–99.9)
MCV RBC AUTO: 98.5 FL (ref 80–99.9)
METAMYELOCYTES RELATIVE PERCENT: 0.9 % (ref 0–1)
METER GLUCOSE: 109 MG/DL (ref 74–99)
METER GLUCOSE: 118 MG/DL (ref 74–99)
MONOCYTES ABSOLUTE: 0.5 E9/L (ref 0.1–0.95)
MONOCYTES ABSOLUTE: 0.61 E9/L (ref 0.1–0.95)
MONOCYTES ABSOLUTE: 0.65 E9/L (ref 0.1–0.95)
MONOCYTES ABSOLUTE: 1.23 E9/L (ref 0.1–0.95)
MONOCYTES RELATIVE PERCENT: 12.6 % (ref 2–12)
MONOCYTES RELATIVE PERCENT: 13.4 % (ref 2–12)
MONOCYTES RELATIVE PERCENT: 6.1 % (ref 2–12)
MONOCYTES RELATIVE PERCENT: 6.4 % (ref 2–12)
NEUTROPHILS ABSOLUTE: 3.23 E9/L (ref 1.8–7.3)
NEUTROPHILS ABSOLUTE: 6.38 E9/L (ref 1.8–7.3)
NEUTROPHILS ABSOLUTE: 7.64 E9/L (ref 1.8–7.3)
NEUTROPHILS ABSOLUTE: 8.42 E9/L (ref 1.8–7.3)
NEUTROPHILS RELATIVE PERCENT: 62.5 % (ref 43–80)
NEUTROPHILS RELATIVE PERCENT: 69.2 % (ref 43–80)
NEUTROPHILS RELATIVE PERCENT: 88 % (ref 43–80)
NEUTROPHILS RELATIVE PERCENT: 90.4 % (ref 43–80)
NITRITE, URINE: NEGATIVE
NITRITE, URINE: NEGATIVE
OSMOLALITY URINE: 547 MOSM/KG (ref 300–900)
OVALOCYTES: ABNORMAL
PDW BLD-RTO: 14 FL (ref 11.5–15)
PDW BLD-RTO: 14 FL (ref 11.5–15)
PDW BLD-RTO: 14.3 FL (ref 11.5–15)
PDW BLD-RTO: 14.6 FL (ref 11.5–15)
PH UA: 5 (ref 5–9)
PH UA: 6.5 (ref 5–9)
PLATELET # BLD: 296 E9/L (ref 130–450)
PLATELET # BLD: 297 E9/L (ref 130–450)
PLATELET # BLD: 311 E9/L (ref 130–450)
PLATELET # BLD: 312 E9/L (ref 130–450)
PMV BLD AUTO: 8.7 FL (ref 7–12)
PMV BLD AUTO: 8.8 FL (ref 7–12)
PMV BLD AUTO: 9.1 FL (ref 7–12)
PMV BLD AUTO: 9.9 FL (ref 7–12)
POIKILOCYTES: ABNORMAL
POIKILOCYTES: ABNORMAL
POLYCHROMASIA: ABNORMAL
POTASSIUM REFLEX MAGNESIUM: 4 MMOL/L (ref 3.5–5)
POTASSIUM REFLEX MAGNESIUM: 4.8 MMOL/L (ref 3.5–5)
POTASSIUM REFLEX MAGNESIUM: 5.2 MMOL/L (ref 3.5–5)
POTASSIUM SERPL-SCNC: 4.3 MMOL/L (ref 3.5–5)
POTASSIUM SERPL-SCNC: 4.4 MMOL/L (ref 3.5–5)
PRO-BNP: 2727 PG/ML (ref 0–450)
PRO-BNP: 3735 PG/ML (ref 0–450)
PRO-BNP: 5373 PG/ML (ref 0–450)
PROTEIN UA: ABNORMAL MG/DL
PROTEIN UA: NEGATIVE MG/DL
RBC # BLD: 3.85 E12/L (ref 3.5–5.5)
RBC # BLD: 3.95 E12/L (ref 3.5–5.5)
RBC # BLD: 4.08 E12/L (ref 3.5–5.5)
RBC # BLD: 4.26 E12/L (ref 3.5–5.5)
RBC UA: ABNORMAL /HPF (ref 0–2)
REASON FOR REJECTION: NORMAL
REJECTED TEST: NORMAL
SODIUM BLD-SCNC: 132 MMOL/L (ref 132–146)
SODIUM BLD-SCNC: 133 MMOL/L (ref 132–146)
SODIUM BLD-SCNC: 133 MMOL/L (ref 132–146)
SODIUM BLD-SCNC: 135 MMOL/L (ref 132–146)
SODIUM BLD-SCNC: 142 MMOL/L (ref 132–146)
SODIUM URINE: 76 MMOL/L
SPECIFIC GRAVITY UA: 1.02 (ref 1–1.03)
SPECIFIC GRAVITY UA: 1.02 (ref 1–1.03)
T4 FREE: 0.99 NG/DL (ref 0.93–1.7)
TOTAL PROTEIN: 6.1 G/DL (ref 6.4–8.3)
TOTAL PROTEIN: 6.6 G/DL (ref 6.4–8.3)
TOTAL PROTEIN: 6.6 G/DL (ref 6.4–8.3)
TRIGL SERPL-MCNC: 144 MG/DL (ref 0–149)
TROPONIN, HIGH SENSITIVITY: 47 NG/L (ref 0–9)
TROPONIN, HIGH SENSITIVITY: 56 NG/L (ref 0–9)
TROPONIN, HIGH SENSITIVITY: 66 NG/L (ref 0–9)
TROPONIN, HIGH SENSITIVITY: 69 NG/L (ref 0–9)
TROPONIN, HIGH SENSITIVITY: 79 NG/L (ref 0–9)
TROPONIN, HIGH SENSITIVITY: 90 NG/L (ref 0–9)
TSH SERPL DL<=0.05 MIU/L-ACNC: 4.08 UIU/ML (ref 0.27–4.2)
UREA NITROGEN, UR: 950 MG/DL (ref 800–1666)
URINE CULTURE, ROUTINE: NORMAL
UROBILINOGEN, URINE: 0.2 E.U./DL
UROBILINOGEN, URINE: 2 E.U./DL
VLDLC SERPL CALC-MCNC: 29 MG/DL
WBC # BLD: 5.2 E9/L (ref 4.5–11.5)
WBC # BLD: 8.4 E9/L (ref 4.5–11.5)
WBC # BLD: 9.2 E9/L (ref 4.5–11.5)
WBC # BLD: 9.6 E9/L (ref 4.5–11.5)
WBC UA: ABNORMAL /HPF (ref 0–5)

## 2022-01-01 PROCEDURE — 84300 ASSAY OF URINE SODIUM: CPT

## 2022-01-01 PROCEDURE — APPSS60 APP SPLIT SHARED TIME 46-60 MINUTES: Performed by: PHYSICIAN ASSISTANT

## 2022-01-01 PROCEDURE — 97161 PT EVAL LOW COMPLEX 20 MIN: CPT

## 2022-01-01 PROCEDURE — 93005 ELECTROCARDIOGRAM TRACING: CPT | Performed by: STUDENT IN AN ORGANIZED HEALTH CARE EDUCATION/TRAINING PROGRAM

## 2022-01-01 PROCEDURE — G0378 HOSPITAL OBSERVATION PER HR: HCPCS

## 2022-01-01 PROCEDURE — 99283 EMERGENCY DEPT VISIT LOW MDM: CPT

## 2022-01-01 PROCEDURE — 97161 PT EVAL LOW COMPLEX 20 MIN: CPT | Performed by: PHYSICAL THERAPIST

## 2022-01-01 PROCEDURE — 6370000000 HC RX 637 (ALT 250 FOR IP): Performed by: FAMILY MEDICINE

## 2022-01-01 PROCEDURE — 85025 COMPLETE CBC W/AUTO DIFF WBC: CPT

## 2022-01-01 PROCEDURE — 80076 HEPATIC FUNCTION PANEL: CPT

## 2022-01-01 PROCEDURE — 83735 ASSAY OF MAGNESIUM: CPT

## 2022-01-01 PROCEDURE — 97530 THERAPEUTIC ACTIVITIES: CPT

## 2022-01-01 PROCEDURE — 87088 URINE BACTERIA CULTURE: CPT

## 2022-01-01 PROCEDURE — 71045 X-RAY EXAM CHEST 1 VIEW: CPT

## 2022-01-01 PROCEDURE — 82962 GLUCOSE BLOOD TEST: CPT

## 2022-01-01 PROCEDURE — 80061 LIPID PANEL: CPT

## 2022-01-01 PROCEDURE — 97110 THERAPEUTIC EXERCISES: CPT

## 2022-01-01 PROCEDURE — 81001 URINALYSIS AUTO W/SCOPE: CPT

## 2022-01-01 PROCEDURE — 72170 X-RAY EXAM OF PELVIS: CPT

## 2022-01-01 PROCEDURE — 80048 BASIC METABOLIC PNL TOTAL CA: CPT

## 2022-01-01 PROCEDURE — 97165 OT EVAL LOW COMPLEX 30 MIN: CPT

## 2022-01-01 PROCEDURE — 93010 ELECTROCARDIOGRAM REPORT: CPT | Performed by: INTERNAL MEDICINE

## 2022-01-01 PROCEDURE — 82570 ASSAY OF URINE CREATININE: CPT

## 2022-01-01 PROCEDURE — 6360000002 HC RX W HCPCS: Performed by: FAMILY MEDICINE

## 2022-01-01 PROCEDURE — 87205 SMEAR GRAM STAIN: CPT

## 2022-01-01 PROCEDURE — 80053 COMPREHEN METABOLIC PANEL: CPT

## 2022-01-01 PROCEDURE — 96374 THER/PROPH/DIAG INJ IV PUSH: CPT

## 2022-01-01 PROCEDURE — 83605 ASSAY OF LACTIC ACID: CPT

## 2022-01-01 PROCEDURE — 6370000000 HC RX 637 (ALT 250 FOR IP): Performed by: NURSE PRACTITIONER

## 2022-01-01 PROCEDURE — 99204 OFFICE O/P NEW MOD 45 MIN: CPT | Performed by: INTERNAL MEDICINE

## 2022-01-01 PROCEDURE — 70450 CT HEAD/BRAIN W/O DYE: CPT

## 2022-01-01 PROCEDURE — 99284 EMERGENCY DEPT VISIT MOD MDM: CPT

## 2022-01-01 PROCEDURE — 96361 HYDRATE IV INFUSION ADD-ON: CPT

## 2022-01-01 PROCEDURE — 12001 RPR S/N/AX/GEN/TRNK 2.5CM/<: CPT

## 2022-01-01 PROCEDURE — 2580000003 HC RX 258: Performed by: NURSE PRACTITIONER

## 2022-01-01 PROCEDURE — 83690 ASSAY OF LIPASE: CPT

## 2022-01-01 PROCEDURE — 36415 COLL VENOUS BLD VENIPUNCTURE: CPT

## 2022-01-01 PROCEDURE — 83880 ASSAY OF NATRIURETIC PEPTIDE: CPT

## 2022-01-01 PROCEDURE — 84439 ASSAY OF FREE THYROXINE: CPT

## 2022-01-01 PROCEDURE — 2580000003 HC RX 258: Performed by: STUDENT IN AN ORGANIZED HEALTH CARE EDUCATION/TRAINING PROGRAM

## 2022-01-01 PROCEDURE — 84443 ASSAY THYROID STIM HORMONE: CPT

## 2022-01-01 PROCEDURE — 84484 ASSAY OF TROPONIN QUANT: CPT

## 2022-01-01 PROCEDURE — 2580000003 HC RX 258: Performed by: INTERNAL MEDICINE

## 2022-01-01 PROCEDURE — 93880 EXTRACRANIAL BILAT STUDY: CPT

## 2022-01-01 PROCEDURE — 93306 TTE W/DOPPLER COMPLETE: CPT

## 2022-01-01 PROCEDURE — 72125 CT NECK SPINE W/O DYE: CPT

## 2022-01-01 PROCEDURE — 51701 INSERT BLADDER CATHETER: CPT

## 2022-01-01 PROCEDURE — 96360 HYDRATION IV INFUSION INIT: CPT

## 2022-01-01 PROCEDURE — 96372 THER/PROPH/DIAG INJ SC/IM: CPT

## 2022-01-01 PROCEDURE — 83036 HEMOGLOBIN GLYCOSYLATED A1C: CPT

## 2022-01-01 PROCEDURE — 81003 URINALYSIS AUTO W/O SCOPE: CPT

## 2022-01-01 PROCEDURE — 6370000000 HC RX 637 (ALT 250 FOR IP): Performed by: INTERNAL MEDICINE

## 2022-01-01 PROCEDURE — 2580000003 HC RX 258: Performed by: FAMILY MEDICINE

## 2022-01-01 PROCEDURE — 83935 ASSAY OF URINE OSMOLALITY: CPT

## 2022-01-01 PROCEDURE — 84540 ASSAY OF URINE/UREA-N: CPT

## 2022-01-01 PROCEDURE — 2500000003 HC RX 250 WO HCPCS: Performed by: INTERNAL MEDICINE

## 2022-01-01 PROCEDURE — 6360000002 HC RX W HCPCS: Performed by: NURSE PRACTITIONER

## 2022-01-01 RX ORDER — ACETAMINOPHEN 650 MG/1
650 SUPPOSITORY RECTAL EVERY 6 HOURS PRN
Status: DISCONTINUED | OUTPATIENT
Start: 2022-01-01 | End: 2022-01-01 | Stop reason: HOSPADM

## 2022-01-01 RX ORDER — AMLODIPINE BESYLATE 2.5 MG/1
2.5 TABLET ORAL DAILY
Status: DISCONTINUED | OUTPATIENT
Start: 2022-01-01 | End: 2022-01-01 | Stop reason: HOSPADM

## 2022-01-01 RX ORDER — VITAMIN B COMPLEX
1000 TABLET ORAL DAILY
Status: DISCONTINUED | OUTPATIENT
Start: 2022-01-01 | End: 2022-01-01 | Stop reason: HOSPADM

## 2022-01-01 RX ORDER — IRON 18 MG
1 TABLET ORAL DAILY
Status: DISCONTINUED | OUTPATIENT
Start: 2022-01-01 | End: 2022-01-01 | Stop reason: ALTCHOICE

## 2022-01-01 RX ORDER — CLOTRIMAZOLE 10 MG/1
10 LOZENGE ORAL; TOPICAL EVERY 4 HOURS
Status: DISCONTINUED | OUTPATIENT
Start: 2022-01-01 | End: 2022-01-01 | Stop reason: HOSPADM

## 2022-01-01 RX ORDER — ACETAMINOPHEN 325 MG/1
650 TABLET ORAL EVERY 6 HOURS PRN
Status: DISCONTINUED | OUTPATIENT
Start: 2022-01-01 | End: 2022-01-01 | Stop reason: HOSPADM

## 2022-01-01 RX ORDER — POLYETHYLENE GLYCOL 3350 17 G/17G
17 POWDER, FOR SOLUTION ORAL DAILY
Status: DISCONTINUED | OUTPATIENT
Start: 2022-01-01 | End: 2022-01-01 | Stop reason: HOSPADM

## 2022-01-01 RX ORDER — ONDANSETRON 2 MG/ML
4 INJECTION INTRAMUSCULAR; INTRAVENOUS EVERY 6 HOURS PRN
Status: DISCONTINUED | OUTPATIENT
Start: 2022-01-01 | End: 2022-01-01 | Stop reason: HOSPADM

## 2022-01-01 RX ORDER — LOSARTAN POTASSIUM 50 MG/1
50 TABLET ORAL DAILY
Status: DISCONTINUED | OUTPATIENT
Start: 2022-01-01 | End: 2022-01-01 | Stop reason: HOSPADM

## 2022-01-01 RX ORDER — ONDANSETRON 4 MG/1
4 TABLET, ORALLY DISINTEGRATING ORAL EVERY 8 HOURS PRN
Status: DISCONTINUED | OUTPATIENT
Start: 2022-01-01 | End: 2022-01-01 | Stop reason: HOSPADM

## 2022-01-01 RX ORDER — PREDNISONE 1 MG/1
2.5 TABLET ORAL DAILY
Status: DISCONTINUED | OUTPATIENT
Start: 2022-01-01 | End: 2022-01-01 | Stop reason: HOSPADM

## 2022-01-01 RX ORDER — GABAPENTIN 100 MG/1
100 CAPSULE ORAL 2 TIMES DAILY
Status: DISCONTINUED | OUTPATIENT
Start: 2022-01-01 | End: 2022-01-01 | Stop reason: HOSPADM

## 2022-01-01 RX ORDER — SODIUM CHLORIDE 9 MG/ML
25 INJECTION, SOLUTION INTRAVENOUS PRN
Status: DISCONTINUED | OUTPATIENT
Start: 2022-01-01 | End: 2022-01-01 | Stop reason: HOSPADM

## 2022-01-01 RX ORDER — HYDROXYCHLOROQUINE SULFATE 200 MG/1
200 TABLET, FILM COATED ORAL DAILY
Status: DISCONTINUED | OUTPATIENT
Start: 2022-01-01 | End: 2022-01-01 | Stop reason: HOSPADM

## 2022-01-01 RX ORDER — SODIUM CHLORIDE 0.9 % (FLUSH) 0.9 %
5-40 SYRINGE (ML) INJECTION PRN
Status: DISCONTINUED | OUTPATIENT
Start: 2022-01-01 | End: 2022-01-01 | Stop reason: HOSPADM

## 2022-01-01 RX ORDER — MV-MN/OM3/DHA/EPA/FISH/LUT/ZEA 250-5-1 MG
1 CAPSULE ORAL DAILY
Status: DISCONTINUED | OUTPATIENT
Start: 2022-01-01 | End: 2022-01-01 | Stop reason: CLARIF

## 2022-01-01 RX ORDER — 0.9 % SODIUM CHLORIDE 0.9 %
1000 INTRAVENOUS SOLUTION INTRAVENOUS ONCE
Status: COMPLETED | OUTPATIENT
Start: 2022-01-01 | End: 2022-01-01

## 2022-01-01 RX ORDER — POLYETHYLENE GLYCOL 3350 17 G/17G
17 POWDER, FOR SOLUTION ORAL DAILY PRN
Status: DISCONTINUED | OUTPATIENT
Start: 2022-01-01 | End: 2022-01-01 | Stop reason: HOSPADM

## 2022-01-01 RX ORDER — PREDNISONE 10 MG/1
10 TABLET ORAL DAILY
Status: DISCONTINUED | OUTPATIENT
Start: 2022-01-01 | End: 2022-01-01 | Stop reason: HOSPADM

## 2022-01-01 RX ORDER — ONDANSETRON 4 MG/1
TABLET, ORALLY DISINTEGRATING ORAL
Status: DISPENSED
Start: 2022-01-01 | End: 2022-01-01

## 2022-01-01 RX ORDER — FUROSEMIDE 20 MG/1
20 TABLET ORAL DAILY
Status: DISCONTINUED | OUTPATIENT
Start: 2022-01-01 | End: 2022-01-01 | Stop reason: HOSPADM

## 2022-01-01 RX ORDER — PANTOPRAZOLE SODIUM 40 MG/1
40 TABLET, DELAYED RELEASE ORAL
Status: DISCONTINUED | OUTPATIENT
Start: 2022-01-01 | End: 2022-01-01 | Stop reason: HOSPADM

## 2022-01-01 RX ORDER — HYDROXYCHLOROQUINE SULFATE 200 MG/1
200 TABLET, FILM COATED ORAL DAILY
Status: DISCONTINUED | OUTPATIENT
Start: 2022-01-01 | End: 2022-01-01

## 2022-01-01 RX ORDER — ALBUTEROL SULFATE 2.5 MG/3ML
2.5 SOLUTION RESPIRATORY (INHALATION) EVERY 6 HOURS PRN
Status: DISCONTINUED | OUTPATIENT
Start: 2022-01-01 | End: 2022-01-01 | Stop reason: HOSPADM

## 2022-01-01 RX ORDER — CHLORAL HYDRATE 500 MG
1000 CAPSULE ORAL DAILY
Status: DISCONTINUED | OUTPATIENT
Start: 2022-01-01 | End: 2022-01-01 | Stop reason: ALTCHOICE

## 2022-01-01 RX ORDER — SODIUM CHLORIDE 0.9 % (FLUSH) 0.9 %
5-40 SYRINGE (ML) INJECTION EVERY 12 HOURS SCHEDULED
Status: DISCONTINUED | OUTPATIENT
Start: 2022-01-01 | End: 2022-01-01 | Stop reason: HOSPADM

## 2022-01-01 RX ORDER — SODIUM CHLORIDE 9 MG/ML
INJECTION, SOLUTION INTRAVENOUS CONTINUOUS
Status: DISCONTINUED | OUTPATIENT
Start: 2022-01-01 | End: 2022-01-01

## 2022-01-01 RX ORDER — METOPROLOL SUCCINATE 25 MG/1
25 TABLET, EXTENDED RELEASE ORAL DAILY
Status: DISCONTINUED | OUTPATIENT
Start: 2022-01-01 | End: 2022-01-01 | Stop reason: HOSPADM

## 2022-01-01 RX ORDER — M-VIT,TX,IRON,MINS/CALC/FOLIC 27MG-0.4MG
1 TABLET ORAL
Status: DISCONTINUED | OUTPATIENT
Start: 2022-01-01 | End: 2022-01-01 | Stop reason: HOSPADM

## 2022-01-01 RX ORDER — SODIUM CHLORIDE AND POTASSIUM CHLORIDE .9; .15 G/100ML; G/100ML
SOLUTION INTRAVENOUS CONTINUOUS
Status: DISCONTINUED | OUTPATIENT
Start: 2022-01-01 | End: 2022-01-01 | Stop reason: HOSPADM

## 2022-01-01 RX ORDER — 0.9 % SODIUM CHLORIDE 0.9 %
250 INTRAVENOUS SOLUTION INTRAVENOUS ONCE
Status: DISCONTINUED | OUTPATIENT
Start: 2022-01-01 | End: 2022-01-01 | Stop reason: HOSPADM

## 2022-01-01 RX ORDER — ALBUTEROL SULFATE 90 UG/1
2 AEROSOL, METERED RESPIRATORY (INHALATION) EVERY 6 HOURS PRN
Status: DISCONTINUED | OUTPATIENT
Start: 2022-01-01 | End: 2022-01-01 | Stop reason: CLARIF

## 2022-01-01 RX ORDER — LEVOTHYROXINE SODIUM 0.07 MG/1
75 TABLET ORAL
Status: DISCONTINUED | OUTPATIENT
Start: 2022-01-01 | End: 2022-01-01 | Stop reason: HOSPADM

## 2022-01-01 RX ORDER — AMLODIPINE BESYLATE 2.5 MG/1
2.5 TABLET ORAL DAILY
Qty: 30 TABLET | Refills: 3 | Status: SHIPPED | OUTPATIENT
Start: 2022-01-01

## 2022-01-01 RX ORDER — LOSARTAN POTASSIUM 50 MG/1
50 TABLET ORAL DAILY
Status: DISCONTINUED | OUTPATIENT
Start: 2022-01-01 | End: 2022-01-01

## 2022-01-01 RX ORDER — ACETAMINOPHEN 500 MG
500 TABLET ORAL 2 TIMES DAILY PRN
Status: DISCONTINUED | OUTPATIENT
Start: 2022-01-01 | End: 2022-01-01 | Stop reason: ALTCHOICE

## 2022-01-01 RX ORDER — PREDNISONE 1 MG/1
2.5 TABLET ORAL DAILY
Status: DISCONTINUED | OUTPATIENT
Start: 2022-01-01 | End: 2022-01-01

## 2022-01-01 RX ORDER — GABAPENTIN 300 MG/1
300 CAPSULE ORAL NIGHTLY
Status: DISCONTINUED | OUTPATIENT
Start: 2022-01-01 | End: 2022-01-01 | Stop reason: HOSPADM

## 2022-01-01 RX ADMIN — GABAPENTIN 100 MG: 100 CAPSULE ORAL at 16:12

## 2022-01-01 RX ADMIN — POLYETHYLENE GLYCOL 3350 17 G: 17 POWDER, FOR SOLUTION ORAL at 10:07

## 2022-01-01 RX ADMIN — MULTIPLE VITAMINS W/ MINERALS TAB 1 TABLET: TAB at 12:43

## 2022-01-01 RX ADMIN — HYDROXYCHLOROQUINE SULFATE 200 MG: 200 TABLET ORAL at 18:54

## 2022-01-01 RX ADMIN — Medication 1000 UNITS: at 10:03

## 2022-01-01 RX ADMIN — LEVOTHYROXINE SODIUM 75 MCG: 0.07 TABLET ORAL at 05:29

## 2022-01-01 RX ADMIN — Medication 1000 UNITS: at 10:07

## 2022-01-01 RX ADMIN — AMLODIPINE BESYLATE 2.5 MG: 2.5 TABLET ORAL at 10:39

## 2022-01-01 RX ADMIN — ENOXAPARIN SODIUM 30 MG: 100 INJECTION SUBCUTANEOUS at 21:17

## 2022-01-01 RX ADMIN — CLOTRIMAZOLE 10 MG: 10 LOZENGE ORAL; TOPICAL at 21:07

## 2022-01-01 RX ADMIN — SODIUM CHLORIDE: 9 INJECTION, SOLUTION INTRAVENOUS at 00:06

## 2022-01-01 RX ADMIN — ACETAMINOPHEN 650 MG: 325 TABLET ORAL at 09:19

## 2022-01-01 RX ADMIN — SODIUM CHLORIDE 1000 ML: 9 INJECTION, SOLUTION INTRAVENOUS at 23:00

## 2022-01-01 RX ADMIN — CLOTRIMAZOLE 10 MG: 10 LOZENGE ORAL; TOPICAL at 16:12

## 2022-01-01 RX ADMIN — SODIUM BICARBONATE: 84 INJECTION, SOLUTION INTRAVENOUS at 08:14

## 2022-01-01 RX ADMIN — PREDNISONE 10 MG: 10 TABLET ORAL at 10:03

## 2022-01-01 RX ADMIN — PREDNISONE 10 MG: 10 TABLET ORAL at 10:12

## 2022-01-01 RX ADMIN — LEVOTHYROXINE SODIUM 75 MCG: 0.07 TABLET ORAL at 10:07

## 2022-01-01 RX ADMIN — CLOTRIMAZOLE 10 MG: 10 LOZENGE ORAL; TOPICAL at 01:29

## 2022-01-01 RX ADMIN — SODIUM CHLORIDE 1000 ML: 9 INJECTION, SOLUTION INTRAVENOUS at 02:32

## 2022-01-01 RX ADMIN — LEVOTHYROXINE SODIUM 75 MCG: 0.05 TABLET ORAL at 06:06

## 2022-01-01 RX ADMIN — Medication 10 ML: at 10:40

## 2022-01-01 RX ADMIN — GABAPENTIN 100 MG: 100 CAPSULE ORAL at 10:07

## 2022-01-01 RX ADMIN — HYDROXYCHLOROQUINE SULFATE 200 MG: 200 TABLET ORAL at 10:03

## 2022-01-01 RX ADMIN — GABAPENTIN 100 MG: 100 CAPSULE ORAL at 10:03

## 2022-01-01 RX ADMIN — ACETAMINOPHEN 650 MG: 325 TABLET ORAL at 21:18

## 2022-01-01 RX ADMIN — SODIUM CHLORIDE: 9 INJECTION, SOLUTION INTRAVENOUS at 10:07

## 2022-01-01 RX ADMIN — PANTOPRAZOLE SODIUM 40 MG: 40 TABLET, DELAYED RELEASE ORAL at 06:06

## 2022-01-01 RX ADMIN — AMLODIPINE BESYLATE 2.5 MG: 2.5 TABLET ORAL at 10:03

## 2022-01-01 RX ADMIN — POLYETHYLENE GLYCOL 3350 17 G: 17 POWDER, FOR SOLUTION ORAL at 10:04

## 2022-01-01 RX ADMIN — CLOTRIMAZOLE 10 MG: 10 LOZENGE ORAL; TOPICAL at 14:39

## 2022-01-01 RX ADMIN — MULTIPLE VITAMINS W/ MINERALS TAB 1 TABLET: TAB at 10:12

## 2022-01-01 RX ADMIN — ENOXAPARIN SODIUM 30 MG: 100 INJECTION SUBCUTANEOUS at 10:04

## 2022-01-01 RX ADMIN — LOSARTAN POTASSIUM 50 MG: 50 TABLET, FILM COATED ORAL at 10:07

## 2022-01-01 RX ADMIN — CLOTRIMAZOLE 10 MG: 10 LOZENGE ORAL; TOPICAL at 10:03

## 2022-01-01 RX ADMIN — CLOTRIMAZOLE 10 MG: 10 LOZENGE ORAL; TOPICAL at 05:29

## 2022-01-01 RX ADMIN — GABAPENTIN 100 MG: 100 CAPSULE ORAL at 14:39

## 2022-01-01 RX ADMIN — PANTOPRAZOLE SODIUM 40 MG: 40 TABLET, DELAYED RELEASE ORAL at 05:29

## 2022-01-01 RX ADMIN — POTASSIUM CHLORIDE AND SODIUM CHLORIDE: 900; 150 INJECTION, SOLUTION INTRAVENOUS at 21:23

## 2022-01-01 ASSESSMENT — PAIN DESCRIPTION - PAIN TYPE
TYPE: CHRONIC PAIN
TYPE: CHRONIC PAIN
TYPE: CHRONIC PAIN;ACUTE PAIN

## 2022-01-01 ASSESSMENT — ENCOUNTER SYMPTOMS
ABDOMINAL PAIN: 0
COUGH: 0
EYE REDNESS: 0
SORE THROAT: 0
NAUSEA: 0
VOMITING: 0
SHORTNESS OF BREATH: 0
NAUSEA: 0
EYE DISCHARGE: 0
EYE PAIN: 0
SHORTNESS OF BREATH: 0
BACK PAIN: 0
WHEEZING: 0
PHOTOPHOBIA: 0
SINUS PRESSURE: 0
SORE THROAT: 0
COUGH: 0
ABDOMINAL DISTENTION: 0
DIARRHEA: 0

## 2022-01-01 ASSESSMENT — PAIN SCALES - GENERAL
PAINLEVEL_OUTOF10: 3
PAINLEVEL_OUTOF10: 0
PAINLEVEL_OUTOF10: 9
PAINLEVEL_OUTOF10: 5
PAINLEVEL_OUTOF10: 8
PAINLEVEL_OUTOF10: 9
PAINLEVEL_OUTOF10: 9

## 2022-01-01 ASSESSMENT — PAIN DESCRIPTION - LOCATION
LOCATION: GENERALIZED

## 2022-01-01 ASSESSMENT — PAIN DESCRIPTION - ORIENTATION: ORIENTATION: OTHER (COMMENT)

## 2022-01-01 ASSESSMENT — PAIN - FUNCTIONAL ASSESSMENT: PAIN_FUNCTIONAL_ASSESSMENT: ACTIVITIES ARE NOT PREVENTED

## 2022-01-01 ASSESSMENT — PAIN DESCRIPTION - PROGRESSION: CLINICAL_PROGRESSION: NOT CHANGED

## 2022-01-01 ASSESSMENT — PAIN DESCRIPTION - ONSET: ONSET: GRADUAL

## 2022-01-01 ASSESSMENT — PAIN DESCRIPTION - DESCRIPTORS: DESCRIPTORS: ACHING

## 2022-01-01 NOTE — ED PROVIDER NOTES
80-year-old female presenting after a fall. Patient states she was walking and \"went down. \"  She does not know how she fell. She states she hit her head off the corner of the table. Does not think she lost consciousness. She complains of pain where she hit her head. She denies all other complaints. Sudden onset fall, now resolved, ems helped her up, no distress. Review of Systems   Constitutional: Negative for chills and fever. HENT: Negative for congestion and sore throat. Eyes: Negative for photophobia and visual disturbance. Respiratory: Negative for cough and shortness of breath. Cardiovascular: Negative for chest pain. Gastrointestinal: Negative for abdominal pain and nausea. Genitourinary: Negative for flank pain. Musculoskeletal: Negative for neck pain. Skin: Negative for rash and wound. Neurological: Positive for headaches. Negative for dizziness and light-headedness. All other systems reviewed and are negative. Physical Exam  Constitutional:       General: She is not in acute distress. Appearance: She is not ill-appearing. HENT:      Head: Normocephalic. Comments: 1.5 cm laceration right posterior scalp, no foreign body or tendon injury     Right Ear: External ear normal.      Left Ear: External ear normal.      Nose: Nose normal.   Eyes:      Extraocular Movements: Extraocular movements intact. Conjunctiva/sclera: Conjunctivae normal.      Pupils: Pupils are equal, round, and reactive to light. Cardiovascular:      Rate and Rhythm: Normal rate. Rhythm irregular. Heart sounds: Murmur heard. Pulmonary:      Effort: Pulmonary effort is normal.      Breath sounds: Normal breath sounds. Abdominal:      General: There is no distension. Palpations: Abdomen is soft. Tenderness: There is no abdominal tenderness. Musculoskeletal:         General: No swelling.       Comments: Skin tear left lower leg   Skin:     General: Skin is warm and dry. Neurological:      General: No focal deficit present. Mental Status: She is alert. Cranial Nerves: No cranial nerve deficit. Sensory: No sensory deficit. Motor: No weakness. Psychiatric:         Mood and Affect: Mood normal.         Behavior: Behavior normal.          Procedures       LACERATION REPAIR  PROCEDURE NOTE:  Unless otherwise indicated, this procedure was done or directly supervised by the ED attending. Laceration #: 1. Location: right posterior scalp  Length: 1.5 cm. The wound area was irrigated with sterile saline, cleansed with povidone iodine and draped in a sterile fashion. The wound area was anesthetized with Lidocaine 1% with epinephrine. WOUND COMPLEXITY:    Debridement: None. Undermining: None. Wound Margins Revised: None. Flaps Aligned: yes. The wound was explored with the following results no foreign body or tendon injury seen. The wound was closed with staples. Total number suture and staples: 2      MDM  Number of Diagnoses or Management Options  Laceration of scalp, initial encounter  Diagnosis management comments: 81 yo female presenting with head laceration after a fall. Family presented at bedside after initial evaluation, stated that patient fell while trying to get out of bed and that she frequently gets confused. On chart review, does not appear patient is on blood thinners and last tetanus was 2019. EKG and labs unremarkable. CT head and cervical spine negative for acute process. CXR and pelvis XR unremarkable. Scalp laceration was cleaned and washed out, closed with 2 staples. At this time, patient is felt stable for discharge back to nursing facility.  She was discharged with instructions to follow up with PCP in 7 days for staple removal.                    --------------------------------------------- PAST HISTORY ---------------------------------------------  Past Medical History:  has a past medical history of Abdominal tenderness of left lower quadrant, Angina pectoris ([address] Arthritis, Bacteremia, Bacterial cellulitis, Bite of animal, Cardiac dysrhythmia, Cellulitis and abscess of upper extremity, Cellulitis of forearm, Cellulitis of lower limb, Chest wall pain, Colitis, Cough, Degenerative joint disease involving multiple joints, Dizziness and giddiness, Edema of lower extremity, Flare of rheumatoid arthritis (HCC), Frozen shoulder, GERD (gastroesophageal reflux disease), Hip pain, left, History of blood transfusion, Hyperlipidemia, Hypertension, Hypotensive syncope, Hypothyroidism, Laceration with foreign body of other part of head, initial encounter, Left flank pain, Mild dehydration, Mucous membrane inflammation, Multiple fractures of ribs of right side, Musculoskeletal pain, Open bite wound of skin, Oral candidiasis, Orthostatic hypotension, Other specified joint disorders, right ankle and foot, Ovarian mass, Peptic ulcer disease, Peripheral vertigo, Rheumatoid arthritis(714.0), Right inguinal hernia, Senile osteoporosis, Spinal stenosis, lumbar region without neurogenic claudication, Spontaneous ecchymosis, Thyroid disease, and Varicose veins of lower extremity. Past Surgical History:  has a past surgical history that includes Carpal tunnel release; Cholecystectomy; joint replacement; Inguinal hernia repair (Right); knee surgery (Left, 1990); Elbow surgery (Left, 1998); Upper gastrointestinal endoscopy (N/A, 7/22/2020); and Colonoscopy (2007). Social History:  reports that she has never smoked. She has never used smokeless tobacco. She reports that she does not drink alcohol and does not use drugs. Family History: family history includes Heart Disease in her brother. The patients home medications have been reviewed.     Allergies: Levofloxacin, Bactrim [sulfamethoxazole-trimethoprim], Erythromycin, Amoxicillin, Celebrex [celecoxib], Levaquin [levofloxacin], Clinoril [sulindac], Codeine, and Morphine    -------------------------------------------------- RESULTS -------------------------------------------------  Labs:  Results for orders placed or performed during the hospital encounter of 01/01/22   CBC Auto Differential   Result Value Ref Range    WBC 9.2 4.5 - 11.5 E9/L    RBC 4.08 3.50 - 5.50 E12/L    Hemoglobin 12.9 11.5 - 15.5 g/dL    Hematocrit 41.3 34.0 - 48.0 %    .2 (H) 80.0 - 99.9 fL    MCH 31.6 26.0 - 35.0 pg    MCHC 31.2 (L) 32.0 - 34.5 %    RDW 14.3 11.5 - 15.0 fL    Platelets 114 448 - 729 E9/L    MPV 8.7 7.0 - 12.0 fL    Neutrophils % 69.2 43.0 - 80.0 %    Immature Granulocytes % 1.3 0.0 - 5.0 %    Lymphocytes % 14.8 (L) 20.0 - 42.0 %    Monocytes % 13.4 (H) 2.0 - 12.0 %    Eosinophils % 0.8 0.0 - 6.0 %    Basophils % 0.5 0.0 - 2.0 %    Neutrophils Absolute 6.38 1.80 - 7.30 E9/L    Immature Granulocytes # 0.12 E9/L    Lymphocytes Absolute 1.36 (L) 1.50 - 4.00 E9/L    Monocytes Absolute 1.23 (H) 0.10 - 0.95 E9/L    Eosinophils Absolute 0.07 0.05 - 0.50 E9/L    Basophils Absolute 0.05 0.00 - 0.20 V7/J   Basic Metabolic Panel w/ Reflex to MG   Result Value Ref Range    Sodium 133 132 - 146 mmol/L    Potassium reflex Magnesium 4.0 3.5 - 5.0 mmol/L    Chloride 96 (L) 98 - 107 mmol/L    CO2 21 (L) 22 - 29 mmol/L    Anion Gap 16 7 - 16 mmol/L    Glucose 70 (L) 74 - 99 mg/dL    BUN 20 6 - 23 mg/dL    CREATININE 1.1 (H) 0.5 - 1.0 mg/dL    GFR Non-African American 46 >=60 mL/min/1.73    GFR African American 56     Calcium 8.8 8.6 - 10.2 mg/dL   Troponin   Result Value Ref Range    Troponin, High Sensitivity 69 (H) 0 - 9 ng/L   Brain Natriuretic Peptide   Result Value Ref Range    Pro-BNP 3,735 (H) 0 - 450 pg/mL   Troponin   Result Value Ref Range    Troponin, High Sensitivity 66 (H) 0 - 9 ng/L       Radiology:  XR PELVIS (1-2 VIEWS)   Final Result   No evidence of hip fracture or pelvis fracture. XR CHEST 1 VIEW   Final Result   1. Minimal left lower lobe atelectasis, unchanged.    2. Sequela of chest trauma, advanced shoulder arthropathy         CT Head WO Contrast   Final Result   1. No acute intracranial abnormality. 2.  Underlying signs of cerebral and cerebellar      3. Signs of deep white matter small vessel disease      4. Chronic appearing right maxillary sinusitis      RECOMMENDATIONS:   Unavailable         CT Cervical Spine WO Contrast   Final Result   1. No sign of acute traumatic injury. 2. 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 3. Advanced mid and lower lumbar degenerative disc disease and spondylosis   with moderate levoscoliosis. RECOMMENDATIONS:   Unavailable           EKG: interpreted by me  Rate: 68  Rhythm: sinus  Interpretation: no acute ST-T changes  Comparison: changes compared to previous    ------------------------- NURSING NOTES AND VITALS REVIEWED ---------------------------  Date / Time Roomed:  1/1/2022 10:40 AM  ED Bed Assignment:  [place] [id number]    The nursing notes within the ED encounter and vital signs as below have been reviewed. BP (!) 149/75   Pulse 90   Temp 97.3 °F (36.3 °C) (Temporal)   Resp 20   SpO2 100%   Oxygen Saturation Interpretation: Normal      ------------------------------------------ PROGRESS NOTES ------------------------------------------    I have spoken with the patient and discussed todays results, in addition to providing specific details for the plan of care and counseling regarding the diagnosis and prognosis. Their questions are answered at this time and they are agreeable with the plan. I discussed at length with them reasons for immediate return here for re evaluation. They will followup with their primary care physician by calling their office on Monday.      --------------------------------- ADDITIONAL PROVIDER NOTES ---------------------------------  At this time the patient is without objective evidence of an acute process requiring hospitalization or inpatient management. They have remained hemodynamically stable throughout their entire ED visit and are stable for discharge with outpatient follow-up. The plan has been discussed in detail and they are aware of the specific conditions for emergent return, as well as the importance of follow-up. New Prescriptions    No medications on file       Diagnosis:  1. Laceration of scalp, initial encounter        Disposition:  Patient's disposition: Discharge to nursing home  Patient's condition is stable. Avery Elam MD  Resident  01/03/22 [id number]    ATTENDING PROVIDER ATTESTATION:     Ginny Aase presented to the emergency department for evaluation of Fall ([employer] fall trying to get out of bed, Pt hit head on bedside table, states she is having tailbone pain. no loc, no thinners. Pt from [facility] assisted living) and Laceration   and was initially evaluated by the Medical Resident. See Original ED Note for H&P and ED course above. I have reviewed and discussed the case, including pertinent history (medical, surgical, family and social) and exam findings with the Medical Resident assigned to Ginny Aase. I have personally performed and/or participated in the history, exam, medical decision making, and procedures and agree with all pertinent clinical information. I, Dr. Fabiola Moreno, am the primary provider of record       I have reviewed my findings and recommendations with the assigned Medical Resident, Ginny Aase and members of family present at the time of disposition. My findings/plan: The encounter diagnosis was Laceration of scalp, initial encounter.   Discharge Medication List as of 1/1/2022  2:46 PM        Noreene Dominion, DO       Noreene Dominion, DO  01/06/22 [id number]

## 2022-01-01 NOTE — ED NOTES
Report given to [facility], at this time.       Luigi Mendoza RN  01/01/22 0758

## 2022-01-05 PROBLEM — R00.1 SYMPTOMATIC BRADYCARDIA: Status: ACTIVE | Noted: 2022-01-01

## 2022-01-05 PROBLEM — R55 SYNCOPE AND COLLAPSE: Status: ACTIVE | Noted: 2022-01-01

## 2022-01-05 NOTE — ED NOTES
Bed: 13  Expected date:   Expected time:   Means of arrival:   Comments:     Bree Newsome RN  01/05/22 1048

## 2022-01-05 NOTE — CONSULTS
Department of Internal Medicine  Internal Medicine Consultation Note    Primary Care Physician: Jillian Rey DO   Admitting Physician: Dr. Imelda Castaeñda  Admission date and time: 1/5/2022 10:49 AM    Room:  13/13  Admitting diagnosis: Syncope    Patient Name: Jhoan Hughes  MRN: [id number]    Date of Service: 1/5/2022     Reason for consultation:  Syncope    HISTORY OF PRESENT ILLNESS:    Radha Pepe is a 31-year-old female patient who presented from the assisted living facility after an episode of syncope. Patient is a poor historian which thusly limits history collection. History is largely obtained from the ER physician. Per report of EMS, the patient had a syncopal episode that was unwitnessed and resulted in a minor head injury that required staples. ER work-up was unremarkable and unrevealing for the cause of syncope as she appears to be at or very near her baseline health state. However, per report during transfer the patient was bradycardic with heart rates in the 30s. EKG was not felt to be ischemic. Metabolic panel revealed patient to be at or very near her renal baseline with no significant electrolyte disturbance. proBNP was not significantly elevated at 2727. Hepatic function panel showed no significant abnormalities as well. CBC was grossly unremarkable as well aside from some macrocytosis. Chest x-ray shows partial ulceration left hemidiaphragm with questionable atelectasis. CT of the head without contrast showed no significant intracranial abnormality. High-sensitivity troponin has been ordered and is pending. I discussed the case with the ER physician, he will notify the primary care provider of the patient's condition for admission and consultation for internal medicine. Radha Pepe is seen and examined at bedside. As mentioned, she is alert, pleasant and cooperative but her degree of confusion prohibits accurate data collection. She voices no acute complaints.   She denies any headache despite head injury requiring staples. During my interview with the patient, her adult son presents to the bedside. He reports that she has had 3 separate syncopal episodes in the last week and a half or so that she has been at the assisted living. She was unresponsive on most recent occasion that he has concern. We have discussed the bradycardia arrhythmia. The patient's son admits that he would not be open to having a pacemaker placed in his mother but would like further work-up to determine the etiology. We have discussed the perils of hospitalization during the pandemic, limited bed availability and the likelihood of prolonged ER boarding and he is agreeable with patient.     PAST MEDICAL Hx:  Past Medical History:   Diagnosis Date    Abdominal tenderness of left lower quadrant     Angina pectoris (HCC)     Arthritis     Bacteremia     Bacterial cellulitis     Bite of animal     Cardiac dysrhythmia     Cellulitis and abscess of upper extremity     Cellulitis of forearm     Cellulitis of lower limb     Chest wall pain     Colitis     Cough     Degenerative joint disease involving multiple joints     Dizziness and giddiness     Edema of lower extremity     Flare of rheumatoid arthritis (HCC)     Frozen shoulder     GERD (gastroesophageal reflux disease)     Hip pain, left     History of blood transfusion     Hyperlipidemia     Hypertension     Hypotensive syncope     Hypothyroidism     Laceration with foreign body of other part of head, initial encounter     Left flank pain     Mild dehydration     Mucous membrane inflammation     Multiple fractures of ribs of right side     Musculoskeletal pain     Open bite wound of skin     Oral candidiasis     Orthostatic hypotension     Other specified joint disorders, right ankle and foot     Ovarian mass     Peptic ulcer disease     Peripheral vertigo     Rheumatoid arthritis(714.0)     Right inguinal hernia     Senile osteoporosis     Spinal stenosis, lumbar region without neurogenic claudication     Spontaneous ecchymosis     Thyroid disease     Varicose veins of lower extremity        PAST SURGICAL Hx:   Past Surgical History:   Procedure Laterality Date    CARPAL TUNNEL RELEASE      right    CHOLECYSTECTOMY      COLONOSCOPY  2007    Dr. Ping Sims Right     JOINT REPLACEMENT      Lt. Elbow,Lt. Knee    KNEE SURGERY Left 1990    UPPER GASTROINTESTINAL ENDOSCOPY N/A 7/22/2020    EGD BIOPSY performed by Castro Ward MD at [address] Hx:  Family History   Problem Relation Age of Onset    Heart Disease Brother        HOME MEDICATIONS:  Prior to Admission medications    Medication Sig Start Date End Date Taking?  Authorizing Provider   albuterol sulfate HFA (PROAIR HFA) 108 (90 Base) MCG/ACT inhaler Inhale 2 puffs into the lungs every 6 hours as needed for Wheezing 12/31/21 1/7/22  Tahir Finley, DO   Prenatal-FeCbn-FeAspGl-FA-Omeg (FOLCAPS OMEGA 3 PO) Take 1 tablet by mouth daily    Historical Provider, MD   Ferrous Sulfate (IRON PO) Take by mouth    Historical Provider, MD   furosemide (LASIX) 20 MG tablet Take 20 mg by mouth daily 1/2 TAB BY MOUTH    Historical Provider, MD   nitrofurantoin (MACRODANTIN) 100 MG capsule Take 100 mg by mouth 2 times daily    Historical Provider, MD   clotrimazole (MYCELEX) 10 MG trevin Take 10 mg by mouth every 4 hours    Historical Provider, MD   hydroxychloroquine (PLAQUENIL) 200 MG tablet Take 200 mg by mouth daily    Historical Provider, MD   zoledronic acid (RECLAST) 5 MG/100ML SOLN Infuse 5 mg intravenously once    Historical Provider, MD   acetaminophen (TYLENOL) 500 MG tablet Take 1 tablet by mouth 2 times daily as needed for Pain or Fever 2x daily 7/26/20   Di Arora MD   pantoprazole (PROTONIX) 40 MG tablet Take 1 tablet by mouth every morning (before breakfast) 7/27/20   Di Groves MD   losartan (COZAAR) 50 MG tablet Take 1 tablet by mouth daily Hold until systolic is greater then 142 7/26/20   Di Arora MD   metoprolol succinate (TOPROL XL) 25 MG extended release tablet Take 1 tablet by mouth daily 7/26/20   Di Arora MD   gabapentin (NEURONTIN) 100 MG capsule Take 100 mg by mouth 2 times daily at 0800 and 1400. Historical Provider, MD   gabapentin (NEURONTIN) 300 MG capsule Take 300 mg by mouth nightly. Historical Provider, MD   polyethylene glycol (GLYCOLAX) powder Take 17 g by mouth daily    Historical Provider, MD   predniSONE (DELTASONE) 2.5 MG tablet Take 2.5 mg by mouth daily    Historical Provider, MD   Omega-3 Fatty Acids (FISH OIL) 1000 MG CAPS Take 1,000 mg by mouth daily    Historical Provider, MD   Multiple Vitamins-Minerals (OCUVITE ADULT 50+ PO) Take 1 tablet by mouth daily    Historical Provider, MD   levothyroxine (SYNTHROID) 75 MCG tablet Take 75 mcg by mouth Daily. Historical Provider, MD   Vitamin D (CHOLECALCIFEROL) 1000 UNITS CAPS capsule Take 1,000 Units by mouth daily. Historical Provider, MD       ALLERGIES:  Levofloxacin, Bactrim [sulfamethoxazole-trimethoprim], Erythromycin, Amoxicillin, Celebrex [celecoxib], Levaquin [levofloxacin], Clinoril [sulindac], Codeine, and Morphine    SOCIAL Hx:  Social History     Socioeconomic History    Marital status:       Spouse name: Not on file    Number of children: Not on file    Years of education: Not on file    Highest education level: Not on file   Occupational History    Not on file   Tobacco Use    Smoking status: Never Smoker    Smokeless tobacco: Never Used   Vaping Use    Vaping Use: Never used   Substance and Sexual Activity    Alcohol use: No    Drug use: No    Sexual activity: Not on file   Other Topics Concern    Not on file   Social History Narrative    Not on file     Social Determinants of Health     Financial Resource Strain:     Difficulty of Paying Living Expenses: Not on file Food Insecurity:     Worried About Running Out of Food in the Last Year: Not on file    Reynaldo of Food in the Last Year: Not on file   Transportation Needs:     Lack of Transportation (Medical): Not on file    Lack of Transportation (Non-Medical): Not on file   Physical Activity:     Days of Exercise per Week: Not on file    Minutes of Exercise per Session: Not on file   Stress:     Feeling of Stress : Not on file   Social Connections:     Frequency of Communication with Friends and Family: Not on file    Frequency of Social Gatherings with Friends and Family: Not on file    Attends Worship Services: Not on file    Active Member of [address] [place] [address] [employer] or Organizations: Not on file    Attends Club or Organization Meetings: Not on file    Marital Status: Not on file   Intimate Partner Violence:     Fear of Current or Ex-Partner: Not on file    Emotionally Abused: Not on file    Physically Abused: Not on file    Sexually Abused: Not on file   Housing Stability:     Unable to Pay for Housing in the Last Year: Not on file    Number of [place] in the Last Year: Not on file    Unstable Housing in the Last Year: Not on file       ROS: Limited due to confusion  General:   Denies fever or chills. Positive for acute on chronic fatigue and malaise    Psychological:   Denies anxiety, disorientation or hallucinations    ENT:    Denies epistaxis, headaches, vertigo or visual changes    Cardiovascular:   Denies chest pains or palpitations. Extensive cardiovascular history. No paroxysmal nocturnal dyspnea. Respiratory:   Denies shortness of breath at rest or on exertion. Gastrointestinal:   Denies nausea, vomiting, diarrhea, or constipation. Positive for somewhat poor appetite and poor intake. Denies any abdominal pain. Denies change in bowel habits or stools. Genito-Urinary:    Voices no irritative voiding symptoms but does have a history of recurrent UTI.     Musculoskeletal:   No significant joint pain or swelling reported    Neurology:    Positive for confusion, chronically hard of hearing, denies any acute focal neurological deficits. Derm:    Positive for scalp laceration due to fall, otherwise denies any rashes, ulcers, or excoriations. Denies bruising. Extremities:   Denies any lower extremity swelling or edema. PHYSICAL EXAM:  VITALS:  Vitals:    01/05/22 1315   BP: 114/67   Pulse: 77   Resp:    Temp:    SpO2: 98%         CONSTITUTIONAL:    Awake, alert, pleasant and cooperative. Confused. Chronically ill. EYES:    PERRL, EOMI, sclera clear without icterus, conjunctiva normal    ENT:    Normocephalic, atraumatic, sinuses nontender on palpation. External ears without lesions. Oral pharynx with moist mucus membranes. NECK:    Supple, symmetrical, trachea midline, no adenopathy, thyroid symmetric, not enlarged and no tenderness, skin normal, no bruits, no JVD    HEMATOLOGIC/LYMPHATICS:    No cervical lymphadenopathy and no supraclavicular lymphadenopathy    LUNGS:    Symmetric. No increased work of breathing, mildly diminished air exchange, clear to auscultation bilaterally, no wheezes, rhonchi, or rales,     CARDIOVASCULAR:    Normal apical impulse, somewhat irregular rhythm with controlled rate, E0-P0, systolic murmur    ABDOMEN:    Normal contour, normal bowel sounds, soft, non-distended, non-tender, no masses palpated, no hepatosplenomegaly, no rebound or guarding elicited on palpation     MUSCULOSKELETAL:    There is no redness, warmth, or swelling of the joints. Tone is normal.    NEUROLOGIC:    Awake, alert, oriented to person, and somewhat oriented to place that she can identify she is in the hospital.  Disoriented to time. SKIN:    Scalp laceration is not directly visualized. Otherwise, no bruising or bleeding. No redness, warmth, or swelling    EXTREMITIES:    Peripheral pulses present. No edema, cyanosis, or swelling.     LABORATORY DATA:  CBC with Differential:    Lab Results   Component Value Date    WBC 8.4 01/05/2022    RBC 3.85 01/05/2022    HGB 12.4 01/05/2022    HCT 39.2 01/05/2022     01/05/2022    .8 01/05/2022    MCH 32.2 01/05/2022    MCHC 31.6 01/05/2022    RDW 14.6 01/05/2022    SEGSPCT 85 03/28/2011    METASPCT 0.9 01/05/2022    LYMPHOPCT 2.6 01/05/2022    MONOPCT 6.1 01/05/2022    BASOPCT 0.1 01/05/2022    MONOSABS 0.50 01/05/2022    LYMPHSABS 0.25 01/05/2022    EOSABS 0.00 01/05/2022    BASOSABS 0.00 01/05/2022     BMP:    Lab Results   Component Value Date     01/05/2022    K 4.8 01/05/2022    CL 96 01/05/2022    CO2 26 01/05/2022    BUN 27 01/05/2022    LABALBU 3.8 01/05/2022    LABALBU 3.9 03/28/2011    CREATININE 1.3 01/05/2022    CALCIUM 8.8 01/05/2022    GFRAA 46 01/05/2022    LABGLOM 38 01/05/2022    GLUCOSE 86 01/05/2022    GLUCOSE 101 03/28/2011       ASSESSMENT:  · Syncope with reported bradycardia dysrhythmia  · Chronic kidney disease stage IIIb-IV  · Closed head injury with scalp laceration requiring staples  · Rheumatoid arthritis  · Hypertension  · Hyperlipidemia  · Hypothyroidism    PLAN:  So Vidal presented with syncope that was precipitated by bradycardia dysrhythmia. Further work-up will be undertaken and, as per my discussion with her adult son, they are not interested in pacemaker placement if bradycardia dysrhythmia is identified as a precipitator. Echocardiogram will be obtained at the request of her son and we will rule out additional causes of arrhythmia and syncope. If patient will tolerate, MRI will be obtained as well as carotid ultrasound. Urinalysis will be assessed fluids will be provided x24 hours. Orthostatic vital signs will be obtained and telemetry monitoring will be utilized. Underlying co-morbidites will be addressed during hospitalization as well. Labs and vital signs will be monitored closely and addressed accordingly. See additional orders for details.      Due to extremely high hospital inpatient census and bed limitations, along with staff shortages, we have had a dee discussion with the patient/family regarding the likelihood of prolonged ER boarding status and potential delays/disruptions in care related to this. They understand and agree to maintain hospitalization at this facility while accepting these risks. We have made every attempt to coordinate care with the ER nursing staff as well to avoid any disruptions in care.       ANAYELI Oswald CNP  1:44 PM  1/5/2022    Electronically signed by ANAYELI Oswald CNP on 1/5/22 at 1:44 PM EST

## 2022-01-05 NOTE — ED PROVIDER NOTES
Olu Johnson is a 80 y.o. female with a PMHx significant for HTN, GERD who presents for evaluation of syncopal event, beginning prior to arrival.  The complaint has been intermittent, mild in severity, and worsened by nothing. The patient arrives via EMS for syncopal episode while at home. Patient does not recollect any of these events. She is awake, alert, oriented to person, place, time. Notes that she had just some things building up at home. Per EMS the patient was found to be bradycardic in the 30s however has improved up to the 70s. Patient denies hitting her head she has had a fall recently and has 2 staples on the right side of her scalp. The patient Nu[place] any dizziness, lightness, chest pain measures breath, nausea, vomiting, diarrhea. The history is provided by the patient and medical records. Review of Systems   Constitutional: Negative for chills and fever. HENT: Negative for ear pain, sinus pressure and sore throat. Eyes: Negative for pain, discharge and redness. Respiratory: Negative for cough, shortness of breath and wheezing. Cardiovascular: Negative for chest pain. Gastrointestinal: Negative for abdominal distention, diarrhea, nausea and vomiting. Genitourinary: Negative for dysuria and frequency. Musculoskeletal: Negative for arthralgias and back pain. Skin: Negative for rash and wound. Neurological: Positive for syncope. Negative for weakness and headaches. Hematological: Negative for adenopathy. All other systems reviewed and are negative. Physical Exam  Vitals and nursing note reviewed. Constitutional:       General: She is not in acute distress. Appearance: Normal appearance. She is well-developed. She is not ill-appearing. HENT:      Head: Normocephalic and atraumatic. Right Ear: External ear normal.      Left Ear: External ear normal.   Eyes:      General:         Right eye: No discharge. Left eye: No discharge. Extraocular Movements: Extraocular movements intact. Conjunctiva/sclera: Conjunctivae normal.   Cardiovascular:      Rate and Rhythm: Normal rate and regular rhythm. Heart sounds: Normal heart sounds. Pulmonary:      Effort: Pulmonary effort is normal. No respiratory distress. Breath sounds: Normal breath sounds. No stridor. Abdominal:      General: There is no distension. Palpations: Abdomen is soft. There is no mass. Tenderness: There is no abdominal tenderness. Hernia: No hernia is present. Musculoskeletal:         General: No swelling, tenderness or deformity. Normal range of motion. Cervical back: Normal range of motion and neck supple. Skin:     General: Skin is warm. Coloration: Skin is not jaundiced or pale. Findings: No rash. Neurological:      General: No focal deficit present. Mental Status: She is alert and oriented to person, place, and time. Cranial Nerves: No cranial nerve deficit. Coordination: Coordination normal.          Procedures     [facility]     ED Course as of 01/10/22 0218   Wed Jan 05, 2022   1118 EKG: This EKG is signed and interpreted by me. Rate: 88  Rhythm: Sinus  Interpretation: Nonspecific EKG, no supervisions, ST depressions, nonspecific T wave changes, , QRS 80, QTc 467  Comparison: changes compared to previous EKG    [BB]      ED Course User Index  [BB] Calvin Sellar, DO Emogene Sandifer presents to the ED for evaluation of syncope and collapse. The patient had similar episode a few days prior. EMS found the patient to be bradycardic. Workup in the ED revealed labs within normal limits, troponin of 56 which decreased to 47. Thomas Lemuel EKG is nonspecific without evidence of arrhythmia.  Patient requires continued workup and management of their symptoms and will be admitted to the hospital for further evaluation and treatment.      --------------------------------------------- PAST HISTORY ---------------------------------------------  Past Medical History:  has a past medical history of Abdominal tenderness of left lower quadrant, Angina pectoris ([facility] [address] Arthritis, Bacteremia, Bacterial cellulitis, Bite of animal, Cardiac dysrhythmia, Cellulitis and abscess of upper extremity, Cellulitis of forearm, Cellulitis of lower limb, Chest wall pain, Colitis, Cough, Degenerative joint disease involving multiple joints, Dizziness and giddiness, Edema of lower extremity, Flare of rheumatoid arthritis ([facility] [address] Frozen shoulder, GERD (gastroesophageal reflux disease), Hip pain, left, History of blood transfusion, Hyperlipidemia, Hypertension, Hypotensive syncope, Hypothyroidism, Laceration with foreign body of other part of head, initial encounter, Left flank pain, Mild dehydration, Mucous membrane inflammation, Multiple fractures of ribs of right side, Musculoskeletal pain, Open bite wound of skin, Oral candidiasis, Orthostatic hypotension, Other specified joint disorders, right ankle and foot, Ovarian mass, Peptic ulcer disease, Peripheral vertigo, Rheumatoid arthritis(714.0), Right inguinal hernia, Senile osteoporosis, Spinal stenosis, lumbar region without neurogenic claudication, Spontaneous ecchymosis, Thyroid disease, and Varicose veins of lower extremity. Past Surgical History:  has a past surgical history that includes Carpal tunnel release; Cholecystectomy; joint replacement; Inguinal hernia repair (Right); knee surgery (Left, 1990); Elbow surgery (Left, 1998); Upper gastrointestinal endoscopy (N/A, 7/22/2020); and Colonoscopy (2007). Social History:  reports that she has never smoked. She has never used smokeless tobacco. She reports that she does not drink alcohol and does not use drugs. Family History: family history includes Heart Disease in her brother. The patients home medications have been reviewed.     Allergies: Levofloxacin, Bactrim [sulfamethoxazole-trimethoprim], Erythromycin, Amoxicillin, Celebrex [celecoxib], Levaquin [levofloxacin], Clinoril [sulindac], Codeine, and Morphine    -------------------------------------------------- RESULTS -------------------------------------------------    LABS:  Results for orders placed or performed during the hospital encounter of 01/05/22   Culture, Urine    Specimen: Urine, clean catch   Result Value Ref Range    Urine Culture, Routine       10 to 100,000 CFU/mL  Mixed aury isolated. Further workup and sensitivity testing  is not routinely indicated and will not be performed.   Mixed aury isolated includes:  Gram negative rods  Oxidase positive gram negative rods  Gram positive organism     CBC Auto Differential   Result Value Ref Range    WBC 8.4 4.5 - 11.5 E9/L    RBC 3.85 3.50 - 5.50 E12/L    Hemoglobin 12.4 11.5 - 15.5 g/dL    Hematocrit 39.2 34.0 - 48.0 %    .8 (H) 80.0 - 99.9 fL    MCH 32.2 26.0 - 35.0 pg    MCHC 31.6 (L) 32.0 - 34.5 %    RDW 14.6 11.5 - 15.0 fL    Platelets 581 633 - 142 E9/L    MPV 9.1 7.0 - 12.0 fL    Neutrophils % 90.4 (H) 43.0 - 80.0 %    Lymphocytes % 2.6 (L) 20.0 - 42.0 %    Monocytes % 6.1 2.0 - 12.0 %    Eosinophils % 0.5 0.0 - 6.0 %    Basophils % 0.1 0.0 - 2.0 %    Neutrophils Absolute 7.64 (H) 1.80 - 7.30 E9/L    Lymphocytes Absolute 0.25 (L) 1.50 - 4.00 E9/L    Monocytes Absolute 0.50 0.10 - 0.95 E9/L    Eosinophils Absolute 0.00 (L) 0.05 - 0.50 E9/L    Basophils Absolute 0.00 0.00 - 0.20 E9/L    Metamyelocytes Relative 0.9 0.0 - 1.0 %    Polychromasia 1+     Poikilocytes 1+     [place] Cells 1+     Ovalocytes 1+    Comprehensive Metabolic Panel w/ Reflex to MG   Result Value Ref Range    Sodium 133 132 - 146 mmol/L    Potassium reflex Magnesium 4.8 3.5 - 5.0 mmol/L    Chloride 96 (L) 98 - 107 mmol/L    CO2 26 22 - 29 mmol/L    Anion Gap 11 7 - 16 mmol/L    Glucose 86 74 - 99 mg/dL    BUN 27 (H) 6 - 23 mg/dL    CREATININE 1.3 (H) 0.5 - 1.0 mg/dL    GFR Non-African American 38 >=60 mL/min/1.73    GFR African American 46     Calcium 8.8 8.6 - 10.2 mg/dL    Total Protein 6.6 6.4 - 8.3 g/dL    Albumin 3.8 3.5 - 5.2 g/dL    Total Bilirubin 0.7 0.0 - 1.2 mg/dL    Alkaline Phosphatase 146 (H) 35 - 104 U/L    ALT 14 0 - 32 U/L    AST 32 (H) 0 - 31 U/L   Brain Natriuretic Peptide   Result Value Ref Range    Pro-BNP 2,727 (H) 0 - 450 pg/mL   Troponin   Result Value Ref Range    Troponin, High Sensitivity 56 (H) 0 - 9 ng/L   SPECIMEN REJECTION   Result Value Ref Range    Rejected Test trp5     Reason for Rejection see below    Urinalysis with Microscopic   Result Value Ref Range    Color, UA Yellow Straw/Yellow    Clarity, UA SLCLOUDY Clear    Glucose, Ur Negative Negative mg/dL    Bilirubin Urine Negative Negative    Ketones, Urine Negative Negative mg/dL    Specific Gravity, UA 1.020 1.005 - 1.030    Blood, Urine Negative Negative    pH, UA 6.5 5.0 - 9.0    Protein, UA TRACE Negative mg/dL    Urobilinogen, Urine 2.0 (A) <2.0 E.U./dL    Nitrite, Urine Negative Negative    Leukocyte Esterase, Urine MODERATE (A) Negative    WBC, UA 5-10 (A) 0 - 5 /HPF    RBC, UA 0-1 0 - 2 /HPF    Epithelial Cells, UA MODERATE /HPF    Bacteria, UA FEW (A) None Seen /HPF   Troponin   Result Value Ref Range    Troponin, High Sensitivity 47 (H) 0 - 9 ng/L   Basic Metabolic Panel   Result Value Ref Range    Sodium 132 132 - 146 mmol/L    Potassium 4.3 3.5 - 5.0 mmol/L    Chloride 99 98 - 107 mmol/L    CO2 22 22 - 29 mmol/L    Anion Gap 11 7 - 16 mmol/L    Glucose 72 (L) 74 - 99 mg/dL    BUN 27 (H) 6 - 23 mg/dL    CREATININE 1.1 (H) 0.5 - 1.0 mg/dL    GFR Non-African American 46 >=60 mL/min/1.73    GFR African American 56     Calcium 8.5 (L) 8.6 - 10.2 mg/dL   CBC Auto Differential   Result Value Ref Range    WBC 5.2 4.5 - 11.5 E9/L    RBC 3.95 3.50 - 5.50 E12/L    Hemoglobin 12.4 11.5 - 15.5 g/dL    Hematocrit 38.9 34.0 - 48.0 %    MCV 98.5 80.0 - 99.9 fL    MCH 31.4 26.0 - 35.0 pg    MCHC 31.9 (L) 32.0 - 34.5 %    RDW 14.0 11.5 - 15.0 fL    Platelets 296 130 - 450 E9/L    MPV 8.8 7.0 - 12.0 fL    Neutrophils % 62.5 43.0 - 80.0 %    Immature Granulocytes % 1.0 0.0 - 5.0 %    Lymphocytes % 22.9 20.0 - 42.0 %    Monocytes % 12.6 (H) 2.0 - 12.0 %    Eosinophils % 0.6 0.0 - 6.0 %    Basophils % 0.4 0.0 - 2.0 %    Neutrophils Absolute 3.23 1.80 - 7.30 E9/L    Immature Granulocytes # 0.05 E9/L    Lymphocytes Absolute 1.18 (L) 1.50 - 4.00 E9/L    Monocytes Absolute 0.65 0.10 - 0.95 E9/L    Eosinophils Absolute 0.03 (L) 0.05 - 0.50 E9/L    Basophils Absolute 0.02 0.00 - 0.20 E9/L   Hemoglobin A1C   Result Value Ref Range    Hemoglobin A1C 5.2 4.0 - 5.6 %   Lipid Panel   Result Value Ref Range    Cholesterol, Total 246 (H) 0 - 199 mg/dL    Triglycerides 144 0 - 149 mg/dL    HDL 60 >40 mg/dL    LDL Calculated 157 (H) 0 - 99 mg/dL    VLDL Cholesterol Calculated 29 mg/dL   Magnesium   Result Value Ref Range    Magnesium 2.3 1.6 - 2.6 mg/dL   T4, Free   Result Value Ref Range    T4 Free 0.99 0.93 - 1.70 ng/dL   TSH without Reflex   Result Value Ref Range    TSH 4.080 0.270 - 4.200 uIU/mL   Hepatic Function Panel   Result Value Ref Range    Total Protein 6.1 (L) 6.4 - 8.3 g/dL    Albumin 3.4 (L) 3.5 - 5.2 g/dL    Alkaline Phosphatase 133 (H) 35 - 104 U/L    ALT 11 0 - 32 U/L    AST 23 0 - 31 U/L    Total Bilirubin 0.7 0.0 - 1.2 mg/dL    Bilirubin, Direct 0.3 0.0 - 0.3 mg/dL    Bilirubin, Indirect 0.4 0.0 - 1.0 mg/dL   EKG 12 Lead   Result Value Ref Range    Ventricular Rate 76 BPM    Atrial Rate 76 BPM    P-R Interval 144 ms    QRS Duration 66 ms    Q-T Interval 398 ms    QTc Calculation (Bazett) 447 ms    P Axis 90 degrees    R Axis -13 degrees    T Axis 54 degrees       RADIOLOGY:  US CAROTID ARTERY BILATERAL   Final Result   The right internal carotid artery demonstrates 0-50% stenosis. The left internal carotid artery demonstrates 0-50% stenosis. Right vertebral artery not visible, left appears patent with normal direction   of flow.       RECOMMENDATIONS: Unavailable         CT Head WO Contrast   Final Result   No acute intracranial abnormality. XR CHEST PORTABLE   Final Result   Partial obscuration of left hemidiaphragm suggesting left lower lobe   atelectasis or pneumonia. Difficult to exclude small left pleural effusion.             ------------------------- NURSING NOTES AND VITALS REVIEWED ---------------------------  Date / Time Roomed:  1/5/2022 10:49 AM  ED Bed Assignment:  [id number]    The nursing notes within the ED encounter and vital signs as below have been reviewed. No data found. Oxygen Saturation Interpretation: Normal    ------------------------------------------ PROGRESS NOTES ------------------------------------------  Counseling:  I have spoken with the patient and discussed todays results, in addition to providing specific details for the plan of care and counseling regarding the diagnosis and prognosis. Their questions are answered at this time and they are agreeable with the plan of admission.    --------------------------------- ADDITIONAL PROVIDER NOTES ---------------------------------  Consultations:  Time: 5991. Spoke with Dr. Trudy Mishra. Discussed case. They will admit the patient. This patient's ED course included: a personal history and physicial examination, re-evaluation prior to disposition, multiple bedside re-evaluations, IV medications, cardiac monitoring, continuous pulse oximetry and complex medical decision making and emergency management    This patient has remained hemodynamically stable during their ED course. Diagnosis:  1. Syncope and collapse        Disposition:  Patient's disposition: Admit to telemetry  Patient's condition is stable.            Alexys Kothari DO  01/10/22 0221

## 2022-01-06 PROBLEM — N18.2 STAGE 2 CHRONIC KIDNEY DISEASE: Status: ACTIVE | Noted: 2022-01-01

## 2022-01-06 NOTE — CARE COORDINATION
ADDENDUM; 1/6/2022.12:01 PM Discharge order noted. Arranged Physician Ambulette transfer back to [facility] [address] [place] [address] in [place] today at 4:30 pm, as son called in and stated he cannot transport. Please call nurse to nurse at [phone]. Pt aware of discharge, nursing and facility notified, vm left for son. JAMEL French    Ss note:1/6/2022 10:36 AM Pt is from [address] [place] [address] in [place]. Discharge order noted. GLADYS spoke with Sheila Brito at [phone] pt can return today, will need a nurse to nurse called prior to discharge. SW will arrange transport for later today as son called in and stated family cannot transport. Pt has staples in head, are these to be removed prior to discharge? Nursing notified.  JAMEL French

## 2022-01-06 NOTE — PLAN OF CARE
Problem: Falls - Risk of:  Goal: Will remain free from falls  Description: Will remain free from falls  Outcome: Met This Shift     Problem: Falls - Risk of:  Goal: Absence of physical injury  Description: Absence of physical injury  Outcome: Met This Shift     Problem: Pain:  Goal: Pain level will decrease  Description: Pain level will decrease  Outcome: Met This Shift     Problem: Pain:  Goal: Control of chronic pain  Description: Control of chronic pain  Outcome: Met This Shift

## 2022-01-06 NOTE — DISCHARGE INSTR - COC
Patient Infection Status       Infection Onset Added Last Indicated Last Indicated By Review Planned Expiration Resolved Resolved By    None active    Resolved    COVID-19 (Rule Out) 07/26/20 07/26/20 07/26/20 COVID-19 (Ordered)   07/26/20 Rule-Out Test Resulted            Nurse Assessment:  Last Vital Signs: BP (!) 180/100   Pulse 101   Temp 98 °F (36.7 °C) (Axillary)   Resp 20   Ht 5' (1.524 m)   Wt 112 lb (50.8 kg)   SpO2 95%   BMI 21.87 kg/m²     Last documented pain score (0-10 scale): Pain Level: 9  Last Weight:   Wt Readings from Last 1 Encounters:   01/05/22 112 lb (50.8 kg)     Mental Status:  {IP PT MENTAL STATUS:20030}    IV Access:  { CEASAR IV ACCESS:254686140}    Nursing Mobility/ADLs:  Walking   {P DME BUEX:739398811}  Transfer  {[facility] DME RVMP:113678491}  Bathing  {[facility] DME JEFM:021078009}  Dressing  {P DME FMVF:406851641}  Toileting  {P DME GGCS:089482181}  Feeding  {[facility] DME QSAZ:338548630}  Med Admin  {[facility] DME HHJJ:823426994}  Med Delivery   { CEAS[facility] MED Delivery:266903269}    Wound Care Documentation and Therapy:        Elimination:  Continence: Bowel: {YES / AV:93299}  Bladder: {YES / ZK:08474}  Urinary Catheter: {Urinary Catheter:737156492}   Colostomy/Ileostomy/Ileal Conduit: {YES / MI:10427}       Date of Last BM: ***    Intake/Output Summary (Last 24 hours) at 1/6/2022 0946  Last data filed at 1/6/2022 0908  Gross per 24 hour   Intake 60 ml   Output --   Net 60 ml     No intake/output data recorded.     Safety Concerns:     [employer] Safety Concerns:248632103}    Impairments/Disabilities:      [employer] Impairments/Disabilities:616426897}    Nutrition Therapy:  Current Nutrition Therapy:   [employer] Diet List:281828731}    Routes of Feeding: {[facility] DME Other Feedings:366181690}  Liquids: {Slp liquid thickness:29497}  Daily Fluid Restriction: {[facility]P DME Yes amt example:285438110}  Last Modified Barium Swallow with Video (Video Swallowing Test): {Done Not Done JACQUIEZ:532232911}    Treatments at the Time of Hospital Discharge:   Respiratory Treatments: ***  Oxygen Therapy:  {Therapy; copd oxygen:08291}  Ventilator:    {[facility] Vent BJO}    Rehab Therapies: {THERAPEUTIC INTERVENTION:5651982337}  Weight Bearing Status/Restrictions: {[facility] Weight Bearin}  Other Medical Equipment (for information only, NOT a DME order):  {EQUIPMENT:987209294}  Other Treatments: ***    Patient's personal belongings (please select all that are sent with patient):  {[facility] DME Belongings:102785374}    RN SIGNATURE:  {Esignature:590865560}    CASE MANAGEMENT/SOCIAL WORK SECTION    Inpatient Status Date: ***    Readmission Risk Assessment Score:  Readmission Risk              Risk of Unplanned Readmission:  0           Discharging to Facility/ Agency   Name:   Address:  Phone:  Fax:    Dialysis Facility (if applicable)   Name:  Address:  Dialysis Schedule:  Phone:  Fax:    / signature: {Esignature:454797514}    PHYSICIAN SECTION    Prognosis: {Prognosis:6840061685}    Condition at Discharge: [address] [place] [address] Patient Condition:237389821}    Rehab Potential (if transferring to Rehab): {Prognosis:1786818575}    Recommended Labs or Other Treatments After Discharge: ***    Physician Certification: I certify the above information and transfer of Scotty Mancera  is necessary for the continuing treatment of the diagnosis listed and that she requires {Admit to Appropriate Level of Care:63561} for {GREATER/LESS:859010260} 30 days.      Update Admission H&P: {[facility] DME Changes in BLSX}    PHYSICIAN SIGNATURE:  Electronically signed by Casey Reynoso DO on 22 at 9:46 AM EST

## 2022-01-06 NOTE — PROGRESS NOTES
Physical Therapy Initial Evaluation/Plan of Care    Room #:  9573/2480-98  Patient Name: Deepak Grimm  YOB: 1929  MRN: [id number]    Date of Service: 1/6/2022     Tentative placement recommendation: [address] vs. Subacute if goals are not met  Equipment recommendation: To be determined      Evaluating Physical Therapist: Frank Hopson #[id number]      Specific Provider Orders/Date/Referring Provider :   01/05/22 1400   PT evaluation and treat Start: 01/05/22 1400, End: 01/05/22 1400, ONE TIME, Standing Count: 1 Occurrences, R    Last continued at transfer on Wed Jan 5, 2022 8:26 PM   Ganesh Hurst DO      Admitting Diagnosis:   Syncope and collapse [R55]      Surgery: none      Patient Active Problem List   Diagnosis    Osteomyelitis of toe of right foot [facility] [address]    Multiple rib fractures    RA (rheumatoid arthritis) ([facility] [address]    Closed fracture of nasal bone    Closed fracture of right hand    Closed fracture of maxillary sinus (HCC)    Anemia    Essential hypertension    Gastric ulcer    Symptomatic bradycardia    Syncope and collapse    Stage 2 chronic kidney disease        ASSESSMENT of Current Deficits Patient exhibits decreased strength, balance and endurance impairing functional mobility, transfers and gait . Patient confused at time of evaluation. Patient able to stand and ambulated back to bed. Patient was sitting up in chair for hours before evaluation, patient sleeping in chair. Patient assisted back to bed, short shuffled steps with wheeled walker. The patient will benefit from continued skilled therapy to increase strength and improve balance for safe functional mobility, to decrease risk of falls, and to meet goals at discharge.         PHYSICAL THERAPY  PLAN OF CARE       Physical therapy plan of care is established based on physician order,  patient diagnosis and clinical assessment    Current Treatment Recommendations:    -Bed Mobility: Lower extremity exercises -Sitting Balance: Incorporate reaching activities to activate trunk muscles   -Standing Balance: Perform strengthening exercises in standing to promote motor control with or without upper extremity support   -Transfers: Provide instruction on proper hand and foot position for adequate transfer of weight onto lower extremities and use of gait device if needed and Cues for hand placement, technique and safety. Provide stabilization to prevent fall   -Gait: Gait training and Standing activities to improve: base of support, weight shift, weight bearing      PT long term treatment goals are located in below grid    Patient and or family understand(s) diagnosis, prognosis, and plan of care. Frequency of treatments: Patient will be seen  3-5 times/week.          Prior Level of Function: Patient ambulated with wheeled walker   Rehab Potential: good  for baseline    Past medical history:   Past Medical History:   Diagnosis Date    Abdominal tenderness of left lower quadrant     Angina pectoris (HCC)     Arthritis     Bacteremia     Bacterial cellulitis     Bite of animal     Cardiac dysrhythmia     Cellulitis and abscess of upper extremity     Cellulitis of forearm     Cellulitis of lower limb     Chest wall pain     Colitis     Cough     Degenerative joint disease involving multiple joints     Dizziness and giddiness     Edema of lower extremity     Flare of rheumatoid arthritis (HCC)     Frozen shoulder     GERD (gastroesophageal reflux disease)     Hip pain, left     History of blood transfusion     Hyperlipidemia     Hypertension     Hypotensive syncope     Hypothyroidism     Laceration with foreign body of other part of head, initial encounter     Left flank pain     Mild dehydration     Mucous membrane inflammation     Multiple fractures of ribs of right side     Musculoskeletal pain     Open bite wound of skin     Oral candidiasis     Orthostatic hypotension     Other specified joint disorders, right ankle and foot     Ovarian mass     Peptic ulcer disease     Peripheral vertigo     Rheumatoid arthritis(714.0)     Right inguinal hernia     Senile osteoporosis     Spinal stenosis, lumbar region without neurogenic claudication     Spontaneous ecchymosis     Thyroid disease     Varicose veins of lower extremity      Past Surgical History:   Procedure Laterality Date    CARPAL TUNNEL RELEASE      right    CHOLECYSTECTOMY      COLONOSCOPY  2007    Dr. Ana Cristina Swo. Elbow,Lt. Knee    KNEE SURGERY Left 1990    UPPER GASTROINTESTINAL ENDOSCOPY N/A 7/22/2020    EGD BIOPSY performed by Daniel Michel MD at [address]:    Precautions: Up with assistance, falls and confusion , shuffled steps  Social history: Patient lives at [facility] [address]: unknown,      [address]   How much difficulty turning over in bed?: A Little  How much difficulty sitting down on / standing up from a chair with arms?: A Lot  How much difficulty moving from lying on back to sitting on side of bed?: A Little  How much help from another person moving to and from a bed to a chair?: A Lot  How much help from another person needed to walk in hospital room?: A Lot  How much help from another person for climbing 3-5 steps with a railing?: A Lot  AM-PAC Inpatient Mobility Raw Score : 14  AM-PAC Inpatient T-Scale Score : 38.1  Mobility Inpatient CMS 0-100% Score: 61.29  Mobility Inpatient CMS G-Code Modifier : CL    Nursing cleared patient for PT evaluation. The admitting diagnosis and active problem list as listed above have been reviewed prior to the initiation of this evaluation. OBJECTIVE;   Initial Evaluation  Date: 1/6/2022 Treatment Date:     Short Term/ Long Term   Goals   Was pt agreeable to Eval/treatment?  Yes  To be met in 5 days   Pain level  pain in shoulders      Bed Mobility    Rolling: Not assessed patient in chair    Supine to sit: Not assessed patient in chair    Sit to supine: Moderate assist of  2    Scooting: Not assessed patient in chair    Rolling: Supervision     Supine to sit: Supervision     Sit to supine: Supervision     Scooting: Supervision      Transfers Sit to stand: Minimal assist of 2 from chair   Sit to stand: Minimal assist of 1    Ambulation    4 feet using  wheeled walker with Minimal assist of 2   cues for sequencing, upright posture, safety and obstacle negotiation   25 feet using  wheeled walker with Minimal assist of 1    ROM Within functional limits       Strength BUE:  refer to OT eval  RLE:  4-/5  LLE:  4-/5  Increase strength in affected mm groups by 1/3 grade   Balance Sitting EOB:  not assessed   Dynamic Standing:  fair - wheeled walker    Sitting EOB:  good   Dynamic Standing: good      Patient is Alert & Oriented x person and follows one step directions    Sensation:  Patient  denies numbness/tingling   Edema:  no   Endurance: poor    Vitals: room air   Blood Pressure at rest  Blood Pressure during session    Heart Rate at rest  Heart Rate during session    SPO2 at rest %  SPO2 during session %     Patient education  Patient educated on role of Physical Therapy, risks of immobility, safety and plan of care,  importance of mobility while in hospital , ankle pumps, quad set and glut set for edema control, blood clot prevention and importance and purpose of adaptive device and adjusted to proper height for the patient. Patient response to education:   Pt verbalized understanding Pt demonstrated skill Pt requires further education in this area   Yes Partial Yes      Treatment:  Patient practiced and was instructed/facilitated in the following treatment: Patient in chair at start of session. Seated exercises. Pt stood from chair, cues for hand placement. Patient ambulated by beside to sit down EOB. Patient assisted back to supine. Heel protectors placed on and assisted max assist x2 to [facility]. Therapeutic Exercises:  ankle pumps and long arc quad  x 10 reps. At end of session, patient in bed with OT present call light and phone within reach,  all lines and tubes intact, nursing notified. Patient would benefit from continued skilled Physical Therapy to improve functional independence and quality of life. Patient's/ family goals   Return to [address]    Time in  1125  Time out  1144    Total Treatment Time  0 minutes    Evaluation time includes thorough review of current medical information, gathering information on past medical history/social history and prior level of function, completion of standardized testing/informal observation of tasks, assessment of data, and development of Plan of care and goals.      CPT codes:  Low Complexity PT evaluation (97548)  No treatment    Harshal Acuna, PT

## 2022-01-06 NOTE — DISCHARGE SUMMARY
Internal Medicine Progress Note     ANNA=[facility]     Tiana Seo. Lary Rosenthal., CASSIE.ISABELLA.THAIS.O.I. Erma Govea D.O., ARABELLAOAILIN Rachel D.O. Darryl Brito, MSN, APRN, NP-C  Kristi Freeman. [place], [place], [id number] [facility]       Internal Medicine  Discharge Summary    NAME: Valentino Starring  :  1929  MRN:  [id number]  Jt [id number]  ADMITTED: 2022      DISCHARGED: 22    ADMITTING PHYSICIAN: Mathew Vale DO    CONSULTANT(S):   IP CONSULT TO SOCIAL WORK  IP CONSULT TO INTERNAL MEDICINE     ADMITTING DIAGNOSIS:   Syncope and collapse [R55]     DISCHARGE DIAGNOSES:   1. Syncope with reported bradycardia dysrhythmia  2. Chronic kidney disease stage IIIb-IV  3. Closed head injury with scalp laceration requiring staples  4. Rheumatoid arthritis  5. Hypertension  6. Hyperlipidemia  7. Hypothyroidism    BRIEF HISTORY OF PRESENT ILLNESS:   Shruti is a 49-year-old female patient who presented from the assisted living facility after an episode of syncope. Patient is a poor historian which thusly limits history collection. History is largely obtained from the ER physician. Per report of EMS, the patient had a syncopal episode that was unwitnessed and resulted in a minor head injury that required staples. ER work-up was unremarkable and unrevealing for the cause of syncope as she appears to be at or very near her baseline health state. However, per report during transfer the patient was bradycardic with heart rates in the 30s. EKG was not felt to be ischemic. Metabolic panel revealed patient to be at or very near her renal baseline with no significant electrolyte disturbance. proBNP was not significantly elevated at 2727. Hepatic function panel showed no significant abnormalities as well. CBC was grossly unremarkable as well aside from some macrocytosis. Chest x-ray shows partial ulceration left hemidiaphragm with questionable atelectasis.   CT of the head without contrast showed no significant intracranial abnormality. High-sensitivity troponin has been ordered and is pending. I discussed the case with the ER physician, he will notify the primary care provider of the patient's condition for admission and consultation for internal medicine.     Chilango Gibbs is seen and examined at bedside. As mentioned, she is alert, pleasant and cooperative but her degree of confusion prohibits accurate data collection. She voices no acute complaints. She denies any headache despite head injury requiring staples. During my interview with the patient, her adult son presents to the bedside. He reports that she has had 3 separate syncopal episodes in the last week and a half or so that she has been at the assisted living. She was unresponsive on most recent occasion that he has concern. We have discussed the bradycardia arrhythmia. The patient's son admits that he would not be open to having a pacemaker placed in his mother but would like further work-up to determine the etiology. We have discussed the perils of hospitalization during the pandemic, limited bed availability and the likelihood of prolonged ER boarding and he is agreeable with patient.     LABS[de-identified]  Lab Results   Component Value Date    WBC 5.2 01/06/2022    HGB 12.4 01/06/2022    HCT 38.9 01/06/2022     01/06/2022     01/06/2022    K 4.3 01/06/2022    CL 99 01/06/2022    CREATININE 1.1 (H) 01/06/2022    BUN 27 (H) 01/06/2022    CO2 22 01/06/2022    GLUCOSE 72 (L) 01/06/2022    ALT 11 01/06/2022    AST 23 01/06/2022    INR 1.0 12/31/2021     Lab Results   Component Value Date    INR 1.0 12/31/2021    INR 1.0 07/21/2020    INR 1.0 12/16/2017    PROTIME 11.6 12/31/2021    PROTIME 12.0 07/21/2020    PROTIME 11.2 12/16/2017      Lab Results   Component Value Date    TSH 4.080 01/06/2022     Lab Results   Component Value Date    TRIG 144 01/06/2022    TRIG 194 (H) 06/22/2021    TRIG 64 07/20/2016     Lab Results Component Value Date    HDL 60 01/06/2022    HDL 81 06/22/2021     07/20/2016     Lab Results   Component Value Date    LDLCALC 157 (H) 01/06/2022    LDLCALC 140 (H) 06/22/2021    [address] 07/20/2016     Lab Results   Component Value Date    LABA1C 5.2 07/20/2016       IMAGING:  Echo Complete    Result Date: 1/5/2022  Transthoracic Echocardiography Report (TTE)  Demographics   Patient Name      Belen Eric  Gender              Female                    A   Medical Record    [id number]      Room Number         13  Number   Account #         [de-identified]     Procedure Date      01/05/2022   Corporate ID                    Ordering Physician   Accession Number  [id number]    Referring Physician   Date of Birth     08/04/1929    Sonographer         Kaleb Wellington RDCS   Age               80 year(s)    Interpreting        Abigail Mcintosh DO                                  Physician                                   Any Other  Procedure Type of Study   TTE procedure  Procedure Date Date: 01/05/2022 Start: 02:21 PM Study Location: Echo Lab Technical Quality: Adequate visualization Indications:Syncope and Bradycardia. Patient Status: Routine Height: 60 inches Weight: 112 pounds BSA: 1.46 m^2 BMI: 21.87 kg/m^2 Rhythm: Within normal limits HR: 83 bpm  Findings   Left Ventricle  Left ventricular size is grossly normal.  No evidence of left ventricular mass or thrombus noted. Normal left ventricular wall thickness. Ejection fraction is visually estimated at 68%. No evidence of left ventricular mass or thrombus noted. Right Ventricle  Normal right ventricular size and function. Left Atrium  The left atrium is mild-moderately dilated. Interatrial septum appears intact. Right Atrium  Normal right atrium size. Mitral Valve  Physiologic and/or trace mitral regurgitation is present. No evidence of mitral valve stenosis.   Mitral annular calcification is present. Tricuspid Valve  Physiologic and/or trace tricuspid regurgitation. RVSP is 33 mmHg. Pulmonary hypertension is mild . Aortic Valve  Aortic valve opens well. The aortic valve is trileaflet. No evidence of aortic valve regurgitation. No hemodynamically significant aortic stenosis is present. Pulmonic Valve  The pulmonic valve was not well visualized. Pericardial Effusion  No evidence of pericardial effusion. Aorta  The aorta is within normal limits. Miscellaneous  Regular rhythm. Conclusions   Summary  Left ventricular size is grossly normal.  No evidence of left ventricular mass or thrombus noted. Normal left ventricular wall thickness. Ejection fraction is visually estimated at 68%. No evidence of left ventricular mass or thrombus noted. The left atrium is mild-moderately dilated. Interatrial septum appears intact. Physiologic and/or trace mitral regurgitation is present. No evidence of mitral valve stenosis. Mitral annular calcification is present. Physiologic and/or trace tricuspid regurgitation. RVSP is 33 mmHg. Pulmonary hypertension is mild . The pulmonic valve was not well visualized. Regular rhythm.    Signature   ----------------------------------------------------------------  Electronically signed by Gal Dumont DO(Interpreting  physician) on 01/05/2022 06:27 PM  ----------------------------------------------------------------  M-Mode/2D Measurements & Calculations   LV Diastolic    LV Systolic Dimension: 2.1  AV Cusp Separation: 1.7 cmLA  Dimension: 3.4  cm                          Dimension: 2.7 cmAO Root  cm              LV Volume Diastolic: 46 ml  Dimension: 2.6 cm  LV FS:38.2 %    LV Volume Systolic: 09.4 ml  LV PW           LV EDV/LV EDV Index: 46  Diastolic: 1 cm MX/32 OE/M^8VX ESV/LV ESV  Septum          Index: 14.9 ml/10ml/ m^2    RV Diastolic Dimension: 2.2 cm  Diastolic: 1 cm EF Calculated: 67.6 %  CO: 5 l/min     LV Mass Index: 68 l/min*m^2  CI: 3.42 LA volume/Index: 65.5 ml  l/m*m^2  LV Mass: 98.91  LVOT: 1.8 cm  g  Doppler Measurements & Calculations   MV Peak E-Wave: 0.8  AV Peak Velocity: 2    LVOT Peak Velocity: 1.12 m/s  m/s                  m/s                    LVOT Mean Velocity: 0.89 m/s  MV Peak A-Wave: 0.96 AV Peak Gradient:      LVOT Peak Gradient: 5 mmHgLVOT  m/s                  15.98 mmHg             Mean Gradient: 3.4 mmHg  MV E/A Ratio: 0.84   AV Mean Velocity: 1.38  MV Peak Gradient:    m/s  4.2 mmHg             AV Mean Gradient: 9.2  MV Mean Gradient:    mmHg                   TR Velocity:2.66 m/s  1.7 mmHg             AV VTI: 33 cm          TR Gradient:28.34 mmHg  MV Mean Velocity:    AV Area                PV Peak Velocity: 1.19 m/s  0.61 m/s             (Continuity):1.83 cm^2 PV Peak Gradient: 5.63 mmHg  MV Deceleration                             PV Mean Velocity: 0.91 m/s  Time: 298.5 msec     LVOT VTI: 23.7 cm      PV Mean Gradient: 3.5 mmHg  MV P1/2t: 90.3 msec  IVRT: 100.3 msec  MVA by PHT:2.44 cm^2  MV Area  (continuity): 2.4  cm^2  [email or web address] [id number]    XR CHEST (2 VW)    Result Date: 12/31/2021  EXAMINATION: TWO XRAY VIEWS OF THE CHEST 12/31/2021 11:37 am COMPARISON: October 9, 2020 HISTORY: ORDERING SYSTEM PROVIDED HISTORY: syncope TECHNOLOGIST PROVIDED HISTORY: Reason for exam:->syncope FINDINGS: Stable cardiomegaly. Opacities are present in lung bases on lateral view which silhouette costophrenic sulcus. No pneumothorax. Chronic interstitial changes bilaterally. 1. Lateral view shows findings opacities in bilateral costophrenic sulci which could indicate pleural effusions. Short-term follow-up may be helpful for further evaluation. 2. Stable cardiomegaly.      XR PELVIS (1-2 VIEWS)    Result Date: 1/1/2022  EXAMINATION: ONE XRAY VIEW OF THE PELVIS 1/1/2022 11:44 am COMPARISON: February 17, 2019 HISTORY: ORDERING SYSTEM PROVIDED HISTORY: fall TECHNOLOGIST PROVIDED HISTORY: Reason for exam:->fall FINDINGS: Generalized osteopenia. No evidence of pelvis fracture. No fracture involving right or left hip. No evidence of hip dislocation. Significant degenerative endplate changes involving visualized lumbar spine. No evidence of hip fracture or pelvis fracture. CT Head WO Contrast    Result Date: 1/5/2022  EXAMINATION: CT OF THE HEAD WITHOUT CONTRAST  1/5/2022 12:54 pm TECHNIQUE: CT of the head was performed without the administration of intravenous contrast. Dose modulation, iterative reconstruction, and/or weight based adjustment of the mA/kV was utilized to reduce the radiation dose to as low as reasonably achievable. COMPARISON: 4 days HISTORY: ORDERING SYSTEM PROVIDED HISTORY: syncope TECHNOLOGIST PROVIDED HISTORY: Reason for exam:->syncope Has a \"code stroke\" or \"stroke alert\" been called? ->No Decision Support Exception - unselect if not a suspected or confirmed emergency medical condition->Emergency Medical Condition (MA) FINDINGS: BRAIN/VENTRICLES: Ventricular system is prominent, may represent normal pressure hydrocephalus or be due to the volume loss. There is moderate to severe chronic small vessel ischemic white matter disease. No acute hemorrhage or mass effect. ORBITS: The visualized portion of the orbits demonstrate no acute abnormality. SINUSES: Near complete opacification of the right maxillary sinus is similar to previously SOFT TISSUES/SKULL:  No acute abnormality of the visualized skull or soft tissues. No acute intracranial abnormality.      CT Head WO Contrast    Result Date: 1/1/2022  EXAMINATION: CT OF THE HEAD WITHOUT CONTRAST  1/1/2022 11:40 am TECHNIQUE: CT of the head was performed without the administration of intravenous contrast. Dose modulation, iterative reconstruction, and/or weight based adjustment of the mA/kV was utilized to reduce the radiation dose to as low as reasonably achievable. COMPARISON: None. HISTORY: ORDERING SYSTEM PROVIDED HISTORY: fall TECHNOLOGIST PROVIDED HISTORY: Reason for exam:->fall Has a \"code stroke\" or \"stroke alert\" been called? ->No Decision Support Exception - unselect if not a suspected or confirmed emergency medical condition->Emergency Medical Condition (MA) FINDINGS: BRAIN/VENTRICLES: There is no acute intracranial hemorrhage, mass effect or midline shift. No abnormal extra-axial fluid collection. The gray-white differentiation is maintained without evidence of an acute infarct. There is no evidence of hydrocephalus. There is dilation of the ventricles and sulci compatible underlying signs of cerebral and cerebellar atrophy. ORBITS: The visualized portion of the orbits demonstrate no acute abnormality. There signs of bilateral lens replacement SINUSES: The visualized paranasal sinuses reveal opacification of the maxillary sinus suggests chronic sinusitis. The mastoid air cells demonstrate no acute abnormality. SOFT TISSUES/SKULL:  No acute abnormality of the visualized skull or soft tissues. 1.  No acute intracranial abnormality. 2.  Underlying signs of cerebral and cerebellar 3. Signs of deep white matter small vessel disease 4. Chronic appearing right maxillary sinusitis RECOMMENDATIONS: Unavailable     CT HEAD WO CONTRAST    Result Date: 12/31/2021  EXAMINATION: CT OF THE HEAD WITHOUT CONTRAST  12/31/2021 11:20 am TECHNIQUE: CT of the head was performed without the administration of intravenous contrast. Dose modulation, iterative reconstruction, and/or weight based adjustment of the mA/kV was utilized to reduce the radiation dose to as low as reasonably achievable. COMPARISON: 07/21/2020 HISTORY: ORDERING SYSTEM PROVIDED HISTORY: Trauma TECHNOLOGIST PROVIDED HISTORY: Has a \"code stroke\" or \"stroke alert\" been called? ->No Reason for exam:->Trauma Decision Support Exception - unselect if not a suspected or confirmed emergency medical condition->Emergency Medical Condition (MA) FINDINGS: BRAIN/VENTRICLES: There is no acute intracranial hemorrhage, mass effect or midline shift. No abnormal extra-axial fluid collection. There is extensive periventricular low-density that suggest microangiopathy. There is dilation of the ventricles and sulci that could represent underlying signs of cerebral and cerebellar atrophy. The gray-white differentiation is maintained without evidence of an acute infarct. There is no evidence of hydrocephalus. ORBITS: The visualized portion of the orbits demonstrate no acute abnormality. SINUSES: The visualized paranasal sinuses reveal an air-fluid level within the right maxillary sinus suggests acute sinusitis. Mastoid air cells demonstrate no acute abnormality. SOFT TISSUES/SKULL:  No acute abnormality of the visualized skull or soft tissues. 1.  No acute intracranial abnormality. 2.  Prominence of the ventricles and sulci suggestive underlying signs of cerebral and cerebellar atrophy. 3.  Evidence of deep white matter small vessel disease 4. Acute right maxillary sinusitis RECOMMENDATIONS: Unavailable     CT Cervical Spine WO Contrast    Result Date: 1/1/2022  EXAMINATION: CT OF THE CERVICAL SPINE WITHOUT CONTRAST 1/1/2022 10:40 am TECHNIQUE: CT of the cervical spine was performed without the administration of intravenous contrast. Multiplanar reformatted images are provided for review. Dose modulation, iterative reconstruction, and/or weight based adjustment of the mA/kV was utilized to reduce the radiation dose to as low as reasonably achievable. COMPARISON: None. HISTORY: ORDERING SYSTEM PROVIDED HISTORY: fall TECHNOLOGIST PROVIDED HISTORY: Reason for exam:->fall Decision Support Exception - unselect if not a suspected or confirmed emergency medical condition->Emergency Medical Condition (MA) FINDINGS: BONES/ALIGNMENT: There is moderate cervical levoscoliosis.   There is mild basal invagination of the dens into the foramen magnum which appears chronic. 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 C3 retrolisthesis 3 mm relative to C2 and C4. There are no signs of acute fracture or dislocation. The visualized portions of the skull base and mastoid sinuses are normal. DEGENERATIVE CHANGES:  Moderate degenerative disc narrowing and spondylosis is present at C3-4 through C6-7. The facet joints are moderately degenerated and hypertrophied. There is moderate degenerative remodeling of the atlantoaxial joint. There is moderate to marked right and moderate left mid and lower cervical foraminal narrowing. SOFT TISSUES: The visualized brain is unremarkable aside from moderate volume loss. The paravertebral soft tissues are normal.  The nasopharynx, oropharynx and hypopharynx appear normal.  There is focal fibrosis and calcification of the lung apices, likely reflecting fibrotic sequela of granulomatous disease. 1.  No sign of acute traumatic injury. 2. 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 3. Advanced mid and lower lumbar degenerative disc disease and spondylosis with moderate levoscoliosis. RECOMMENDATIONS: Unavailable     XR CHEST PORTABLE    Result Date: 1/5/2022  EXAMINATION: ONE XRAY VIEW OF THE CHEST 1/5/2022 11:26 am COMPARISON: Multiple priors, most recent from January 1, 2022. HISTORY: ORDERING SYSTEM PROVIDED HISTORY: syncope TECHNOLOGIST PROVIDED HISTORY: Reason for exam:->syncope FINDINGS: Heart size is unable to be accurately assessed on this single portable view of the chest, but appears to be stable. There is partial obscuration of left hemidiaphragm, suggesting left lower lobe atelectasis or pneumonia. Otherwise, no evidence of a focal airspace opacity. Difficult to exclude a small left pleural effusion. No pneumothorax. Bones are diffusely osteopenic.      Partial obscuration of left hemidiaphragm suggesting left lower lobe atelectasis or pneumonia. Difficult to exclude small left pleural effusion. XR CHEST 1 VIEW    Result Date: 1/1/2022  EXAMINATION: ONE XRAY VIEW OF THE CHEST 1/1/2022 10:44 am COMPARISON: 12/31/2021 HISTORY: ORDERING SYSTEM PROVIDED HISTORY: fall TECHNOLOGIST PROVIDED HISTORY: Reason for exam:->fall FINDINGS: There are healed rib fractures bilaterally. There is marked narrowing and degenerative remodeling of the shoulders reflecting degenerative/inflammatory arthropathy and rotator cuff degeneration. The heart and mediastinum are normal for AP technique. There is a small sliding type hiatal hernia. Lung volumes are normal.  There is minimal opacity in the left base medially, likely atelectasis. There are small, equivocal pleural effusions. 1. Minimal left lower lobe atelectasis, unchanged. 2. Sequela of chest trauma, advanced shoulder arthropathy     US CAROTID ARTERY BILATERAL    Result Date: 1/5/2022  EXAMINATION: ULTRASOUND EVALUATION OF THE CAROTID ARTERIES 1/5/2022 TECHNIQUE: Duplex ultrasound using B-mode/gray scaled imaging, Doppler spectral analysis and color flow Doppler was obtained of the carotid arteries. COMPARISON: 12/02/2013 HISTORY: ORDERING SYSTEM PROVIDED HISTORY: syncope TECHNOLOGIST PROVIDED HISTORY: Reason for exam:->syncope What reading provider will be dictating this exam?->CRC FINDINGS: RIGHT: The right common carotid artery demonstrates peak systolic velocities of 68, 55 cm/sec in the proximal and distal segments respectively. The right internal carotid artery demonstrates the systolic velocities of 43, 51, 58 cm/sec in the proximal, mid and distal segments respectively. Vertebral artery not well visualized. Mild atherosclerotic plaque. ICA/CCA ratio of 0.8 LEFT: The left common carotid artery demonstrates peak systolic velocities of 712, 44 cm/sec in the proximal and distal segments respectively.  The left internal carotid artery demonstrates the systolic velocities of 51, 52, 46 cm/sec in the proximal, mid and distal segments respectively. The external carotid artery is patent. The vertebral artery demonstrates normal antegrade flow. No evidence of focal atherosclerotic plaque. ICA/CCA ratio of 0.5     The right internal carotid artery demonstrates 0-50% stenosis. The left internal carotid artery demonstrates 0-50% stenosis. Right vertebral artery not visible, left appears patent with normal direction of flow. RECOMMENDATIONS: Unavailable         HOSPITAL COURSE:   Merlinda Nest presented to the hospital after suffering syncopal episodes while at the assisted living facility. She was found to be bradycardic at that point. Home beta-blocker therapy has been discontinued at this point. Family does not wish to pursue any additional work-up as she is DNR CCA including the possibility of pacemaker. Her heart rate responded accordingly to the discontinuation of beta-blocker therapy. She became ambulatory worked with the therapy teams. Neurontin has also been deescalated her age and with her underlying bradycardia. Otherwise, she has returned to her normal state of health and is acceptable for discharge back to the assisted living facility. Also to note, throughout the hospitalization she underwent additional syncopal work-up that included carotid ultrasound which was normal, CT of the head which was normal, and 2D echocardiogram that indicated appropriate cardiac function and no evidence of aortic stenosis. BRIEF PHYSICAL EXAMINATION AND LABORATORIES ON DAY OF DISCHARGE:  VITALS:  BP (!) 180/100   Pulse 101   Temp 98 °F (36.7 °C) (Axillary)   Resp 20   Ht 5' (1.524 m)   Wt 112 lb (50.8 kg)   SpO2 95%   BMI 21.87 kg/m²     HEENT:  PERRLA. EOMI. Sclera clear. Buccal mucosa moist.    Neck:  Supple. Trachea midline. No thyromegaly. No JVD. No bruits. Heart:  Rhythm regular, rate controlled. Systolic murmur. Lungs:  Symmetrical. Clear to auscultation bilaterally.   No wheezes. No rhonchi. No rales. Abdomen: Soft. Non-tender. Non-distended. Bowel sounds positive. No organomegaly or masses. No pain on palpation    Extremities:  Peripheral pulses present. No peripheral edema. No ulcers. Neurologic:  Alert x 3. No focal deficit. Cranial nerves grossly intact. Skin:  No petechia. No hemorrhage. No wounds. DISPOSITION:  The patient's condition is good. At this time the patient is without objective evidence of an acute process requiring continuing hospitalization or inpatient management. They are stable for discharge with outpatient follow-up. I have spoken with the patient and discussed the results of the current hospitalization, in addition to providing specific details for the plan of care and counseling regarding the diagnosis and prognosis. The plan has been discussed in detail and they are aware of the specific conditions for emergent return, as well as the importance of follow-up. Their questions are answered at this time and they are agreeable with the plan for discharge to assisted living.      DISCHARGE MEDICATIONS:   Current Discharge Medication List           Details   albuterol sulfate HFA (PROAIR HFA) 108 (90 Base) MCG/ACT inhaler Inhale 2 puffs into the lungs every 6 hours as needed for Wheezing  Qty: 18 g, Refills: 0      Prenatal-FeCbn-FeAspGl-FA-Omeg (FOLCAPS OMEGA 3 PO) Take 1 tablet by mouth daily      Ferrous Sulfate (IRON PO) Take by mouth      furosemide (LASIX) 20 MG tablet Take 20 mg by mouth daily 1/2 TAB BY MOUTH      clotrimazole (MYCELEX) 10 MG trevin Take 10 mg by mouth every 4 hours      hydroxychloroquine (PLAQUENIL) 200 MG tablet Take 200 mg by mouth daily      zoledronic acid (RECLAST) 5 MG/100ML SOLN Infuse 5 mg intravenously once      acetaminophen (TYLENOL) 500 MG tablet Take 1 tablet by mouth 2 times daily as needed for Pain or Fever 2x daily  Qty: 120 tablet, Refills: 3      pantoprazole (PROTONIX) 40 MG tablet Take 1 tablet by mouth every morning (before breakfast)  Qty: 30 tablet, Refills: 3      losartan (COZAAR) 50 MG tablet Take 1 tablet by mouth daily Hold until systolic is greater then 175  Qty: 30 tablet, Refills: 3      gabapentin (NEURONTIN) 100 MG capsule Take 100 mg by mouth 2 times daily at 0800 and 1400. polyethylene glycol (GLYCOLAX) powder Take 17 g by mouth daily      predniSONE (DELTASONE) 2.5 MG tablet Take 2.5 mg by mouth daily      Omega-3 Fatty Acids (FISH OIL) 1000 MG CAPS Take 1,000 mg by mouth daily      Multiple Vitamins-Minerals (OCUVITE ADULT 50+ PO) Take 1 tablet by mouth daily      levothyroxine (SYNTHROID) 75 MCG tablet Take 75 mcg by mouth Daily. Vitamin D (CHOLECALCIFEROL) 1000 UNITS CAPS capsule Take 1,000 Units by mouth daily. FOLLOW UP/INSTRUCTIONS:  · This patient is instructed to follow-up with her primary care physician. · Patient is instructed to follow-up with the consults listed above as directed by them. · she is instructed to resume home medications and take new medications as indicated in the list above. · If the patient has a recurrence of symptoms, she is instructed to go to the ED. Preparing for this patient's discharge, including paperwork, orders, instructions, and meeting with patient did require > 40 minutes.     Franklyn Cook DO   1/6/2022  9:47 AM

## 2022-01-06 NOTE — PROGRESS NOTES
[address] [facility]  [facility] [place]        Date:2022                                                  Patient Name: Scotty Mancera    MRN: [id number]    : 1929    Room: [facility]1683-74      Evaluating OT: Yusra Craig OTR/L; [id number]     Referring Provider and Specific Provider Orders/Date:      22 1400  OT eval and treat  Start:  22 1400,   End:  22 1400,   ONE TIME,   Standing Count:  1 Occurrences,   R        Last continued at transfer on   8:26 PM    Collins Montgomery DO     Placement Recommendation: assisted with assistance for transfers vs. Subacute if goals are not met        Diagnosis:   1.  Syncope and collapse         Surgery: none       Pertinent Medical History:       Past Medical History:   Diagnosis Date    Abdominal tenderness of left lower quadrant     Angina pectoris (HCC)     Arthritis     Bacteremia     Bacterial cellulitis     Bite of animal     Cardiac dysrhythmia     Cellulitis and abscess of upper extremity     Cellulitis of forearm     Cellulitis of lower limb     Chest wall pain     Colitis     Cough     Degenerative joint disease involving multiple joints     Dizziness and giddiness     Edema of lower extremity     Flare of rheumatoid arthritis (HCC)     Frozen shoulder     GERD (gastroesophageal reflux disease)     Hip pain, left     History of blood transfusion     Hyperlipidemia     Hypertension     Hypotensive syncope     Hypothyroidism     Laceration with foreign body of other part of head, initial encounter     Left flank pain     Mild dehydration     Mucous membrane inflammation     Multiple fractures of ribs of right side     Musculoskeletal pain     Open bite wound of skin     Oral candidiasis     Orthostatic hypotension     Other specified joint disorders, right ankle and foot     Ovarian mass     Peptic ulcer disease     Peripheral vertigo     Rheumatoid arthritis(714.0)     Right inguinal hernia     Senile osteoporosis     Spinal stenosis, lumbar region without neurogenic claudication     Spontaneous ecchymosis     Thyroid disease     Varicose veins of lower extremity          Past Surgical History:   Procedure Laterality Date    CARPAL TUNNEL RELEASE      right    CHOLECYSTECTOMY      COLONOSCOPY  2007    Dr. Juan Luis Clemons. Elbow,Lt. Knee    KNEE SURGERY Left 1990    UPPER GASTROINTESTINAL ENDOSCOPY N/A 7/22/2020    EGD BIOPSY performed by Rea Ferguson MD at [facility] ENDOSCOPY      Precautions:  Fall Risk     Assessment of current deficits    [x] Functional mobility  [x]ADLs  [x] Strength               [x]Cognition    [x] Functional transfers   [x] IADLs         [x] Safety Awareness   [x]Endurance    [] Fine Coordination              [x] Balance      [] Vision/perception   []Sensation     []Gross Motor Coordination  [x] ROM  [] Delirium                   [] Motor Control     OT PLAN OF CARE   OT POC based on physician orders, patient diagnosis and results of clinical assessment    Frequency/Duration 1-3 days/wk for 2 weeks PRN     Specific OT Treatment Interventions to include:   * Instruction/training on adapted ADL techniques and AE recommendations to increase functional independence within precautions       * Training on energy conservation strategies, correct breathing pattern and techniques to improve independence/tolerance for self-care routine  * Functional transfer/mobility training/DME recommendations for increased independence, safety, and fall prevention  * Patient/Family education to increase follow through with safety techniques and functional independence  * Recommendation of environmental modifications for increased safety with functional transfers/mobility and ADLs  * Therapeutic exercise to improve motor endurance, ROM, and functional strength for ADLs/functional transfers  * Therapeutic activities to facilitate/challenge dynamic balance, stand tolerance for increased safety and independence with ADLs  * Positioning to improve skin integrity, interaction with environment and functional independence    Recommended Adaptive Equipment: none, pt has all equipment needed      Home Living: in an [facility] [address] owned: per past hospitalization pt has a wheeled walker, cane, shower chair, grab bars, elevated toilet    Prior Level of Function: Independent with ADLs , Independent with IADLs; ambulated wheeled walker    Driving: no  Occupation: retired     Pain Level: pt states her arms hurt; Nursing notified. Cognition: A&O: 1/4; Follows 1 step directions   Memory: poor   Sequencing: fair    Problem solving: fair minus    Judgement/safety: fair minus     [facility]   AM-PAC Daily Activity Inpatient   How much help for putting on and taking off regular lower body clothing?: A Lot  How much help for Bathing?: A Lot  How much help for Toileting?: A Lot  How much help for putting on and taking off regular upper body clothing?: A Lot  How much help for taking care of personal grooming?: A Little  How much help for eating meals?: A Little  AM-[facility] Inpatient Daily Activity Raw Score: 14  AM-PAC Inpatient ADL T-Scale Score : 33.39  ADL Inpatient CMS 0-100% Score: 59.67  ADL Inpatient CMS G-Code Modifier : CK     Functional Assessment:    Initial Eval Status  Date: 1/6/22 Treatment Status  Date: STGs = LTGs  Time frame: 10-14 days   Feeding Set up provided. Assistance for container management and to place condiments on food. Supervision with increased time to bring food to mouth.    Independent    Grooming Minimal Assist   Independent    UB Dressing Moderate Assist   Supervision    LB Dressing Maximal Assist  Supervision    Bathing Maximal Assist  Minimal Assist    Toileting Maximal Assist   Supervision    Bed Mobility  Supine to sit: N/T as pt was up in chair   Sit to supine: Moderate Assist x 2   Supine to sit: Independent   Sit to supine: Independent    Functional Transfers Minimal Assist x 2 from bedside chair to wheeled walker  Transfer training with verbal cues for hand placement throughout session to improve safety. Supervision    Functional Mobility Minimal Assist x 2 with wheeled walker to improve balance, verbal cues for walker sequence and safety. Modified [place]    Balance Sitting:     Static: good     Dynamic: fair   Standing: fair  with wheeled walker     Activity Tolerance Fair minus  good    Visual/  Perceptual Glasses: no                Hand Dominance: right      AROM (PROM) Strength Additional Info:    RUE  Limited in all planes 2+/5 Fair minus       LUE Limited in all plans  2+/5 Fair minus         Hearing: WFL   Sensation:  No c/o numbness or tingling  Tone: WFL   Edema: no    Comments: Upon arrival the patient was in bedside chair. At end of session, patient was returned to bed, HOB elevated so pt could eat with call light and phone within reach, all lines and tubes intact. Overall patient demonstrated decreased independence and safety during completion of ADL/functional transfer/mobility tasks. Pt would benefit from continued skilled OT to increase safety and independence with completion of ADL/IADL tasks for functional independence and quality of life. Treatment: OT treatment provided this date includes:    Instruction/training on safety and adapted techniques for completion of ADLs    Instruction/training on safe functional mobility/transfer techniques    Instruction/training on energy conservation/work simplification for completion of ADLs    Proper Positioning/Alignment     Rehab Potential: Good for established goals. Patient / Family Goal: return to detention       Patient and/or family were instructed on functional diagnosis, prognosis/goals and OT plan of care. Demonstrated good understanding. Eval Complexity: Low    Time In: 11:40am  Time Out: 12:00am    Total Treatment Time: 0       Min Units   OT Eval Low 97165  X  1    OT Eval Medium 60703      OT Eval High 41892      OT Re-Eval [id number]            ADL/Self Care 09037     Therapeutic Activities 03419       Therapeutic Ex 00032       Orthotic Management 44693       Manual 01809     Neuro Re-Ed 46845       Non-Billable Time        Evaluation Time additionally includes thorough review of current medical information, gathering information on past medical history/social history and prior level of function, interpretation of standardized testing/informal observation of tasks, assessment of data and development of plan of care and goals.         Evaluating OT: Marylin Nguyen OTR/L; [id number]

## 2022-01-06 NOTE — H&P
Department of Family Practice  Attending History and Physical        CHIEF COMPLAINT:  SYNCOPE    Reason for Admission:  SYMPTOMATIC BRADYCARDIA     History Obtained From:  patient, electronic medical record    HISTORY OF PRESENT ILLNESS:      The patient is a 80 y.o. female with significant past medical history of HYPERTENSION  who presents with SYNCOPE IS ADMITTED FROM THE ED.  SHE HAS HAD BRADYCARDIA ON MULTIPLE OCCASIONS. SHE HAS BEEN TO THE THE ED FREQUENTLY LATELY. HEAD CT IS NEGATIVE FOR ACUTE PATHOLOGY, CHEST X-RAY POSSIBLE FOR INFILTRATE, URINALYSIS SUSPICIOUS FOR UTI. SHE IS ADMITTED TO A MONITORED BED.     Past Medical History:        Diagnosis Date    Abdominal tenderness of left lower quadrant     Angina pectoris (HCC)     Arthritis     Bacteremia     Bacterial cellulitis     Bite of animal     Cardiac dysrhythmia     Cellulitis and abscess of upper extremity     Cellulitis of forearm     Cellulitis of lower limb     Chest wall pain     Colitis     Cough     Degenerative joint disease involving multiple joints     Dizziness and giddiness     Edema of lower extremity     Flare of rheumatoid arthritis (HCC)     Frozen shoulder     GERD (gastroesophageal reflux disease)     Hip pain, left     History of blood transfusion     Hyperlipidemia     Hypertension     Hypotensive syncope     Hypothyroidism     Laceration with foreign body of other part of head, initial encounter     Left flank pain     Mild dehydration     Mucous membrane inflammation     Multiple fractures of ribs of right side     Musculoskeletal pain     Open bite wound of skin     Oral candidiasis     Orthostatic hypotension     Other specified joint disorders, right ankle and foot     Ovarian mass     Peptic ulcer disease     Peripheral vertigo     Rheumatoid arthritis(714.0)     Right inguinal hernia     Senile osteoporosis     Spinal stenosis, lumbar region without neurogenic claudication     mouth nightly. polyethylene glycol (GLYCOLAX) powder, Take 17 g by mouth daily  predniSONE (DELTASONE) 2.5 MG tablet, Take 2.5 mg by mouth daily  Omega-3 Fatty Acids (FISH OIL) 1000 MG CAPS, Take 1,000 mg by mouth daily  Multiple Vitamins-Minerals (OCUVITE ADULT 50+ PO), Take 1 tablet by mouth daily  levothyroxine (SYNTHROID) 75 MCG tablet, Take 75 mcg by mouth Daily. Vitamin D (CHOLECALCIFEROL) 1000 UNITS CAPS capsule, Take 1,000 Units by mouth daily. Allergies:  Levofloxacin, Bactrim [sulfamethoxazole-trimethoprim], Erythromycin, Amoxicillin, Celebrex [celecoxib], Levaquin [levofloxacin], Clinoril [sulindac], Codeine, and Morphine    Social History:   TOBACCO:   reports that she has never smoked. She has never used smokeless tobacco.  ETOH:   reports no history of alcohol use. DRUGS:   reports no history of drug use. Family History:       Problem Relation Age of Onset    Heart Disease Brother      REVIEW OF SYSTEMS:  CONSTITUTIONAL:  negative  HEENT:  negative  RESPIRATORY:  negative  CARDIOVASCULAR:  negative  GASTROINTESTINAL:  negative  GENITOURINARY:  negative  NEUROLOGICAL:  positive for syncope  PHYSICAL EXAM:    Vitals:  BP (!) 143/101   Pulse 83   Temp 98 °F (36.7 °C) (Infrared)   Resp 16   Ht 5' (1.524 m)   Wt 112 lb (50.8 kg)   SpO2 94%   BMI 21.87 kg/m²     CONSTITUTIONAL:  awake, alert, cooperative, no apparent distress, and appears stated age  ENT:  Normocephalic, without obvious abnormality, atraumatic, sinuses nontender on palpation, external ears without lesions, oral pharynx with moist mucus membranes, tonsils without erythema or exudates, gums normal and good dentition.   BACK:  Symmetric, no curvature, spinous processes are non-tender on palpation, paraspinous muscles are non-tender on palpation, no costal vertebral tenderness  LUNGS:  No increased work of breathing, good air exchange, clear to auscultation bilaterally, no crackles or wheezing  CARDIOVASCULAR:  Normal apical impulse, regular rate and rhythm, normal S1 and S2, no S3 or S4, and no murmur noted  ABDOMEN:  No scars, normal bowel sounds, soft, non-distended, non-tender, no masses palpated, no hepatosplenomegally  SKIN:  SCATTERED BRUISING IN ARMS, LEGS WITH MULTIPLE AREAS OF BRUISING, SOME WITH DRESSINGS.     DATA:  CBC with Differential:    Lab Results   Component Value Date    WBC 8.4 01/05/2022    RBC 3.85 01/05/2022    HGB 12.4 01/05/2022    HCT 39.2 01/05/2022     01/05/2022    .8 01/05/2022    MCH 32.2 01/05/2022    MCHC 31.6 01/05/2022    RDW 14.6 01/05/2022    SEGSPCT 85 03/28/2011    METASPCT 0.9 01/05/2022    LYMPHOPCT 2.6 01/05/2022    MONOPCT 6.1 01/05/2022    BASOPCT 0.1 01/05/2022    MONOSABS 0.50 01/05/2022    LYMPHSABS 0.25 01/05/2022    EOSABS 0.00 01/05/2022    BASOSABS 0.00 01/05/2022     CMP:    Lab Results   Component Value Date     01/05/2022    K 4.8 01/05/2022    CL 96 01/05/2022    CO2 26 01/05/2022    BUN 27 01/05/2022    CREATININE 1.3 01/05/2022    GFRAA 46 01/05/2022    LABGLOM 38 01/05/2022    GLUCOSE 86 01/05/2022    GLUCOSE 101 03/28/2011    PROT 6.6 01/05/2022    LABALBU 3.8 01/05/2022    LABALBU 3.9 03/28/2011    CALCIUM 8.8 01/05/2022    BILITOT 0.7 01/05/2022    ALKPHOS 146 01/05/2022    AST 32 01/05/2022    ALT 14 01/05/2022     PT/INR:    Lab Results   Component Value Date    PROTIME 11.6 12/31/2021    INR 1.0 12/31/2021     U/A:    Lab Results   Component Value Date    COLORU Yellow 01/05/2022    PROTEINU TRACE 01/05/2022    PHUR 6.5 01/05/2022    WBCUA 5-10 01/05/2022    RBCUA 0-1 01/05/2022    BACTERIA FEW 01/05/2022    CLARITYU SLCLOUDY 01/05/2022    SPECGRAV 1.020 01/05/2022    LEUKOCYTESUR MODERATE 01/05/2022    UROBILINOGEN 2.0 01/05/2022    BILIRUBINUR Negative 01/05/2022    BLOODU Negative 01/05/2022    GLUCOSEU Negative 01/05/2022     ASSESSMENT AND PLAN:      Principal Problem:    Symptomatic bradycardia  Plan: ADMIT TO MONITORED BED, MAY NEED ECHO  Active Problems:    Syncope and collapse  Plan: ADMIT TO MONITORED BED, ORTHOSTATICS      INPATIENT ADMISSION. INTERNAL MEDICINE CONSULT, AS ABOVE. DISCHARGE PLANS TO RETURN TO ASSISTED LIVING. ESTIMATED LENGTH OF STAY IS 5 DAYS.

## 2022-01-16 NOTE — ED NOTES
Nurse to nurse called to assisted living.      Johanny Segovia, [address] [facility]  01/15/22 4239

## 2022-01-16 NOTE — ED PROVIDER NOTES
[address]      Pt Name: Lawanda Crouch  MRN: [id number]  [place] 8/4/1929  Date of evaluation: 1/15/2022      CHIEF COMPLAINT     No chief complaint on file. HPI  Lawanda Crouch is a 80 y.o. female with history of hypertension, rheumatoid arthritis who presents from assisted living facility with concerns of possible low pulse ox. Patient apparently tested positive for COVID-19 2 weeks ago. Today at the nursing facility they were unable to obtain a normal pulse ox. EMS arrived and placed her on 2 L nasal cannula and her pulse ox was 100%. The patient currently has no complaints. History limited secondary to patient age and dementia. Patient denies any chest pain or abdominal pain. Review of Systems   Unable to perform ROS: Age        Physical Exam  Vitals and nursing note reviewed. Constitutional:       General: She is not in acute distress. Appearance: She is well-developed. Comments: Awake and alert. Sitting in the gurney in no obvious distress. HENT:      Head: Normocephalic and atraumatic. Right Ear: External ear normal.      Left Ear: External ear normal.      Mouth/Throat:      Mouth: Mucous membranes are moist.   Eyes:      General: No scleral icterus. Pupils: Pupils are equal, round, and reactive to light. Cardiovascular:      Rate and Rhythm: Normal rate and regular rhythm. Heart sounds: No murmur heard. Comments: 2+ radial and dorsal pedis pulses bilaterally  Pulmonary:      Effort: Pulmonary effort is normal. No respiratory distress. Breath sounds: Normal breath sounds. No wheezing. Abdominal:      Palpations: Abdomen is soft. Tenderness: There is no abdominal tenderness. There is no guarding or rebound. Musculoskeletal:         General: No tenderness or deformity. Normal range of motion. Cervical back: Normal range of motion and neck supple. Right lower leg: No edema. Left lower leg: No edema. Skin:     General: Skin is warm and dry. Capillary Refill: Capillary refill takes less than 2 seconds. Neurological:      General: No focal deficit present. Mental Status: She is alert. Cranial Nerves: No cranial nerve deficit. Sensory: No sensory deficit. Motor: No weakness or abnormal muscle tone. Psychiatric:         Mood and Affect: Mood normal.         Behavior: Behavior normal.          Procedures     MDM     This is a 42-year-old female with history of rheumatoid arthritis, CKD, DNRCC who presents from assisted living facility with concern of low oxygen level. In the emergency department the patient is awake and alert, hemodynamically stable, afebrile and in no respiratory distress. Nonhypoxic on room air. Comfortable with no complaints. Known COVID-positive from 2 weeks ago. On room air nonhypoxic in no distress. Well-appearing. X-ray shows no signs of bacterial pneumonia. EKG showed no ischemic changes. Patient observed and continued to be nonhypoxic well-appearing.   Patient discharged back to assisted living facility for further evaluation.                    --------------------------------------------- PAST HISTORY ---------------------------------------------  Past Medical History:  has a past medical history of Abdominal tenderness of left lower quadrant, Angina pectoris ([address] Arthritis, Bacteremia, Bacterial cellulitis, Bite of animal, Cardiac dysrhythmia, Cellulitis and abscess of upper extremity, Cellulitis of forearm, Cellulitis of lower limb, Chest wall pain, Colitis, Cough, Degenerative joint disease involving multiple joints, Dizziness and giddiness, Edema of lower extremity, Flare of rheumatoid arthritis ([address] Frozen shoulder, GERD (gastroesophageal reflux disease), Hip pain, left, History of blood transfusion, Hyperlipidemia, Hypertension, Hypotensive syncope, Hypothyroidism, Laceration with foreign body of other part of head, initial encounter, Left flank pain, Mild dehydration, Mucous membrane inflammation, Multiple fractures of ribs of right side, Musculoskeletal pain, Open bite wound of skin, Oral candidiasis, Orthostatic hypotension, Other specified joint disorders, right ankle and foot, Ovarian mass, Peptic ulcer disease, Peripheral vertigo, Rheumatoid arthritis(714.0), Right inguinal hernia, Senile osteoporosis, Spinal stenosis, lumbar region without neurogenic claudication, Spontaneous ecchymosis, Thyroid disease, and Varicose veins of lower extremity. Past Surgical History:  has a past surgical history that includes Carpal tunnel release; Cholecystectomy; joint replacement; Inguinal hernia repair (Right); knee surgery (Left, 1990); Elbow surgery (Left, 1998); Upper gastrointestinal endoscopy (N/A, 7/22/2020); and Colonoscopy (2007). Social History:  reports that she has never smoked. She has never used smokeless tobacco. She reports that she does not drink alcohol and does not use drugs. Family History: family history includes Heart Disease in her brother. The patients home medications have been reviewed. Allergies: Levofloxacin, Bactrim [sulfamethoxazole-trimethoprim], Erythromycin, Amoxicillin, Celebrex [celecoxib], Ciprofloxacin, Darvon [propoxyphene], Levaquin [levofloxacin], Clinoril [sulindac], Codeine, and Morphine    -------------------------------------------------- RESULTS -------------------------------------------------  Labs:  Results for orders placed or performed during the hospital encounter of 01/15/22   EKG 12 Lead   Result Value Ref Range    Ventricular Rate 96 BPM    Atrial Rate 96 BPM    P-R Interval 120 ms    QRS Duration 74 ms    Q-T Interval 326 ms    QTc Calculation (Bazett) 411 ms    R Axis 1 degrees    T Axis 109 degrees       Radiology:  XR CHEST PORTABLE   Final Result   1. Chronic interstitial opacities bilaterally.    2. No obvious airspace opacity or pleural effusion.             ------------------------- NURSING NOTES AND VITALS REVIEWED ---------------------------  Date / Time Roomed:  1/15/2022  8:35 PM  ED Bed Assignment:  10/10    The nursing notes within the ED encounter and vital signs as below have been reviewed. BP (!) 130/103   Pulse 104   Temp 97 °F (36.1 °C) (Axillary)   Resp 18   SpO2 93%   Oxygen Saturation Interpretation: Normal      ------------------------------------------ PROGRESS NOTES ------------------------------------------  10:13 PM EST  I have spoken with the patient and discussed todays results, in addition to providing specific details for the plan of care and counseling regarding the diagnosis and prognosis. Their questions are answered at this time and they are agreeable with the plan. I discussed at length with them reasons for immediate return here for re evaluation. They will followup with their primary care physician by calling their office tomorrow. --------------------------------- ADDITIONAL PROVIDER NOTES ---------------------------------  At this time the patient is without objective evidence of an acute process requiring hospitalization or inpatient management. They have remained hemodynamically stable throughout their entire ED visit and are stable for discharge with outpatient follow-up. The plan has been discussed in detail and they are aware of the specific conditions for emergent return, as well as the importance of follow-up. New Prescriptions    No medications on file       Diagnosis:  1. Shortness of breath        Disposition:  Patient's disposition: Discharge to home  Patient's condition is stable.        Hong King DO  Resident  01/15/22 8025

## 2022-01-16 NOTE — ED NOTES
Bed: 10  Expected date:   Expected time:   Means of arrival:   Comments:  Carlo Crocker  01/15/22 2036

## 2022-01-23 NOTE — LETTER
[address] Medicaid  CERTIFICATION OF NECESSITY  FOR TRANSPORTATION   BY WHEELCHAIR VAN     Individual Information   1. Name: Manish Tim 2. [place] Medicaid Billing Number:    3. Address: [place] [address]      Transportation Provider Information   4. Provider Name: Yeison Kim   5. [place] Medicaid Provider Number:  National Provider Identifier (NPI):      Certification  7. Criteria:  By signing this document, the practitioner certifies that two statements are true:  A. This individual must be accompanied by a mobility-related assistive device from the point of pick-up to the point of drop-off. B. Transport of this individual by standard passenger vehicle or common carrier is precluded or contraindicated. 8. Period Beginning Date: 1/25/2022     9. Length  [x] Not more than 1 day(s)  [] One Year     Additional Information Relevant to Certification   10. Comments or Explanations, If Necessary or Appropriate   Falls precautions, moderate assistance of one with transfers and ambulation       Certifying Practitioner Information   11. Name of Practitioner: Dr. Prosper Ambriz   12. [place] Medicaid Provider Number, If Applicable:  [address] Provider Identifier (NPI):      Signature Information   14. Date of Signature: 1/25/2022 15. Name of Person Signing: Deanne Andersen RN   16.  Signature and Professional Designation: Electronically signed by Deanne Andersen RN on 1/25/2022 at 3:52 PM     [place] [address]  Rev. 7/2015

## 2022-01-24 PROBLEM — N17.9 ACUTE RENAL FAILURE (HCC): Status: ACTIVE | Noted: 2022-01-01

## 2022-01-24 PROBLEM — E87.5 HYPERKALEMIA: Status: ACTIVE | Noted: 2022-01-01

## 2022-01-24 PROBLEM — N17.9 AKI (ACUTE KIDNEY INJURY) (HCC): Status: ACTIVE | Noted: 2022-01-01

## 2022-01-24 PROBLEM — E86.0 DEHYDRATION: Status: ACTIVE | Noted: 2022-01-01

## 2022-01-24 NOTE — PROGRESS NOTES
Pharmacy Note    Vanda Ruggiero was ordered iron(18mg Fe) 90mg. As per the [address] [place] [address], herbals and certain dietary supplements will be discontinued.   The herbal or dietary supplement may be continued after discharge from the hospital.

## 2022-01-24 NOTE — ED NOTES
Bed: 02  Expected date:   Expected time:   Means of arrival:   Comments:  Gilbert Avery RN  01/23/22 2127

## 2022-01-24 NOTE — PROGRESS NOTES
Physical Therapy Initial Evaluation/Plan of Care    Room #:  1404/1070-53  Patient Name: Joanna Grimm  YOB: 1929  MRN: [id number]    Date of Service: 1/24/2022     Tentative placement recommendation: Subacute rehab  Equipment recommendation: To be determined      Evaluating Physical Therapist: Sonja Hawk, PT #[id number]      Specific Provider Orders/Date/Referring Provider :  01/24/22 1030   PT evaluation and treat Start: 01/24/22 1030, End: 01/24/22 1030, ONE TIME, Standing Count: 1 Occurrences, R    Latisha Atkins, APRN - CNP  01/24/22 0930   PT eval and treat Start: 01/24/22 0930, End: 01/24/22 0930, ONE TIME, Standing Count: 1 Occurrences, FARIDA Gr DO      Admitting Diagnosis:   LATOYA (acute kidney injury) ([facility] [N17.9]     AMS nauseous and is dry heaving    Surgery: none      Patient Active Problem List   Diagnosis    Osteomyelitis of toe of right foot ([address]    Multiple rib fractures    RA (rheumatoid arthritis) ([address]    Closed fracture of nasal bone    Closed fracture of right hand    Closed fracture of maxillary sinus (HCC)    Anemia    Essential hypertension    Gastric ulcer    Symptomatic bradycardia    Syncope and collapse    Stage 2 chronic kidney disease    LATOYA (acute kidney injury) ([address]    Acute renal failure (HCC)        ASSESSMENT of Current Deficits Patient exhibits decreased strength, balance, endurance and confusion impairing functional mobility, transfers, gait , gait distance and tolerance to activity shuffling steps, constant cueing for weight shift for gait. Patient requires continued skilled physical therapy to address concerns listed above for increased safety and function at discharge.          PHYSICAL THERAPY  PLAN OF CARE       Physical therapy plan of care is established based on physician order,  patient diagnosis and clinical assessment    Current Treatment Recommendations:    -Bed Mobility: Lower extremity exercises   -Sitting Balance: Facilitate active trunk muscle engagement , Facilitate postural control in all planes  and Engage in core activities to allow for movement within base of support   -Standing Balance: Perform strengthening exercises in standing to promote motor control with or without upper extremity support   -Transfers: Provide instruction on proper hand and foot position for adequate transfer of weight onto lower extremities and use of gait device if needed and Cues for hand placement, technique and safety. Provide stabilization to prevent fall   -Gait: Gait training, Standing activities to improve: base of support, weight shift, weight bearing  and Pregait training to emphasize: Sequencing , Weight shift, Device control, Upright and Safety   -Endurance: Utilize Supervised activities to increase level of endurance to allow for safe functional mobility including transfers and gait     PT long term treatment goals are located in below grid    Patient and or family understand(s) diagnosis, prognosis, and plan of care. Frequency of treatments: Patient will be seen  3-5 times/week.          Prior Level of Function: Patient ambulated with wheeled walker and assist  Rehab Potential: fair  + for baseline    Past medical history:   Past Medical History:   Diagnosis Date    Abdominal tenderness of left lower quadrant     Angina pectoris ([address]     Arthritis     Bacteremia     Bacterial cellulitis     Bite of animal     Cardiac dysrhythmia     Cellulitis and abscess of upper extremity     Cellulitis of forearm     Cellulitis of lower limb     Chest wall pain     Colitis     Cough     Degenerative joint disease involving multiple joints     Dizziness and giddiness     Edema of lower extremity     Flare of rheumatoid arthritis (HCC)     Frozen shoulder     GERD (gastroesophageal reflux disease)     Hip pain, left     History of blood transfusion     Hyperlipidemia     Hypertension     Hypotensive syncope     Hypothyroidism     Laceration with foreign body of other part of head, initial encounter     Left flank pain     Mild dehydration     Mucous membrane inflammation     Multiple fractures of ribs of right side     Musculoskeletal pain     Open bite wound of skin     Oral candidiasis     Orthostatic hypotension     Other specified joint disorders, right ankle and foot     Ovarian mass     Peptic ulcer disease     Peripheral vertigo     Rheumatoid arthritis(714.0)     Right inguinal hernia     Senile osteoporosis     Spinal stenosis, lumbar region without neurogenic claudication     Spontaneous ecchymosis     Thyroid disease     Varicose veins of lower extremity      Past Surgical History:   Procedure Laterality Date    CARPAL TUNNEL RELEASE      right    CHOLECYSTECTOMY      COLONOSCOPY  2007    Dr. Juan Luis Ribeiro. Elbow,Lt. Knee    KNEE SURGERY Left 1990    UPPER GASTROINTESTINAL ENDOSCOPY N/A 7/22/2020    EGD BIOPSY performed by Emma Vasquez MD at [address]:    Precautions:  Up with assistance, falls, alarm and dementia ,    Social history: Patient lives in a skilled nursing facility      Equipment owned: [employer] at [address]   How much difficulty turning over in bed?: A Lot  How much difficulty sitting down on / standing up from a chair with arms?: A Lot  How much difficulty moving from lying on back to sitting on side of bed?: A Lot  How much help from another person moving to and from a bed to a chair?: A Lot  How much help from another person needed to walk in hospital room?: A Lot  How much help from another person for climbing 3-5 steps with a railing?: A Lot  AM-PAC Inpatient Mobility Raw Score : 12  AM-PAC Inpatient T-Scale Score : 35.33  Mobility Inpatient CMS 0-100% Score: 68.66  Mobility Inpatient CMS G-Code Modifier : CL    Nursing cleared patient for PT evaluation. The admitting diagnosis and active problem list as listed above have been reviewed prior to the initiation of this evaluation. OBJECTIVE;   Initial Evaluation  Date: 1/24/2022 Treatment Date:     Short Term/ Long Term   Goals   Was pt agreeable to Eval/treatment? Yes  To be met in 3 days   Pain level   0/10        Bed Mobility    Rolling: Moderate assist of 1    Supine to sit: Moderate assist of 1    Sit to supine: Moderate assist of 1    Scooting: Moderate assist of 1    Rolling: Minimal assist of 1    Supine to sit: Minimal assist of 1    Sit to supine: Minimal assist of 1    Scooting: Minimal assist of 1     Transfers Sit to stand:  Moderate assist of 1 Cues for hand placement and safety   Sit to stand: Minimal assist of 1     Ambulation    6 side steps using  wheeled walker with Moderate assist of 1   Patient with shuffling steps and flexed posture and cues for sequencing, upright posture, proper hand placement and weigth shfting   20 feet using  wheeled walker with Minimal assist of 1    Stair negotiation: ascended and descended   Not assessed          ROM Within functional limits        Strength BUE:  refer to OT eval  RLE:  3+/5  LLE:  3+/5  Increase strength in affected mm groups by 1/3 grade   Balance Sitting EOB:  fair    Dynamic Standing:  fair wheeled walker   Sitting EOB:  good    Dynamic Standing: good -wheeled walker      Patient is Alert & Oriented x person and follows one step directions    Sensation:  Patient  denies numbness/tingling   Edema:  no   Endurance: fair       Vitals: room air   Blood Pressure at rest  Blood Pressure during session    Heart Rate at rest 81 Heart Rate during session     SPO2 at rest 91%  SPO2 during session  %     Patient education  Patient educated on role of Physical Therapy, risks of immobility, safety and plan of care,  importance of mobility while in hospital  and ankle pumps, quad set and glut set for edema control, blood clot prevention     Patient response to education:   Pt verbalized understanding Pt demonstrated skill Pt requires further education in this area   Yes Partial Yes      Treatment:  Patient practiced and was instructed/facilitated in the following treatment: Patient assisted to edge of bed, Sat edge of bed 10 minutes with Minimal assist of 1 to increase dynamic sitting balance and activity tolerance. performed exercises,  stood with side steps to Head of bed returned to bed. Maximal assist of 1 to scoot to Head of bed in trendelenburg       Therapeutic Exercises:  ankle pumps and long arc quad  x 10 reps. At end of session, patient in bed with alarm call light and phone within reach,  all lines and tubes intact, nursing notified. Patient would benefit from continued skilled Physical Therapy to improve functional independence and quality of life. Patient's/ family goals   none stated    Time in  1336  Time out  1400    Total Treatment Time  4 minutes    Evaluation time includes thorough review of current medical information, gathering information on past medical history/social history and prior level of function, completion of standardized testing/informal observation of tasks, assessment of data, and development of Plan of care and goals.      CPT codes:  Low Complexity PT evaluation (71569)  Non billable time 4 minutes    Sruthi Hyde, PT

## 2022-01-24 NOTE — ED NOTES
Bryant Chua called at this time in regards to admission orders. Voicemail left at this time.      Zehra Owen RN  01/24/22 9754

## 2022-01-24 NOTE — CONSULTS
Department of Internal Medicine  Internal Medicine Consultation Note    Primary Care Physician: Radha Bashir DO   Admitting Physician:  Radha Bashir DO  Admission date and time: 1/23/2022  9:27 PM    Room:  03390339-  Admitting diagnosis: LATOYA (acute kidney injury) [facility]) [N17.9]    Patient Name: Trinity Gordon  MRN: [id number]    Date of Service: 1/24/2022     Reason for consultation: Renal failure    HISTORY OF PRESENT ILLNESS:    Charly Bishop is a 58-year-old female patient who presented to [address] [place] [address] emergency department for altered mental status. By the time EMS had arrived reportedly the patient was back at her baseline with advanced dementia and chronic confusion. She was found to be in acute on chronic renal failure with elevated cardiac enzyme and was subsequent admitted to the service of Dr. Alexx Armenta for further care management. Internal medicine consultation has been requested upon admission. Patient is seen and examined at bedside today. She is awake, pleasant and cooperative but confused as per her baseline. No abnormal behaviors appreciated however this does limit the collection of subjective data.     PAST MEDICAL Hx:  Past Medical History:   Diagnosis Date    Abdominal tenderness of left lower quadrant     Angina pectoris (HCC)     Arthritis     Bacteremia     Bacterial cellulitis     Bite of animal     Cardiac dysrhythmia     Cellulitis and abscess of upper extremity     Cellulitis of forearm     Cellulitis of lower limb     Chest wall pain     Colitis     Cough     Degenerative joint disease involving multiple joints     Dizziness and giddiness     Edema of lower extremity     Flare of rheumatoid arthritis (HCC)     Frozen shoulder     GERD (gastroesophageal reflux disease)     Hip pain, left     History of blood transfusion     Hyperlipidemia     Hypertension     Hypotensive syncope     Hypothyroidism     Laceration with foreign body of other part of head, initial encounter     Left flank pain     Mild dehydration     Mucous membrane inflammation     Multiple fractures of ribs of right side     Musculoskeletal pain     Open bite wound of skin     Oral candidiasis     Orthostatic hypotension     Other specified joint disorders, right ankle and foot     Ovarian mass     Peptic ulcer disease     Peripheral vertigo     Rheumatoid arthritis(714.0)     Right inguinal hernia     Senile osteoporosis     Spinal stenosis, lumbar region without neurogenic claudication     Spontaneous ecchymosis     Thyroid disease     Varicose veins of lower extremity        PAST SURGICAL Hx:   Past Surgical History:   Procedure Laterality Date    CARPAL TUNNEL RELEASE      right    CHOLECYSTECTOMY      COLONOSCOPY  2007    Dr. Bella Christensen. Elbow,Lt. Knee    KNEE SURGERY Left 1990    UPPER GASTROINTESTINAL ENDOSCOPY N/A 7/22/2020    EGD BIOPSY performed by Dejah Nuno MD at [address] Hx:  Family History   Problem Relation Age of Onset    Heart Disease Brother        HOME MEDICATIONS:  Prior to Admission medications    Medication Sig Start Date End Date Taking?  Authorizing Provider   albuterol sulfate HFA (PROAIR HFA) 108 (90 Base) MCG/ACT inhaler Inhale 2 puffs into the lungs every 6 hours as needed for Wheezing 12/31/21 1/7/22  Chelo Finley, DO   Prenatal-FeCbn-FeAspGl-FA-Omeg (FOLCAPS OMEGA 3 PO) Take 1 tablet by mouth daily    Historical Provider, MD   Ferrous Sulfate (IRON PO) Take by mouth    Historical Provider, MD   furosemide (LASIX) 20 MG tablet Take 20 mg by mouth daily 1/2 TAB BY MOUTH    Historical Provider, MD   clotrimazole (MYCELEX) 10 MG trevin Take 10 mg by mouth every 4 hours    Historical Provider, MD   hydroxychloroquine (PLAQUENIL) 200 MG tablet Take 200 mg by mouth daily    Historical Provider, MD   zoledronic acid (RECLAST) 5 MG/100ML SOLN History Narrative    Not on file     Social Determinants of Health     Financial Resource Strain:     Difficulty of Paying Living Expenses: Not on file   Food Insecurity:     Worried About Running Out of Food in the Last Year: Not on file    Reynaldo of Food in the Last Year: Not on file   Transportation Needs:     Lack of Transportation (Medical): Not on file    Lack of Transportation (Non-Medical): Not on file   Physical Activity:     Days of Exercise per Week: Not on file    Minutes of Exercise per Session: Not on file   Stress:     Feeling of Stress : Not on file   Social Connections:     Frequency of Communication with Friends and Family: Not on file    Frequency of Social Gatherings with Friends and Family: Not on file    Attends Judaism Services: Not on file    Active Member of [address] [place] [address] [employer] or Organizations: Not on file    Attends Club or Organization Meetings: Not on file    Marital Status: Not on file   Intimate Partner Violence:     Fear of Current or Ex-Partner: Not on file    Emotionally Abused: Not on file    Physically Abused: Not on file    Sexually Abused: Not on file   Housing Stability:     Unable to Pay for Housing in the Last Year: Not on file    Number of [place] in the Last Year: Not on file    Unstable Housing in the Last Year: Not on file       ROS: 12 point review of symptoms was attempted unsuccessfully due to dementia and confusion      PHYSICAL EXAM:  VITALS:  Vitals:    01/24/22 0930   BP: (!) 145/81   Pulse: 103   Resp: 16   Temp: 97.9 °F (36.6 °C)   SpO2: 97%         CONSTITUTIONAL:    Awake, alert, pleasant and cooperative, chronically ill-appearing without distress    EYES:    PERRL, EOMI, sclera clear without icterus, conjunctiva normal    ENT:    Normocephalic, atraumatic, sinuses nontender on palpation. External ears without lesions.  Oral pharynx with slightly dry mucosa    NECK:    Supple, symmetrical, trachea midline, no adenopathy, thyroid symmetric, not enlarged and no tenderness, skin normal, no bruits, no JVD    HEMATOLOGIC/LYMPHATICS:    No cervical lymphadenopathy and no supraclavicular lymphadenopathy    LUNGS:    Symmetric. No increased work of breathing, very mildly diminished bibasilar air exchange, clear to auscultation bilaterally, no wheezes, rhonchi, or rales,     CARDIOVASCULAR:    Normal apical impulse, regular rate and rhythm, normal S1 and S2, systolic* murmur noted    ABDOMEN:    Normal contour, normal bowel sounds, soft, non-distended, non-tender, no masses palpated, no hepatosplenomegaly, no rebound or guarding elicited on palpation     MUSCULOSKELETAL:    There is no redness, warmth, or swelling of the joints. Tone is normal.    NEUROLOGIC:    Awake, alert, oriented to name, disoriented to place and time. Generally weak without focal deficit    SKIN:    No bruising or bleeding. No redness, warmth, or swelling    EXTREMITIES:    Peripheral pulses present. No edema, cyanosis, or swelling. LABORATORY DATA:  CBC with Differential:    Lab Results   Component Value Date    WBC 9.6 01/23/2022    RBC 4.26 01/23/2022    HGB 13.2 01/23/2022    HCT 43.2 01/23/2022     01/23/2022    .4 01/23/2022    MCH 31.0 01/23/2022    MCHC 30.6 01/23/2022    RDW 14.0 01/23/2022    SEGSPCT 85 03/28/2011    METASPCT 0.9 01/05/2022    LYMPHOPCT 4.4 01/23/2022    MONOPCT 6.4 01/23/2022    BASOPCT 0.1 01/23/2022    MONOSABS 0.61 01/23/2022    LYMPHSABS 0.42 01/23/2022    EOSABS 0.01 01/23/2022    BASOSABS 0.01 01/23/2022     BMP:    Lab Results   Component Value Date     01/23/2022    K 5.2 01/23/2022     01/23/2022    CO2 17 01/23/2022    BUN 74 01/23/2022    LABALBU 3.3 01/23/2022    LABALBU 3.9 03/28/2011    CREATININE 2.2 01/23/2022    CALCIUM 9.2 01/23/2022    GFRAA 25 01/23/2022    LABGLOM 21 01/23/2022    GLUCOSE 121 01/23/2022    GLUCOSE 101 03/28/2011       ASSESSMENT:  1.  Acute kidney injury superimposed on chronic kidney disease stage IIIb-IV   2. Elevated cardiac enzymes with recent admission for syncope with reported bradycardia dysrhythmia  3. Dementia, advanced with transient alterations in awareness that resolved prior to arrival   4. Rheumatoid arthritis  5. Hypertension  6. Hyperlipidemia  7. Hypothyroidism      PLAN:  Charmayne Loge was admitted with reported altered mental status that resolved prior to hospital arrival and was found to have acute on chronic renal failure. She is on potentially nephrotoxic medications including Lasix and losartan which are held. Gentle fluids are being administered. Urine eosinophils and indices are being obtained. Strict intake and output, Yoder catheterization will be utilized. Cardiac enzymes are also elevated and she was recently admitted and hospitalized for syncope due to a bradycardia dysrhythmia. She is a DNR CC, son favored extensive work-up on last hospitalization but was not interested in aggressive treatment such as pacemaker at that time. We will asked the cardiovascular team to provide consultation to present treatment possibilities to the patient and her son despite being a DNR CC. Underlying co-morbidites will be addressed during hospitalization as well. Labs and vital signs will be monitored closely and addressed accordingly. See additional orders for details.      ANAYELI Gongora CNP  10:23 AM  1/24/2022    Electronically signed by ANAYELI Gongora CNP on 1/24/22 at 10:23 AM EST

## 2022-01-24 NOTE — ED PROVIDER NOTES
79 yo female with history of dementia sent from nursing facility for AMS. Per EMS, nursing home worker stated that while talking to patient, patient stopped responding to him. By the time EMS arrived, patient was back to baseline. Patient stated she feels \"lousy. \" On initial exam, she complains of feeling nauseous and is dry heaving. She is alert to person and when asked where she is at she states she is \"on the way to [facility] as she is not doing too well. \" She cannot specify any other complaints. Review of Systems   Unable to perform ROS: Dementia        Physical Exam  Constitutional:       General: She is not in acute distress. Appearance: She is not ill-appearing or toxic-appearing. HENT:      Head: Normocephalic and atraumatic. Mouth/Throat:      Comments: Dry mucous membrane  Eyes:      Extraocular Movements: Extraocular movements intact. Pupils: Pupils are equal, round, and reactive to light. Cardiovascular:      Rate and Rhythm: Normal rate and regular rhythm. Pulmonary:      Effort: Pulmonary effort is normal.      Breath sounds: Normal breath sounds. Abdominal:      General: There is no distension. Palpations: Abdomen is soft. Tenderness: There is no abdominal tenderness. Musculoskeletal:         General: No swelling. Skin:     General: Skin is warm and dry. Neurological:      Mental Status: She is alert. GCS: GCS eye subscore is 4. GCS verbal subscore is 5. GCS motor subscore is 6. Psychiatric:         Mood and Affect: Mood normal.         Behavior: Behavior normal.          Procedures     MDM  Number of Diagnoses or Management Options  LATOYA (acute kidney injury) [facility])  Diagnosis management comments: 49-year-old female sent from nursing facility for concern for altered mental status. Patient A&O x2 on arrival.  Labs remarkable for creatinine 2.2, previously 1.1, otherwise unremarkable. CT head unremarkable. No pneumonia on chest x-ray.   No UTI on UA. Patient appeared clinically dry. Due to LATOYA and possible dehydration, decision was made to admit patient for evaluation and treatment. Patient was given fluids in the ED and was admitted to [place] [address] in stable condition. Amount and/or Complexity of Data Reviewed  Clinical lab tests: reviewed                         --------------------------------------------- PAST HISTORY ---------------------------------------------  Past Medical History:  has a past medical history of Abdominal tenderness of left lower quadrant, Angina pectoris ([address] Arthritis, Bacteremia, Bacterial cellulitis, Bite of animal, Cardiac dysrhythmia, Cellulitis and abscess of upper extremity, Cellulitis of forearm, Cellulitis of lower limb, Chest wall pain, Colitis, Cough, Degenerative joint disease involving multiple joints, Dizziness and giddiness, Edema of lower extremity, Flare of rheumatoid arthritis ([address] Frozen shoulder, GERD (gastroesophageal reflux disease), Hip pain, left, History of blood transfusion, Hyperlipidemia, Hypertension, Hypotensive syncope, Hypothyroidism, Laceration with foreign body of other part of head, initial encounter, Left flank pain, Mild dehydration, Mucous membrane inflammation, Multiple fractures of ribs of right side, Musculoskeletal pain, Open bite wound of skin, Oral candidiasis, Orthostatic hypotension, Other specified joint disorders, right ankle and foot, Ovarian mass, Peptic ulcer disease, Peripheral vertigo, Rheumatoid arthritis(714.0), Right inguinal hernia, Senile osteoporosis, Spinal stenosis, lumbar region without neurogenic claudication, Spontaneous ecchymosis, Thyroid disease, and Varicose veins of lower extremity. Past Surgical History:  has a past surgical history that includes Carpal tunnel release; Cholecystectomy; joint replacement; Inguinal hernia repair (Right); knee surgery (Left, 1990); Elbow surgery (Left, 1998);  Upper gastrointestinal endoscopy (N/A, 7/22/2020); and Colonoscopy (2007). Social History:  reports that she has never smoked. She has never used smokeless tobacco. She reports that she does not drink alcohol and does not use drugs. Family History: family history includes Heart Disease in her brother. The patients home medications have been reviewed.     Allergies: Levofloxacin, Bactrim [sulfamethoxazole-trimethoprim], Erythromycin, Amoxicillin, Celebrex [celecoxib], Ciprofloxacin, Darvon [propoxyphene], Levaquin [levofloxacin], Clinoril [sulindac], Codeine, and Morphine    -------------------------------------------------- RESULTS -------------------------------------------------    LABS:  Results for orders placed or performed during the hospital encounter of 01/23/22   CBC Auto Differential   Result Value Ref Range    WBC 9.6 4.5 - 11.5 E9/L    RBC 4.26 3.50 - 5.50 E12/L    Hemoglobin 13.2 11.5 - 15.5 g/dL    Hematocrit 43.2 34.0 - 48.0 %    .4 (H) 80.0 - 99.9 fL    MCH 31.0 26.0 - 35.0 pg    MCHC 30.6 (L) 32.0 - 34.5 %    RDW 14.0 11.5 - 15.0 fL    Platelets 717 575 - 489 E9/L    MPV 9.9 7.0 - 12.0 fL    Neutrophils % 88.0 (H) 43.0 - 80.0 %    Immature Granulocytes % 1.0 0.0 - 5.0 %    Lymphocytes % 4.4 (L) 20.0 - 42.0 %    Monocytes % 6.4 2.0 - 12.0 %    Eosinophils % 0.1 0.0 - 6.0 %    Basophils % 0.1 0.0 - 2.0 %    Neutrophils Absolute 8.42 (H) 1.80 - 7.30 E9/L    Immature Granulocytes # 0.10 E9/L    Lymphocytes Absolute 0.42 (L) 1.50 - 4.00 E9/L    Monocytes Absolute 0.61 0.10 - 0.95 E9/L    Eosinophils Absolute 0.01 (L) 0.05 - 0.50 E9/L    Basophils Absolute 0.01 0.00 - 0.20 E9/L    Poikilocytes 1+     Tomball Cells 1+    Comprehensive Metabolic Panel w/ Reflex to MG   Result Value Ref Range    Sodium 135 132 - 146 mmol/L    Potassium reflex Magnesium 5.2 (H) 3.5 - 5.0 mmol/L    Chloride 102 98 - 107 mmol/L    CO2 17 (L) 22 - 29 mmol/L    Anion Gap 16 7 - 16 mmol/L    Glucose 121 (H) 74 - 99 mg/dL    BUN 74 (H) 6 - 23 mg/dL    CREATININE 2.2 (H) 0.5 - 1.0 mg/dL    GFR Non-African American 21 >=60 mL/min/1.73    GFR African American 25     Calcium 9.2 8.6 - 10.2 mg/dL    Total Protein 6.6 6.4 - 8.3 g/dL    Albumin 3.3 (L) 3.5 - 5.2 g/dL    Total Bilirubin 0.8 0.0 - 1.2 mg/dL    Alkaline Phosphatase 131 (H) 35 - 104 U/L    ALT 17 0 - 32 U/L    AST 40 (H) 0 - 31 U/L   Brain Natriuretic Peptide   Result Value Ref Range    Pro-BNP 5,373 (H) 0 - 450 pg/mL   Lipase   Result Value Ref Range    Lipase 45 13 - 60 U/L   Urinalysis, reflex to microscopic   Result Value Ref Range    Color, UA Yellow Straw/Yellow    Clarity, UA Clear Clear    Glucose, Ur Negative Negative mg/dL    Bilirubin Urine Negative Negative    Ketones, Urine Negative Negative mg/dL    Specific Gravity, UA 1.020 1.005 - 1.030    Blood, Urine Negative Negative    pH, UA 5.0 5.0 - 9.0    Protein, UA Negative Negative mg/dL    Urobilinogen, Urine 0.2 <2.0 E.U./dL    Nitrite, Urine Negative Negative    Leukocyte Esterase, Urine Negative Negative   Lactic Acid, Plasma   Result Value Ref Range    Lactic Acid 2.2 0.5 - 2.2 mmol/L   SPECIMEN REJECTION   Result Value Ref Range    Rejected Test trp5     Reason for Rejection see below    Troponin   Result Value Ref Range    Troponin, High Sensitivity 90 (H) 0 - 9 ng/L   Troponin   Result Value Ref Range    Troponin, High Sensitivity 79 (H) 0 - 9 ng/L   POCT Glucose   Result Value Ref Range    Meter Glucose 118 (H) 74 - 99 mg/dL       RADIOLOGY:  XR CHEST PORTABLE   Final Result   No acute process. CT Head WO Contrast   Final Result      Stable study. No evidence for acute intracranial hemorrhage, territorial   infarction or intracranial mass lesion. Moderate chronic microangiopathic ischemic disease. Moderate to severe   generalized volume loss. Moderate severe mucosal thickening of right maxillary sinus.              EKG:  This EKG is signed and interpreted by emergency physician    Rate: 93  Rhythm: Sinus  Interpretation: no acute ST-T changes  Comparison: stable as compared to patient's most recent EKG      ------------------------- NURSING NOTES AND VITALS REVIEWED ---------------------------  Date / Time Roomed:  1/23/2022  9:27 PM  ED Bed Assignment:  02/02    The nursing notes within the ED encounter and vital signs as below have been reviewed. Patient Vitals for the past 24 hrs:   BP Temp Temp src Pulse Resp SpO2   01/24/22 0159 (!) 122/90 97.5 °F (36.4 °C) Bladder 92 19 98 %   01/23/22 2152    88 16 100 %   01/23/22 2137 (!) 129/94            Oxygen Saturation Interpretation: Normal      --------------------------------- ADDITIONAL PROVIDER NOTES ---------------------------------    This patient's ED course included: a personal history and physicial examination, re-evaluation prior to disposition, multiple bedside re-evaluations, cardiac monitoring and continuous pulse oximetry    This patient has remained hemodynamically stable during their ED course. Diagnosis:  1. LATOYA (acute kidney injury) ([facility] [address]        Disposition:  Patient's disposition: Admit to med/surg floor  Patient's condition is stable. Maddy Mccoy MD  Resident  01/24/22 [id number]    ATTENDING PROVIDER ATTESTATION:     I have personally performed and/or participated in the history, exam, medical decision making, and procedures and agree with all pertinent clinical information. I have also reviewed and agree with the past medical, family and social history unless otherwise noted. I have discussed this patient in detail with the resident, and provided the instruction and education regarding altered mental status and dementia. My findings/Plan: Heart RRR. Lungs CTA bilaterally. Abdomen soft. Nontender. Bowel sounds normal. Supportive care. Admit for further evaluation and treatment.          Mario Kapadia,   02/17/22 [id number]

## 2022-01-24 NOTE — CONSULTS
Inpatient [facility] Consultation      Reason for Consult: Elevated troponin    Consulting Physician: Dr. Luan Duff    Requesting Physician: Dr. Penny Sung    Date of Consultation: 1/24/2022    HISTORY OF PRESENT ILLNESS:   Patient is a 80year old WF not previously known to [facility]. She is being seen in consultation this hospital admission by Dr. Luan Duff for evaluation and recommendations regarding elevated troponin. Of note, patient is a poor historian given her baseline dementia and confusion. She is alert and oriented to person only during my time on the floor -- thinks she is in [facility] and it's 6004. No family present at bedside during my time on the floor. As a result, most of history obtained through chart review and discussion with medical staff. Patient has a known past medical history of chronic kidney disease, rheumatoid arthritis on Plaquenil therapy, hypertension, hyperlipidemia, hypothyroidism on replacement therapy, advanced dementia, GERD, hiatal hernia, gastric ulcers by prior EGD, carotid artery disease, pulmonary hypertension, recurrent mechanical falls and DNR-CC code status. Through chart review, patient reportedly presented to [address] [place] [address] on January 23, 2022 from [facility] facility due to altered mental status. However, by the time EMS arrived, patient was supposedly back at her baseline mental status with advanced dementia and confusion. She was brought to the ED for evaluation, and was noted to have LATOYA on CKD --> admitted for further treatment and evaluation. On interview today, patient is able to tell me she \"falls a lot\", and has a skin tear to her anterior chest that is tender on palpation. She otherwise denies chest pain or SOB, but ROS is limited at this time. Limited family and social history as well. Labs and diagnostic testing as noted below.          Please note: past medical records were reviewed per electronic medical record (EMR) - see detailed reports under Past Medical/ Surgical History. PAST MEDICAL HISTORY:    · Chronic kidney disease. Baseline Cr 1.1 to 1.3. · Rheumatoid arthritis, on Plaquenil. · Hypertension. · Hyperlipidemia, not on statin therapy. · Hypothyroidism, on replacement therapy. · Advanced dementia. · GERD. Hiatal hernia. Gastric ulcers. · Recurrent mechanical falls. · Mild carotid artery disease by US 1/2022. · DNR-CC code status. · Pulmonary hypertension. · Admission 1/2022 for questionable syncopal episode resulting in fall and scalp laceration requiring staples. Head CT negative for acute abnormality. Possible reported bradycardia with HR in the 30s. Not evaluated by cardiology at that time. CARDIAC TESTING    TTE (Dr. Etta Madrigal, 1/5/2022)  Summary   Left ventricular size is grossly normal.   No evidence of left ventricular mass or thrombus noted. Normal left ventricular wall thickness. Ejection fraction is visually estimated at 68%. No evidence of left ventricular mass or thrombus noted. The left atrium is mild-moderately dilated. Interatrial septum appears intact. Physiologic and/or trace mitral regurgitation is present. No evidence of mitral valve stenosis. Mitral annular calcification is present. Physiologic and/or trace tricuspid regurgitation. RVSP is 33 mmHg. Pulmonary hypertension is mild . The pulmonic valve was not well visualized. Regular rhythm. PAST SURGICAL HISTORY:    Past Surgical History:   Procedure Laterality Date    CARPAL TUNNEL RELEASE      right    CHOLECYSTECTOMY      COLONOSCOPY  2007    Dr. Carlotta Melton. Elbow,Lt. Knee    KNEE SURGERY Left 1990    UPPER GASTROINTESTINAL ENDOSCOPY N/A 7/22/2020    EGD BIOPSY performed by Oliver Miller MD at [address]:  Prior to Admission medications    Medication Sig Start Date End Date Taking? Authorizing Provider   albuterol sulfate HFA (PROAIR HFA) 108 (90 Base) MCG/ACT inhaler Inhale 2 puffs into the lungs every 6 hours as needed for Wheezing 12/31/21 1/7/22  Minerva Finley, DO   Prenatal-FeCbn-FeAspGl-FA-Omeg (FOLCAPS OMEGA 3 PO) Take 1 tablet by mouth daily    Historical Provider, MD   Ferrous Sulfate (IRON PO) Take by mouth    Historical Provider, MD   furosemide (LASIX) 20 MG tablet Take 20 mg by mouth daily 1/2 TAB BY MOUTH    Historical Provider, MD   clotrimazole (MYCELEX) 10 MG trevin Take 10 mg by mouth every 4 hours    Historical Provider, MD   hydroxychloroquine (PLAQUENIL) 200 MG tablet Take 200 mg by mouth daily    Historical Provider, MD   zoledronic acid (RECLAST) 5 MG/100ML SOLN Infuse 5 mg intravenously once    Historical Provider, MD   acetaminophen (TYLENOL) 500 MG tablet Take 1 tablet by mouth 2 times daily as needed for Pain or Fever 2x daily 7/26/20   Di Arora MD   pantoprazole (PROTONIX) 40 MG tablet Take 1 tablet by mouth every morning (before breakfast) 7/27/20   Di Arora MD   losartan (COZAAR) 50 MG tablet Take 1 tablet by mouth daily Hold until systolic is greater then 575 7/26/20   Di Arora MD   gabapentin (NEURONTIN) 100 MG capsule Take 100 mg by mouth 2 times daily at 0800 and 1400. Historical Provider, MD   polyethylene glycol (GLYCOLAX) powder Take 17 g by mouth daily    Historical Provider, MD   predniSONE (DELTASONE) 2.5 MG tablet Take 2.5 mg by mouth daily    Historical Provider, MD   Omega-3 Fatty Acids (FISH OIL) 1000 MG CAPS Take 1,000 mg by mouth daily    Historical Provider, MD   Multiple Vitamins-Minerals (OCUVITE ADULT 50+ PO) Take 1 tablet by mouth daily    Historical Provider, MD   levothyroxine (SYNTHROID) 75 MCG tablet Take 75 mcg by mouth Daily. Historical Provider, MD   Vitamin D (CHOLECALCIFEROL) 1000 UNITS CAPS capsule Take 1,000 Units by mouth daily.     Historical Provider, MD       CURRENT MEDICATIONS:      Current Facility-Administered Medications:     clotrimazole (MYCELEX) trevin 10 mg, 10 mg, Oral, Q4H, Ozella Look, DO    gabapentin (NEURONTIN) capsule 100 mg, 100 mg, Oral, BID- 8&2, Ozella Look, DO, 100 mg at 01/24/22 1007    levothyroxine (SYNTHROID) tablet 75 mcg, 75 mcg, Oral, QAM AC, Ozella Look, DO, 75 mcg at 01/24/22 1007    [Held by provider] losartan (COZAAR) tablet 50 mg, 50 mg, Oral, Daily, Ozella Look, DO, 50 mg at 01/24/22 1007    [START ON 1/25/2022] pantoprazole (PROTONIX) tablet 40 mg, 40 mg, Oral, QAM AC, Ozella Look, DO    Vitamin D (CHOLECALCIFEROL) tablet 1,000 Units, 1,000 Units, Oral, Daily, Ozella Look, DO, 1,000 Units at 01/24/22 1007    polyethylene glycol (GLYCOLAX) packet 17 g, 17 g, Oral, Daily, Ozella Look, DO, 17 g at 01/24/22 1007    0.9 % sodium chloride infusion, , IntraVENous, Continuous, Yarelis Heaven, APRN - CNP, Last Rate: 75 mL/hr at 01/24/22 1037, Rate Change at 01/24/22 1037    hydroxychloroquine (PLAQUENIL) tablet 200 mg, 200 mg, Oral, Daily, Praneeth Duel, DO    predniSONE (DELTASONE) tablet 10 mg, 10 mg, Oral, Daily, Ozella Look, DO, 10 mg at 01/24/22 1012    albuterol (PROVENTIL) nebulizer solution 2.5 mg, 2.5 mg, Nebulization, Q6H PRN, Ozella Look, DO    therapeutic multivitamin-minerals 1 tablet, 1 tablet, Oral, Lunch, Ozella Look, DO, 1 tablet at 01/24/22 1012    amLODIPine (NORVASC) tablet 2.5 mg, 2.5 mg, Oral, Daily, Yarelis Heaven, APRN - CNP, 2.5 mg at 01/24/22 1039    sodium chloride flush 0.9 % injection 5-40 mL, 5-40 mL, IntraVENous, 2 times per day, Yarelis Heaven, APRN - CNP, 10 mL at 01/24/22 1040    sodium chloride flush 0.9 % injection 5-40 mL, 5-40 mL, IntraVENous, PRN, Yarelis Heaven, APRN - CNP    0.9 % sodium chloride infusion, 25 mL, IntraVENous, PRN, Yarelis Heaven, APRN - CNP    ondansetron (ZOFRAN-ODT) disintegrating tablet 4 mg, 4 mg, Oral, Q8H PRN **OR** ondansetron (ZOFRAN) injection 4 mg, 4 mg, IntraVENous, Q6H PRN, Alexandra Singleton APRN - CNP    [START ON 1/25/2022] polyethylene glycol (GLYCOLAX) packet 17 g, 17 g, Oral, Daily PRN, Alexandra Staple, APRN - CNP    acetaminophen (TYLENOL) tablet 650 mg, 650 mg, Oral, Q6H PRN **OR** acetaminophen (TYLENOL) suppository 650 mg, 650 mg, Rectal, Q6H PRN, Alexandra Singleton, APRN - CNP    enoxaparin (LOVENOX) injection 30 mg, 30 mg, SubCUTAneous, Daily, Clayborne Staple, APRN - CNP      ALLERGIES:  Levofloxacin, Bactrim [sulfamethoxazole-trimethoprim], Erythromycin, Amoxicillin, Celebrex [celecoxib], Ciprofloxacin, Darvon [propoxyphene], Levaquin [levofloxacin], Clinoril [sulindac], Codeine, and Morphine    SOCIAL HISTORY:    Unable to obtain at this time due to patient's altered mental status and baseline dementia. FAMILY HISTORY:   Unable to obtain at this time due to patient's altered mental status and baseline dementia. REVIEW OF SYSTEMS:     Limited at at this time due to patient's altered mental status and baseline dementia. Notes of mild tenderness to skin tear on anterior chest, but otherwise denies chest pain or shortness of breath. PHYSICAL EXAM:   BP (!) 145/81   Pulse 103   Temp 97.9 °F (36.6 °C) (Oral)   Resp 16   SpO2 97%   CONST:  Well developed, well nourished elderly WF who appears stated age. Awake, alert, cooperative, no apparent distress. HEENT:   Head- Normocephalic, atraumatic. Eyes- Conjunctivae pink, anicteric. Neck-  No stridor, trachea midline, no apparent jugular venous distention. CHEST: Chest symmetrical. No accessory muscle use or intercostal retractions. RESPIRATORY: Lung sounds - clear throughout fields. CARDIOVASCULAR:     Heart Ausculation- Regular rate and rhythm, no significant murmur appreciated. PV: No lower extremity edema. Pedal pulses palpable, no clubbing or cyanosis. ABDOMEN: Soft, non-tender to light palpation. Bowel sounds present. SKIN: Warm and dry.    Courtney Goody / PSYCH: Pleasantly confused. DATA:      Diagnostic:  All diagnostic testing and lab work thus far this admission reviewed in detail. CT Head 1/23/2022:  Impression:   Stable study.  No evidence for acute intracranial hemorrhage, territorial  infarction or intracranial mass lesion. Moderate chronic microangiopathic ischemic disease. Moderate to severe  generalized volume loss. Moderate severe mucosal thickening of right maxillary sinus. CXR 1/23/2022:  Impression:  No acute process.       Intake/Output Summary (Last 24 hours) at 1/24/2022 1054  Last data filed at 1/24/2022 0951  Gross per 24 hour   Intake 120 ml   Output    Net 120 ml       Labs:   CBC:   Recent Labs     01/23/22 2151   WBC 9.6   HGB 13.2   HCT 43.2        BMP:   Recent Labs     01/23/22 2151      K 5.2*   CO2 17*   BUN 74*   CREATININE 2.2*   LABGLOM 21   CALCIUM 9.2     HgA1c:   Lab Results   Component Value Date    LABA1C 5.2 01/06/2022     FASTING LIPID PANEL:  Lab Results   Component Value Date    CHOL 246 01/06/2022    HDL 60 01/06/2022    LDLCALC 157 01/06/2022    TRIG 144 01/06/2022     LIVER PROFILE:  Recent Labs     01/23/22 2151   AST 40*   ALT 17   LABALBU 3.3*      Ref Range & Units 01/23/22 2233   Troponin, High Sensitivity 0 - 9 ng/L 90 High        Ref Range & Units 01/24/22 0030   Troponin, High Sensitivity 0 - 9 ng/L 79 High        Ref Range & Units 01/23/22 2151   Pro-BNP 0 - 450 pg/mL 5,373 High        01/23/22 2324    Color, UA Straw/Yellow Yellow    Clarity, UA Clear Clear    Glucose, Ur Negative mg/dL Negative    Bilirubin Urine Negative Negative    Ketones, Urine Negative mg/dL Negative    Specific Gravity, UA 1.005 - 1.030 1.020    Blood, Urine Negative Negative    pH, UA 5.0 - 9.0 5.0    Protein, UA Negative mg/dL Negative    Urobilinogen, Urine <2.0 E.U./dL 0.2    Nitrite, Urine Negative Negative    Leukocyte Esterase, Urine Negative Negative        ASSESSMENT:  · Elevated troponin, likely acute on chronic myocardial injury in the setting of LATOYA/CKD. hS-troponin 90 --> 79. No acute ST-T wave changes on EKG. · Mild pulmonary HTN, with LVEF 68% and no significant valvular abnormalities on TTE 1/5/2022. · Altered mental status. Head CT negative for acute abnormality. Advanced dementia at baseline. · LATOYA/CKD. Hyperkalemia on admission. · Elevated AST. · Hypoalbuminemia. · Hypertension, decent control. · Hyperlipidemia, not on statin therapy. · Hypothyroidism, on replacement therapy. · Recurrent mechanical falls. · Rheumatoid arthritis, on Plaquenil therapy. · Mild carotid artery disease by US Jan 2022. · GERD. Hiatal hernia. History of gastric ulcers. · DNR-CC code status. RECOMMENDATIONS:  · Echocardiogram from earlier this month reviewed -- no need for repeat at this time. · Recommend conservative cardiac medical therapy in setting of patient's advanced age, comorbidities, and advanced dementia. · Rest as per primary service and other consultants. · Further recommendations to follow as per / Katy Simms. The above case and recommendations have been discussed and made in collaboration with Dr. Katy Simms. NOTE: This report was transcribed using voice recognition software. Every effort was made to ensure accuracy; however, inadvertent computerized transcription errors may be present. Lulu Alamo, [address] [place] [address], [facility]    Electronically signed by Coral Martinez PA-C on 1/24/2022 at 10:54 AM   ______________________________________________________________________  Cardiology attending attestation:  I have independently interviewed and examined the patient. I personally reviewed pertinent laboratory values and diagnostic testing (including, if applicable, chest xray, electrocardiogram, telemetry, echocardiogram, stress testing, and coronary angiography).   I have reviewed the above documentation completed by Lulu Alamo PA-C including past medical, social, and family history and agree with the findings, assessment and plan except where noted. Plan formulated under my direct supervision. I participated in all aspects of the medical decision making. Please see my additional contributions to the HPI, physical exam, and assessment / medical decision making:  _______________________________________________________________________    Detailed history unobtainable from the patient. She has baseline dementia. She denies chest pain or shortness of breath at this time. She had mildly elevated troponin that is trending down in the setting of significant LATOYA. EKG shows no ischemic changes. Recent echo showed normal LV systolic function. Physical Exam:  BP (!) 145/81   Pulse 103   Temp 97.9 °F (36.6 °C) (Oral)   Resp 16   Ht 5' (1.524 m)   Wt 112 lb (50.8 kg)   SpO2 97%   BMI 21.87 kg/m²   General appearance: Elderly female, awake, confused, no acute respiratory distress  Skin: Intact, no rash  ENMT: Moist mucous membranes  Neck: Supple, no carotid bruits. Normal jugular venous pressure  Lungs: Clear to auscultation bilaterally. No wheezes, rales, or rhonchi  Cardiac: Regular rhythm with a normal rate, normal S1&S2, no murmurs apparent  Abdomen: Soft, positive bowel sounds, nontender  Extremities: Moves all extremities x 4, no lower extremity edema  Neurologic: No focal motor deficits apparent, normal mood and affect    EK2022: Sinus rhythm with PACs 93 bpm.  Normal axis normal intervals. Nonspecific T wave changes. Impression:   1. Elevated hs-cTnT: Likely acute and chronic myocardial injury in the setting of LATOYA/CKD. No evidence of ACS  2. DNR CC    Recommendations: There is no evidence of acute ischemia.  No further cardiac testing indicated   Agree with IV hydration   Further care per primary   Will see as needed, please call with questions    Thank you for the consultation. Please do not hesitate to call with questions.     Cj Jhaveri MD, [facility]

## 2022-01-24 NOTE — PROGRESS NOTES
Anais Toscano,    Your patient is on a medication that requires a renal dose adjustment. Renal Function Assessment:    Date Body Weight IBW Adj. Body Weight SCr CrCl Dialysis status   1/24/2022 ~50.8 kg 45.5 kg 50.8 kg 2.2 13 ml/min no       Pharmacy has renally dose-adjusted the following medication(s):    Date Medication Original Dosing Regimen New Dosing Regimen   1/24/2022 enoxaparin 40mg SQ QD 30mg SQ QD           These changes were made per protocol according to the [facility] Renal Function-Based Dose Adjustments Policy    *Please note this dose may need readjusted if your patient's renal function significantly improves. Please contact pharmacy with any questions regarding these changes.

## 2022-01-25 NOTE — PLAN OF CARE
Problem: Falls - Risk of:  Goal: Will remain free from falls  Outcome: Met This Shift     Problem: Health Behavior:  Goal: Compliance with treatment plan for underlying cause of condition will improve  Outcome: Met This Shift     Problem: Falls - Risk of:  Goal: Absence of physical injury  Outcome: Not Met This Shift     Problem: Skin Integrity:  Goal: Will show no infection signs and symptoms  Outcome: Not Met This Shift     Problem: Skin Integrity:  Goal: Absence of new skin breakdown  Outcome: Not Met This Shift     Problem: Urinary Elimination:  Goal: Will remain free from infection  Outcome: Not Met This Shift

## 2022-01-25 NOTE — PROGRESS NOTES
continued skilled physical therapy to address concerns listed above for increased safety and function at discharge. PHYSICAL THERAPY  PLAN OF CARE       Physical therapy plan of care is established based on physician order,  patient diagnosis and clinical assessment    Current Treatment Recommendations:    -Bed Mobility: Lower extremity exercises   -Sitting Balance: Facilitate active trunk muscle engagement , Facilitate postural control in all planes  and Engage in core activities to allow for movement within base of support   -Standing Balance: Perform strengthening exercises in standing to promote motor control with or without upper extremity support   -Transfers: Provide instruction on proper hand and foot position for adequate transfer of weight onto lower extremities and use of gait device if needed and Cues for hand placement, technique and safety. Provide stabilization to prevent fall   -Gait: Gait training, Standing activities to improve: base of support, weight shift, weight bearing  and Pregait training to emphasize: Sequencing , Weight shift, Device control, Upright and Safety   -Endurance: Utilize Supervised activities to increase level of endurance to allow for safe functional mobility including transfers and gait     PT long term treatment goals are located in below grid    Patient and or family understand(s) diagnosis, prognosis, and plan of care. Frequency of treatments: Patient will be seen  3-5 times/week.          Prior Level of Function: Patient ambulated with wheeled walker and assist  Rehab Potential: fair  + for baseline    Past medical history:   Past Medical History:   Diagnosis Date    Abdominal tenderness of left lower quadrant     Angina pectoris ([address]     Arthritis     Bacteremia     Bacterial cellulitis     Bite of animal     Cardiac dysrhythmia     Cellulitis and abscess of upper extremity     Cellulitis of forearm     Cellulitis of lower limb     Chest wall pain  Colitis     Cough     Degenerative joint disease involving multiple joints     Dizziness and giddiness     Edema of lower extremity     Flare of rheumatoid arthritis (HCC)     Frozen shoulder     GERD (gastroesophageal reflux disease)     Hip pain, left     History of blood transfusion     Hyperlipidemia     Hypertension     Hypotensive syncope     Hypothyroidism     Laceration with foreign body of other part of head, initial encounter     Left flank pain     Mild dehydration     Mucous membrane inflammation     Multiple fractures of ribs of right side     Musculoskeletal pain     Open bite wound of skin     Oral candidiasis     Orthostatic hypotension     Other specified joint disorders, right ankle and foot     Ovarian mass     Peptic ulcer disease     Peripheral vertigo     Rheumatoid arthritis(714.0)     Right inguinal hernia     Senile osteoporosis     Spinal stenosis, lumbar region without neurogenic claudication     Spontaneous ecchymosis     Thyroid disease     Varicose veins of lower extremity      Past Surgical History:   Procedure Laterality Date    CARPAL TUNNEL RELEASE      right    CHOLECYSTECTOMY      COLONOSCOPY  2007    Dr. Meagan Grigsby. Elbow,Lt. Knee    KNEE SURGERY Left 1990    UPPER GASTROINTESTINAL ENDOSCOPY N/A 7/22/2020    EGD BIOPSY performed by Jeff Stratton MD at [address]:    Precautions:  Up with assistance, falls, alarm and dementia ,    Social history: Patient lives in a skilled nursing facility      Equipment owned: [address] [employer] at [address]   How much difficulty turning over in bed?: A Lot  How much difficulty sitting down on / standing up from a chair with arms?: A Lot  How much difficulty moving from lying on back to sitting on side of bed?: A Lot  How much help from another person moving to and from a bed to a chair?: A Lot  How much help from another person needed to walk in hospital room?: A Lot  How much help from another person for climbing 3-5 steps with a railing?: A Lot  AM-PAC Inpatient Mobility Raw Score : 12  AM-PAC Inpatient T-Scale Score : 35.33  Mobility Inpatient CMS 0-100% Score: 68.66  Mobility Inpatient CMS G-Code Modifier : CL    Nursing cleared patient for PT treatment. .   OBJECTIVE;   Initial Evaluation  Date: 1/24/2022 Treatment Date:  1/25/2022       Short Term/ Long Term   Goals   Was pt agreeable to Eval/treatment? Yes Yes   To be met in 3 days   Pain level   0/10    0/10    Bed Mobility    Rolling: Moderate assist of 1    Supine to sit: Moderate assist of 1    Sit to supine: Moderate assist of 1    Scooting: Moderate assist of 1   Rolling: Moderate assist of 1   Supine to sit: Moderate assist of 1   Sit to supine: Moderate assist of 1   Scooting: Moderate assist of 1    Rolling: Minimal assist of 1    Supine to sit: Minimal assist of 1    Sit to supine: Minimal assist of 1    Scooting: Minimal assist of 1     Transfers Sit to stand: Moderate assist of 1 Cues for hand placement and safety  Sit to stand:  Moderate assist of 1 Cues for hand placement and safety       Sit to stand: Minimal assist of 1     Ambulation    6 side steps using  wheeled walker with Moderate assist of 1   Patient with shuffling steps and flexed posture and cues for sequencing, upright posture, proper hand placement and weigth shfting 15 feet using  wheeled walker with Moderate assist of 1   cues for upright posture, walker approximation, increased base of support, increased step length, safety, pacing and pursed lip breathing    20 feet using  wheeled walker with Minimal assist of 1    Stair negotiation: ascended and descended   Not assessed          ROM Within functional limits        Strength BUE:  refer to OT eval  RLE:  3+/5  LLE:  3+/5  Increase strength in affected mm groups by 1/3 grade   Balance Sitting EOB:  fair    Dynamic Standing:  fair wheeled walker  Sitting EOB: fair   Dynamic Standing: fair minus; kyphotic posture   Sitting EOB:  good    Dynamic Standing: good -wheeled walker      Patient is Alert & Oriented x person and follows one step directions    Sensation:  Patient  denies numbness/tingling   Edema:  no   Endurance: fair       Vitals: room air   Blood Pressure at rest  Blood Pressure during session    Heart Rate at rest  Heart Rate during session     SPO2 at rest %  SPO2 during session  %     Patient education  Patient educated on role of Physical Therapy, risks of immobility, safety and plan of care,  importance of mobility while in hospital  and ankle pumps, quad set and glut set for edema control, blood clot prevention     Patient response to education:   Pt verbalized understanding Pt demonstrated skill Pt requires further education in this area   Yes Partial Yes      Treatment:  Patient practiced and was instructed/facilitated in the following treatment: Patient assisted to edge of bed, Sat edge of bed 20 minutes with Minimal assist of 1 to increase dynamic sitting balance and activity tolerance. Worked on sitting balance due to posterior lean, performed seated exercises, assisted to the restroom, assisted on/off toilet, used neisha stedy to transfer from the restroom back to the bed, pt static standing in neisha stedy in order for nursing to change several dressings, pt returned to supine. Therapeutic Exercises:  ankle pumps, hip abduction/adduction, long arc quad and seated marching,  x 10 - 15 reps. At end of session, patient in bed with alarm call light and phone within reach,  all lines and tubes intact, nursing notified. Patient would benefit from continued skilled Physical Therapy to improve functional independence and quality of life.          Patient's/ family goals   none stated    Time in 10:56  Time out  11:35    Total Treatment Time 39 minutes        CPT codes:    Therapeutic activities (85977)   29 minutes  2 unit(s)  Therapeutic exercises (39866)   10 minutes  1 unit(s)   Kang Rodriguez  [facility]  LIC # [id number]

## 2022-01-25 NOTE — DISCHARGE SUMMARY
Internal Medicine Progress Note     ANNA=[facility]     Pema Ann., F.A.C.O.I. Jose Garvey D.O., F.A.C.O.IChad Rosa D.O. Reuel Soulier, MSN, APRN, NP-C  Erika Peacock. Isidro Braswell, MSN, APRN-CNP       Internal Medicine  Discharge Summary    NAME: Mary Kessler  :  1929  MRN:  [id number]  [id number],   ADMITTED: 2022      DISCHARGED: 22    ADMITTING PHYSICIAN: Chinedu Luis DO    CONSULTANT(S):   IP CONSULT TO INTERNAL MEDICINE  IP CONSULT TO INTERNAL MEDICINE  IP CONSULT TO SOCIAL WORK  IP CONSULT TO CARDIOLOGY     ADMITTING DIAGNOSIS:   LATOYA (acute kidney injury) ([facility] [address] [N17.9]     DISCHARGE DIAGNOSES:   1. Acute kidney injury superimposed on chronic kidney disease stage IIIb-IV   2. Elevated cardiac enzymes with recent admission for syncope with reported bradycardia dysrhythmia with plans for conservative measures at this point  3. COPD now requiring nasal cannula oxygen  4. Dementia, advanced with transient alterations in awareness that resolved prior to arrival   5. Rheumatoid arthritis  6. Hypertension  7. Hyperlipidemia  8. Hypothyroidism    BRIEF HISTORY OF PRESENT ILLNESS:   Fely Sevilla is a 55-year-old female patient who presented to [address] [place] [address] emergency department for altered mental status. By the time EMS had arrived reportedly the patient was back at her baseline with advanced dementia and chronic confusion. She was found to be in acute on chronic renal failure with elevated cardiac enzyme and was subsequent admitted to the service of Dr. Kia Delgado for further care management. Internal medicine consultation has been requested upon admission.       Patient is seen and examined at bedside today. She is awake, pleasant and cooperative but confused as per her baseline. No abnormal behaviors appreciated however this does limit the collection of subjective data.     LABS[de-identified]  Lab Results   Component Value Date    WBC 9.6 01/23/2022    HGB 13.2 01/23/2022    HCT 43.2 01/23/2022     01/23/2022     01/25/2022    K 4.4 01/25/2022     (H) 01/25/2022    CREATININE 1.5 (H) 01/25/2022    BUN 56 (H) 01/25/2022    CO2 14 (L) 01/25/2022    GLUCOSE 62 (L) 01/25/2022    ALT 17 01/23/2022    AST 40 (H) 01/23/2022    INR 1.0 12/31/2021     Lab Results   Component Value Date    INR 1.0 12/31/2021    INR 1.0 07/21/2020    INR 1.0 12/16/2017    PROTIME 11.6 12/31/2021    PROTIME 12.0 07/21/2020    PROTIME 11.2 12/16/2017      Lab Results   Component Value Date    TSH 4.080 01/06/2022     Lab Results   Component Value Date    TRIG 144 01/06/2022    TRIG 194 (H) 06/22/2021    TRIG 64 07/20/2016     Lab Results   Component Value Date    HDL 60 01/06/2022    HDL 81 06/22/2021     07/20/2016     Lab Results   Component Value Date    LDLCALC 157 (H) 01/06/2022    LDLCALC 140 (H) 06/22/2021    LDLCALC 90 07/20/2016     Lab Results   Component Value Date    LABA1C 5.2 01/06/2022       IMAGING:  Echo Complete    Result Date: 1/5/2022  Transthoracic Echocardiography Report (TTE)  Demographics   Patient Name      Luciana Cimarron  Gender              Female                    A   Medical Record    [id number]      Room Number         13  Number   Account #         [de-identified]     Procedure Date      01/05/2022   Corporate ID                    Ordering Physician   Accession Number  [id number]    Referring Physician   Date of Birth     08/04/1929    Sonographer         Bri Irby RD   Age               80 year(s)    Interpreting        Greer Espinosa DO                                  Physician                                   Any Other  Procedure Type of Study   TTE procedure  Procedure Date Date: 01/05/2022 Start: 02:21 PM Study Location: Echo Lab Technical Quality: Adequate visualization Indications:Syncope and Bradycardia.  Patient Status: Routine Height: 60 inches Weight: 112 pounds BSA: 1.46 m^2 BMI: 21.87 kg/m^2 Rhythm: Within normal limits HR: 83 bpm  Findings   Left Ventricle  Left ventricular size is grossly normal.  No evidence of left ventricular mass or thrombus noted. Normal left ventricular wall thickness. Ejection fraction is visually estimated at 68%. No evidence of left ventricular mass or thrombus noted. Right Ventricle  Normal right ventricular size and function. Left Atrium  The left atrium is mild-moderately dilated. Interatrial septum appears intact. Right Atrium  Normal right atrium size. Mitral Valve  Physiologic and/or trace mitral regurgitation is present. No evidence of mitral valve stenosis. Mitral annular calcification is present. Tricuspid Valve  Physiologic and/or trace tricuspid regurgitation. RVSP is 33 mmHg. Pulmonary hypertension is mild . Aortic Valve  Aortic valve opens well. The aortic valve is trileaflet. No evidence of aortic valve regurgitation. No hemodynamically significant aortic stenosis is present. Pulmonic Valve  The pulmonic valve was not well visualized. Pericardial Effusion  No evidence of pericardial effusion. Aorta  The aorta is within normal limits. Miscellaneous  Regular rhythm. Conclusions   Summary  Left ventricular size is grossly normal.  No evidence of left ventricular mass or thrombus noted. Normal left ventricular wall thickness. Ejection fraction is visually estimated at 68%. No evidence of left ventricular mass or thrombus noted. The left atrium is mild-moderately dilated. Interatrial septum appears intact. Physiologic and/or trace mitral regurgitation is present. No evidence of mitral valve stenosis. Mitral annular calcification is present. Physiologic and/or trace tricuspid regurgitation. RVSP is 33 mmHg. Pulmonary hypertension is mild . The pulmonic valve was not well visualized. Regular rhythm.    Signature   ---------------------------------------------------------------- Electronically signed by Holger Fernandes DO(Interpreting  physician) on 01/05/2022 06:27 PM  ----------------------------------------------------------------  M-Mode/2D Measurements & Calculations   LV Diastolic    LV Systolic Dimension: 2.1  AV Cusp Separation: 1.7 cmLA  Dimension: 3.4  cm                          Dimension: 2.7 cmAO Root  cm              LV Volume Diastolic: 46 ml  Dimension: 2.6 cm  LV FS:38.2 %    LV Volume Systolic: 69.6 ml  LV PW           LV EDV/LV EDV Index: 46  Diastolic: 1 cm WH/87 YC/Y^2FL ESV/LV ESV  Septum          Index: 14.9 ml/10ml/ m^2    RV Diastolic Dimension: 2.2 cm  Diastolic: 1 cm EF Calculated: 67.6 %  CO: 5 l/min     LV Mass Index: 68 l/min*m^2  CI: 3.42                                    LA volume/Index: 65.5 ml  l/m*m^2  LV Mass: 98.91  LVOT: 1.8 cm  g  Doppler Measurements & Calculations   MV Peak E-Wave: 0.8  AV Peak Velocity: 2    LVOT Peak Velocity: 1.12 m/s  m/s                  m/s                    LVOT Mean Velocity: 0.89 m/s  MV Peak A-Wave: 0.96 AV Peak Gradient:      LVOT Peak Gradient: 5 mmHgLVOT  m/s                  15.98 mmHg             Mean Gradient: 3.4 mmHg  MV E/A Ratio: 0.84   AV Mean Velocity: 1.38  MV Peak Gradient:    m/s  4.2 mmHg             AV Mean Gradient: 9.2  MV Mean Gradient:    mmHg                   TR Velocity:2.66 m/s  1.7 mmHg             AV VTI: 33 cm          TR Gradient:28.34 mmHg  MV Mean Velocity:    AV Area                PV Peak Velocity: 1.19 m/s  0.61 m/s             (Continuity):1.83 cm^2 PV Peak Gradient: 5.63 mmHg  MV Deceleration                             PV Mean Velocity: 0.91 m/s  Time: 298.5 msec     LVOT VTI: 23.7 cm      PV Mean Gradient: 3.5 mmHg  MV P1/2t: 90.3 msec  IVRT: 100.3 msec  MVA by PHT:2.44 cm^2  MV Area  (continuity): 2.4  cm^2  [email or web address] [id number]    XR CHEST (2 VW)    Result Date: 12/31/2021  EXAMINATION: TWO XRAY VIEWS OF THE CHEST 12/31/2021 11:37 am COMPARISON: October 9, 2020 HISTORY: ORDERING SYSTEM PROVIDED HISTORY: syncope TECHNOLOGIST PROVIDED HISTORY: Reason for exam:->syncope FINDINGS: Stable cardiomegaly. Opacities are present in lung bases on lateral view which silhouette costophrenic sulcus. No pneumothorax. Chronic interstitial changes bilaterally. 1. Lateral view shows findings opacities in bilateral costophrenic sulci which could indicate pleural effusions. Short-term follow-up may be helpful for further evaluation. 2. Stable cardiomegaly. XR PELVIS (1-2 VIEWS)    Result Date: 1/1/2022  EXAMINATION: ONE XRAY VIEW OF THE PELVIS 1/1/2022 11:44 am COMPARISON: February 17, 2019 HISTORY: ORDERING SYSTEM PROVIDED HISTORY: fall TECHNOLOGIST PROVIDED HISTORY: Reason for exam:->fall FINDINGS: Generalized osteopenia. No evidence of pelvis fracture. No fracture involving right or left hip. No evidence of hip dislocation. Significant degenerative endplate changes involving visualized lumbar spine. No evidence of hip fracture or pelvis fracture. CT Head WO Contrast    Result Date: 1/23/2022  EXAMINATION: CT OF THE HEAD WITHOUT CONTRAST  1/23/2022 10:06 pm TECHNIQUE: CT of the head was performed without the administration of intravenous contrast. Dose modulation, iterative reconstruction, and/or weight based adjustment of the mA/kV was utilized to reduce the radiation dose to as low as reasonably achievable. COMPARISON: 01/05/2022 HISTORY: ORDERING SYSTEM PROVIDED HISTORY: AMS TECHNOLOGIST PROVIDED HISTORY: Reason for exam:->AMS Has a \"code stroke\" or \"stroke alert\" been called? ->No Decision Support Exception - unselect if not a suspected or confirmed emergency medical condition->Emergency Medical Condition (MA) FINDINGS: There is no acute infarction, intracranial hemorrhage or intracranial mass lesion.  No mass effect, midline shift or extra-axial collection is noted. There are moderate nonspecific foci of periventricular and subcortical cerebral white matter hypodensity, most likely representing chronic microangiopathic disease in this age group. The brain parenchyma is otherwise normal. The cerebellar tonsils are in normal position. The ventricles, sulci, and cisterns are prominent suggestive of moderate to severe generalized volume loss. The globes and orbits are within normal limits. Moderate severe mucosal thickening of right maxillary sinus. The visualized extracranial structures including paranasal sinuses and mastoid air cells are otherwise unremarkable. No fracture is identified. Stable study. No evidence for acute intracranial hemorrhage, territorial infarction or intracranial mass lesion. Moderate chronic microangiopathic ischemic disease. Moderate to severe generalized volume loss. Moderate severe mucosal thickening of right maxillary sinus. CT Head WO Contrast    Result Date: 1/5/2022  EXAMINATION: CT OF THE HEAD WITHOUT CONTRAST  1/5/2022 12:54 pm TECHNIQUE: CT of the head was performed without the administration of intravenous contrast. Dose modulation, iterative reconstruction, and/or weight based adjustment of the mA/kV was utilized to reduce the radiation dose to as low as reasonably achievable. COMPARISON: 4 days HISTORY: ORDERING SYSTEM PROVIDED HISTORY: syncope TECHNOLOGIST PROVIDED HISTORY: Reason for exam:->syncope Has a \"code stroke\" or \"stroke alert\" been called? ->No Decision Support Exception - unselect if not a suspected or confirmed emergency medical condition->Emergency Medical Condition (MA) FINDINGS: BRAIN/VENTRICLES: Ventricular system is prominent, may represent normal pressure hydrocephalus or be due to the volume loss. There is moderate to severe chronic small vessel ischemic white matter disease. No acute hemorrhage or mass effect. ORBITS: The visualized portion of the orbits demonstrate no acute abnormality.  SINUSES: Near complete opacification of the right maxillary sinus is similar to previously SOFT TISSUES/SKULL:  No acute abnormality of the visualized skull or soft tissues. No acute intracranial abnormality. CT Head WO Contrast    Result Date: 1/1/2022  EXAMINATION: CT OF THE HEAD WITHOUT CONTRAST  1/1/2022 11:40 am TECHNIQUE: CT of the head was performed without the administration of intravenous contrast. Dose modulation, iterative reconstruction, and/or weight based adjustment of the mA/kV was utilized to reduce the radiation dose to as low as reasonably achievable. COMPARISON: None. HISTORY: ORDERING SYSTEM PROVIDED HISTORY: fall TECHNOLOGIST PROVIDED HISTORY: Reason for exam:->fall Has a \"code stroke\" or \"stroke alert\" been called? ->No Decision Support Exception - unselect if not a suspected or confirmed emergency medical condition->Emergency Medical Condition (MA) FINDINGS: BRAIN/VENTRICLES: There is no acute intracranial hemorrhage, mass effect or midline shift. No abnormal extra-axial fluid collection. The gray-white differentiation is maintained without evidence of an acute infarct. There is no evidence of hydrocephalus. There is dilation of the ventricles and sulci compatible underlying signs of cerebral and cerebellar atrophy. ORBITS: The visualized portion of the orbits demonstrate no acute abnormality. There signs of bilateral lens replacement SINUSES: The visualized paranasal sinuses reveal opacification of the maxillary sinus suggests chronic sinusitis. The mastoid air cells demonstrate no acute abnormality. SOFT TISSUES/SKULL:  No acute abnormality of the visualized skull or soft tissues. 1.  No acute intracranial abnormality. 2.  Underlying signs of cerebral and cerebellar 3. Signs of deep white matter small vessel disease 4.   Chronic appearing right maxillary sinusitis RECOMMENDATIONS: Unavailable     CT HEAD WO CONTRAST    Result Date: 12/31/2021  EXAMINATION: CT OF THE HEAD WITHOUT CONTRAST 12/31/2021 11:20 am TECHNIQUE: CT of the head was performed without the administration of intravenous contrast. Dose modulation, iterative reconstruction, and/or weight based adjustment of the mA/kV was utilized to reduce the radiation dose to as low as reasonably achievable. COMPARISON: 07/21/2020 HISTORY: ORDERING SYSTEM PROVIDED HISTORY: Trauma TECHNOLOGIST PROVIDED HISTORY: Has a \"code stroke\" or \"stroke alert\" been called? ->No Reason for exam:->Trauma Decision Support Exception - unselect if not a suspected or confirmed emergency medical condition->Emergency Medical Condition (MA) FINDINGS: BRAIN/VENTRICLES: There is no acute intracranial hemorrhage, mass effect or midline shift. No abnormal extra-axial fluid collection. There is extensive periventricular low-density that suggest microangiopathy. There is dilation of the ventricles and sulci that could represent underlying signs of cerebral and cerebellar atrophy. The gray-white differentiation is maintained without evidence of an acute infarct. There is no evidence of hydrocephalus. ORBITS: The visualized portion of the orbits demonstrate no acute abnormality. SINUSES: The visualized paranasal sinuses reveal an air-fluid level within the right maxillary sinus suggests acute sinusitis. Mastoid air cells demonstrate no acute abnormality. SOFT TISSUES/SKULL:  No acute abnormality of the visualized skull or soft tissues. 1.  No acute intracranial abnormality. 2.  Prominence of the ventricles and sulci suggestive underlying signs of cerebral and cerebellar atrophy. 3.  Evidence of deep white matter small vessel disease 4.   Acute right maxillary sinusitis RECOMMENDATIONS: Unavailable     CT Cervical Spine WO Contrast    Result Date: 1/1/2022  EXAMINATION: CT OF THE CERVICAL SPINE WITHOUT CONTRAST 1/1/2022 10:40 am TECHNIQUE: CT of the cervical spine was performed without the administration of intravenous contrast. Multiplanar reformatted images are provided for review. Dose modulation, iterative reconstruction, and/or weight based adjustment of the mA/kV was utilized to reduce the radiation dose to as low as reasonably achievable. COMPARISON: None. HISTORY: ORDERING SYSTEM PROVIDED HISTORY: fall TECHNOLOGIST PROVIDED HISTORY: Reason for exam:->fall Decision Support Exception - unselect if not a suspected or confirmed emergency medical condition->Emergency Medical Condition (MA) FINDINGS: BONES/ALIGNMENT: There is moderate cervical levoscoliosis. There is mild basal invagination of the dens into the foramen magnum which appears chronic. 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 C3 retrolisthesis 3 mm relative to C2 and C4. There are no signs of acute fracture or dislocation. The visualized portions of the skull base and mastoid sinuses are normal. DEGENERATIVE CHANGES:  Moderate degenerative disc narrowing and spondylosis is present at C3-4 through C6-7. The facet joints are moderately degenerated and hypertrophied. There is moderate degenerative remodeling of the atlantoaxial joint. There is moderate to marked right and moderate left mid and lower cervical foraminal narrowing. SOFT TISSUES: The visualized brain is unremarkable aside from moderate volume loss. The paravertebral soft tissues are normal.  The nasopharynx, oropharynx and hypopharynx appear normal.  There is focal fibrosis and calcification of the lung apices, likely reflecting fibrotic sequela of granulomatous disease. 1.  No sign of acute traumatic injury. 2. 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 3. Advanced mid and lower lumbar degenerative disc disease and spondylosis with moderate levoscoliosis.  RECOMMENDATIONS: Unavailable     XR CHEST PORTABLE    Result Date: 1/23/2022  EXAMINATION: ONE XRAY VIEW OF THE CHEST 1/23/2022 10:28 pm COMPARISON: 01/15/2022 HISTORY: ORDERING SYSTEM PROVIDED HISTORY: r/o pneumonia TECHNOLOGIST PROVIDED HISTORY: Reason for exam:->r/o pneumonia FINDINGS: The lungs are underinflated, resulting in vascular crowding and subsegmental atelectasis. The exam is rotated, slightly limiting evaluation. No evidence of focal consolidation. No sizable effusion or pneumothorax. The cardiac silhouette and mediastinal contours are within normal limits for technique and positioning. Stable appearance of the osseous structures without acute bony abnormality. No acute process. XR CHEST PORTABLE    Result Date: 1/15/2022  EXAMINATION: ONE XRAY VIEW OF THE CHEST 1/15/2022 9:45 pm COMPARISON: January 5, 2022 HISTORY: ORDERING SYSTEM PROVIDED HISTORY: shortness of breath TECHNOLOGIST PROVIDED HISTORY: Reason for exam:->shortness of breath FINDINGS: Chronic interstitial opacities bilaterally. No focal airspace opacity or pleural effusion. The heart is normal size. No pneumothorax. 1. Chronic interstitial opacities bilaterally. 2. No obvious airspace opacity or pleural effusion. XR CHEST PORTABLE    Result Date: 1/5/2022  EXAMINATION: ONE XRAY VIEW OF THE CHEST 1/5/2022 11:26 am COMPARISON: Multiple priors, most recent from January 1, 2022. HISTORY: ORDERING SYSTEM PROVIDED HISTORY: syncope TECHNOLOGIST PROVIDED HISTORY: Reason for exam:->syncope FINDINGS: Heart size is unable to be accurately assessed on this single portable view of the chest, but appears to be stable. There is partial obscuration of left hemidiaphragm, suggesting left lower lobe atelectasis or pneumonia. Otherwise, no evidence of a focal airspace opacity. Difficult to exclude a small left pleural effusion. No pneumothorax. Bones are diffusely osteopenic. Partial obscuration of left hemidiaphragm suggesting left lower lobe atelectasis or pneumonia. Difficult to exclude small left pleural effusion.      XR CHEST 1 VIEW    Result Date: 1/1/2022  EXAMINATION: ONE XRAY VIEW OF THE CHEST 1/1/2022 10:44 am COMPARISON: 12/31/2021 HISTORY: ORDERING 0.5     The right internal carotid artery demonstrates 0-50% stenosis. The left internal carotid artery demonstrates 0-50% stenosis. Right vertebral artery not visible, left appears patent with normal direction of flow. RECOMMENDATIONS: Unavailable         HOSPITAL COURSE:   Chuy Poon presented to the hospital and was found to be suffering from mild renal insufficiency on top of chronic kidney disease. She is on multiple renal toxic medications and these have been adjusted to better avoid renal dysfunction moving forward. She was evaluated by the cardiology team with plans for conservative measures and we certainly agree. Otherwise, her confusion stabilized. She worked with the therapy teams. She is DNR-CC and does not request any aggressive intervention. Following fluid resuscitation her renal function improved. She was essentially deemed acceptable to return to the nursing home facility. BRIEF PHYSICAL EXAMINATION AND LABORATORIES ON DAY OF DISCHARGE:  VITALS:  /79   Pulse 78   Temp 98 °F (36.7 °C) (Oral)   Resp 20   Ht 5' (1.524 m)   Wt 112 lb (50.8 kg)   SpO2 98%   BMI 21.87 kg/m²     HEENT:  PERRLA. EOMI. Sclera clear. Buccal mucosa moist.    Neck:  Supple. Trachea midline. No thyromegaly. No JVD. No bruits. Heart:  Rhythm regular, rate controlled. Systolic murmur. Lungs:  Symmetrical. Clear to auscultation bilaterally. No wheezes. No rhonchi. No rales. Abdomen: Soft. Non-tender. Non-distended. Bowel sounds positive. No organomegaly or masses. No pain on palpation    Extremities:  Peripheral pulses present. No peripheral edema. No ulcers. Neurologic:  Alert x 3. No focal deficit. Cranial nerves grossly intact. Mildly confused compatible with patient's known dementia    Skin:  No petechia. No hemorrhage. No wounds. DISPOSITION:  The patient's condition is good.   At this time the patient is without objective evidence of an acute process requiring continuing hospitalization or inpatient management. They are stable for discharge with outpatient follow-up. I have spoken with the patient and discussed the results of the current hospitalization, in addition to providing specific details for the plan of care and counseling regarding the diagnosis and prognosis. The plan has been discussed in detail and they are aware of the specific conditions for emergent return, as well as the importance of follow-up. Their questions are answered at this time and they are agreeable with the plan for discharge to nursing home    DISCHARGE MEDICATIONS:   Current Discharge Medication List           Details   amLODIPine (NORVASC) 2.5 MG tablet Take 1 tablet by mouth daily  Qty: 30 tablet, Refills: 3              Details   albuterol sulfate HFA (PROAIR HFA) 108 (90 Base) MCG/ACT inhaler Inhale 2 puffs into the lungs every 6 hours as needed for Wheezing  Qty: 18 g, Refills: 0      Prenatal-FeCbn-FeAspGl-FA-Omeg (FOLCAPS OMEGA 3 PO) Take 1 tablet by mouth daily      Ferrous Sulfate (IRON PO) Take by mouth      clotrimazole (MYCELEX) 10 MG trevin Take 10 mg by mouth every 4 hours      hydroxychloroquine (PLAQUENIL) 200 MG tablet Take 200 mg by mouth daily      zoledronic acid (RECLAST) 5 MG/100ML SOLN Infuse 5 mg intravenously once      acetaminophen (TYLENOL) 500 MG tablet Take 1 tablet by mouth 2 times daily as needed for Pain or Fever 2x daily  Qty: 120 tablet, Refills: 3      pantoprazole (PROTONIX) 40 MG tablet Take 1 tablet by mouth every morning (before breakfast)  Qty: 30 tablet, Refills: 3      gabapentin (NEURONTIN) 100 MG capsule Take 100 mg by mouth 2 times daily at 0800 and 1400.       polyethylene glycol (GLYCOLAX) powder Take 17 g by mouth daily      predniSONE (DELTASONE) 2.5 MG tablet Take 2.5 mg by mouth daily      Omega-3 Fatty Acids (FISH OIL) 1000 MG CAPS Take 1,000 mg by mouth daily      Multiple Vitamins-Minerals (OCUVITE ADULT 50+ PO) Take 1 tablet by mouth daily levothyroxine (SYNTHROID) 75 MCG tablet Take 75 mcg by mouth Daily. Vitamin D (CHOLECALCIFEROL) 1000 UNITS CAPS capsule Take 1,000 Units by mouth daily. FOLLOW UP/INSTRUCTIONS:  · This patient is instructed to follow-up with her primary care physician. · Patient is instructed to follow-up with the consults listed above as directed by them. · she is instructed to resume home medications and take new medications as indicated in the list above. · If the patient has a recurrence of symptoms, she is instructed to go to the ED. Preparing for this patient's discharge, including paperwork, orders, instructions, and meeting with patient did require > 40 minutes.     Prisca Mckeon DO   1/25/2022  7:10 AM

## 2022-01-25 NOTE — CARE COORDINATION
1/25/2022: SS Note/Discharge plan: Observation:  Notified by nursing of qualifying testing and home 02 order for d/c today, notified Sheila Brito at [address] [place] [address] who relayed that they had already called [employer] prior to pt's admission but company was waiting for testing and orders, Amaya Mata at [facility] of 02 order and confirmed 02 delivery prior to pt's discharge, transfer scheduled with physicians ambulance for 6:30pm, informed by Desiree Flores at [facility] that they are able to accept referral for home PT/OT and will make arrangements for therapy visits with facility, Sheila Brito, pt, pt's son and nursing notified of arrangements.  Electronically signed by JAMEL Gamble on 1/25/2022 at 11:36 AM

## 2022-01-25 NOTE — PROGRESS NOTES
Pulse ox was 96% on room air at rest. Ambulated patient on room air. Oxygen saturation was 84-76% on room air while ambulating. Oxygen applied. Recovery pulse ox was 96% on 2 liters of oxygen while ambulating.

## 2022-01-25 NOTE — H&P
Department of Family Practice  Attending History and Physical        CHIEF COMPLAINT:  Altered mental status    Reason for Admission:  ACUTE KIDNEY INJURY; DEHYDRATION    History Obtained From:  electronic medical record, reason patient could not give history:  altered mental status, Quality of history:  poor historian    HISTORY OF PRESENT ILLNESS:      The patient is a 80 y.o. female with significant past medical history of DEMENTIA who presents with MENTAL STATUS CHANGE, DEHYDRATION AND ACUTE KIDNEY INJURY IS ADMITTED FROM THE ED.  SHE HAS DEMENTIA AND HAS NO COMPLAINTS. HER TROPONIN IS ALSO ELEVATED AND POTASSIUM IS ELEVATED. SHE HAS BEEN EVALUATED BY CARDIOLOGY ALREADY AND CONSERVATIVE MANAGEMENT IS RECOMMENDED.   SHE IS A DNRCC. (I AM NOT SURE WHY SHE WAS EVEN SENT TO THE HOSPITAL WITH THAT CODE STATUS)    Past Medical History:        Diagnosis Date    Abdominal tenderness of left lower quadrant     Angina pectoris (HCC)     Arthritis     Bacteremia     Bacterial cellulitis     Bite of animal     Cardiac dysrhythmia     Cellulitis and abscess of upper extremity     Cellulitis of forearm     Cellulitis of lower limb     Chest wall pain     Colitis     Cough     Degenerative joint disease involving multiple joints     Dizziness and giddiness     Edema of lower extremity     Flare of rheumatoid arthritis (HCC)     Frozen shoulder     GERD (gastroesophageal reflux disease)     Hip pain, left     History of blood transfusion     Hyperlipidemia     Hypertension     Hypotensive syncope     Hypothyroidism     Laceration with foreign body of other part of head, initial encounter     Left flank pain     Mild dehydration     Mucous membrane inflammation     Multiple fractures of ribs of right side     Musculoskeletal pain     Open bite wound of skin     Oral candidiasis     Orthostatic hypotension     Other specified joint disorders, right ankle and foot     Ovarian mass     Peptic ulcer disease     Peripheral vertigo     Rheumatoid arthritis(714.0)     Right inguinal hernia     Senile osteoporosis     Spinal stenosis, lumbar region without neurogenic claudication     Spontaneous ecchymosis     Thyroid disease     Varicose veins of lower extremity      Past Surgical History:        Procedure Laterality Date    CARPAL TUNNEL RELEASE      right    CHOLECYSTECTOMY      COLONOSCOPY  2007    Dr. Juan Luis Clemons. Elbow,Lt. Knee    KNEE SURGERY Left 1990    UPPER GASTROINTESTINAL ENDOSCOPY N/A 7/22/2020    EGD BIOPSY performed by Rea Ferguson MD at [address]:              Influenza: Indicated for current flu vaccination season Oct. to Feb.            Pneumococcal Polysaccharide:  Indicated for current flu vaccination season Oct. to Feb.    Medications Prior to Admission:   Medications Prior to Admission: albuterol sulfate HFA (PROAIR HFA) 108 (90 Base) MCG/ACT inhaler, Inhale 2 puffs into the lungs every 6 hours as needed for Wheezing  Prenatal-FeCbn-FeAspGl-FA-Omeg (FOLCAPS OMEGA 3 PO), Take 1 tablet by mouth daily  Ferrous Sulfate (IRON PO), Take by mouth  furosemide (LASIX) 20 MG tablet, Take 20 mg by mouth daily 1/2 TAB BY MOUTH  clotrimazole (MYCELEX) 10 MG trevin, Take 10 mg by mouth every 4 hours  hydroxychloroquine (PLAQUENIL) 200 MG tablet, Take 200 mg by mouth daily  zoledronic acid (RECLAST) 5 MG/100ML SOLN, Infuse 5 mg intravenously once  acetaminophen (TYLENOL) 500 MG tablet, Take 1 tablet by mouth 2 times daily as needed for Pain or Fever 2x daily  pantoprazole (PROTONIX) 40 MG tablet, Take 1 tablet by mouth every morning (before breakfast)  losartan (COZAAR) 50 MG tablet, Take 1 tablet by mouth daily Hold until systolic is greater then 236  gabapentin (NEURONTIN) 100 MG capsule, Take 100 mg by mouth 2 times daily at 0800 and 1400.   polyethylene glycol (GLYCOLAX) powder, Take 17 g by mouth daily  predniSONE (DELTASONE) 2.5 MG tablet, Take 2.5 mg by mouth daily  Omega-3 Fatty Acids (FISH OIL) 1000 MG CAPS, Take 1,000 mg by mouth daily  Multiple Vitamins-Minerals (OCUVITE ADULT 50+ PO), Take 1 tablet by mouth daily  levothyroxine (SYNTHROID) 75 MCG tablet, Take 75 mcg by mouth Daily. Vitamin D (CHOLECALCIFEROL) 1000 UNITS CAPS capsule, Take 1,000 Units by mouth daily. Allergies:  Levofloxacin, Bactrim [sulfamethoxazole-trimethoprim], Erythromycin, Amoxicillin, Celebrex [celecoxib], Ciprofloxacin, Darvon [propoxyphene], Levaquin [levofloxacin], Clinoril [sulindac], Codeine, and Morphine    Social History:   TOBACCO:   reports that she has never smoked. She has never used smokeless tobacco.  ETOH:   reports no history of alcohol use. DRUGS:   reports no history of drug use. Family History:       Problem Relation Age of Onset    Heart Disease Brother      REVIEW OF SYSTEMS:  CONSTITUTIONAL:  negative  RESPIRATORY:  negative  CARDIOVASCULAR:  negative  GASTROINTESTINAL:  negative  MUSCULOSKELETAL:  FALLS OFTEN  BEHAVIOR/PSYCH:  HAS DEMENTIA  PHYSICAL EXAM:    Vitals:  BP (!) 104/55   Pulse 95   Temp 97.7 °F (36.5 °C) (Oral)   Resp 16   Ht 5' (1.524 m)   Wt 112 lb (50.8 kg)   SpO2 95%   BMI 21.87 kg/m²     CONSTITUTIONAL:  awake, alert, cooperative, no apparent distress, and appears stated age  ENT:  Normocephalic, without obvious abnormality, atraumatic, sinuses nontender on palpation, external ears without lesions, oral pharynx with moist mucus membranes, tonsils without erythema or exudates, gums normal and good dentition.   BACK:  Symmetric, no curvature, spinous processes are non-tender on palpation, paraspinous muscles are non-tender on palpation, no costal vertebral tenderness  LUNGS:  No increased work of breathing, good air exchange, clear to auscultation bilaterally, no crackles or wheezing  CARDIOVASCULAR:  Normal apical impulse, regular rate and rhythm, normal S1 and S2, no S3 or S4, and no murmur noted  ABDOMEN:  No scars, normal bowel sounds, soft, non-distended, non-tender, no masses palpated, no hepatosplenomegally  MUSCULOSKELETAL:  NO EDEMA  SKIN:  BRUISING ON CHEST    DATA:  CBC with Differential:    Lab Results   Component Value Date    WBC 9.6 01/23/2022    RBC 4.26 01/23/2022    HGB 13.2 01/23/2022    HCT 43.2 01/23/2022     01/23/2022    .4 01/23/2022    MCH 31.0 01/23/2022    MCHC 30.6 01/23/2022    RDW 14.0 01/23/2022    SEGSPCT 85 03/28/2011    METASPCT 0.9 01/05/2022    LYMPHOPCT 4.4 01/23/2022    MONOPCT 6.4 01/23/2022    BASOPCT 0.1 01/23/2022    MONOSABS 0.61 01/23/2022    LYMPHSABS 0.42 01/23/2022    EOSABS 0.01 01/23/2022    BASOSABS 0.01 01/23/2022     CMP:    Lab Results   Component Value Date     01/23/2022    K 5.2 01/23/2022     01/23/2022    CO2 17 01/23/2022    BUN 74 01/23/2022    CREATININE 2.2 01/23/2022    GFRAA 25 01/23/2022    LABGLOM 21 01/23/2022    GLUCOSE 121 01/23/2022    GLUCOSE 101 03/28/2011    PROT 6.6 01/23/2022    LABALBU 3.3 01/23/2022    LABALBU 3.9 03/28/2011    CALCIUM 9.2 01/23/2022    BILITOT 0.8 01/23/2022    ALKPHOS 131 01/23/2022    AST 40 01/23/2022    ALT 17 01/23/2022     PT/INR:    Lab Results   Component Value Date    PROTIME 11.6 12/31/2021    INR 1.0 12/31/2021     Troponin:    Lab Results   Component Value Date    TROPONINI <0.01 10/09/2020     ASSESSMENT AND PLAN:      Principal Problem:    Acute renal failure ([address]  Plan: HOLD LASIX; IV FLUIDS  Active Problems:    LATOYA (acute kidney injury) ([address]  Plan: IV FLUIDS    Essential hypertension  Plan: VITALS PER UNIT PROTOCOL    Hyperkalemia  Plan: DAILY LABS    Dehydration  Plan: IV FLUIDS      INPATIENT ADMISSION, INTERNAL MEDICINE CONSULT, CARDIOLOGY CONSULT, DAILY LABS, HYDRATE, PT AND OT. DISCHARGE PLANS TO RETURN TO ASSISTED LIVING, ESTIMATED LENGTH OF STAY IS 3 DAYS.

## 2022-01-25 NOTE — CARE COORDINATION
1/25/2022: SS Note/Discharge plan: Observation:  SS Consult noted for \"d/c planning\", met with pt and spoke with pt's son/POA , Adrian Minaya by phone, explained sw role for transition of care and consult, reviewed rehab options and therapy evals/recommendations, pt's son prefers his mother return to the Assisted Living vs a temporary SNF placement for CHARITY, notified pt's nurse Lesley at [facility] who agreed to accept pt back, facility uses [address] [place] [address], referral made to [facility] for home health PT/OT, Crystal Sullivan asked if pt could be evaluated prior to d/c for possible need for 02 and pt's son request physicians ambulance provide his mother's transportation back to the AL, nursing notified, sw to follow. Electronically signed by JAMEL Cárdenas on 1/25/2022 at 9:08 AM

## 2022-01-25 NOTE — PLAN OF CARE
Problem: Falls - Risk of:  Goal: Will remain free from falls  1/25/2022 1430 by Bryce Dias RN  Outcome: Met This Shift     Problem: Falls - Risk of:  Goal: Absence of physical injury  1/25/2022 1430 by Bryce Dias RN  Outcome: Not Met This Shift     Problem: Skin Integrity:  Goal: Will show no infection signs and symptoms  1/25/2022 1430 by Bryce Dias RN  Outcome: Not Met This Shift     Problem: Skin Integrity:  Goal: Absence of new skin breakdown  1/25/2022 1430 by Bryce Dias RN  Outcome: Not Met This Shift     Problem: Health Behavior:  Goal: Compliance with treatment plan for underlying cause of condition will improve  1/25/2022 1430 by Bryce Dias RN  Outcome: Not Met This Shift     Problem: Health Behavior:  Goal: Compliance with treatment plan for underlying cause of condition will improve  1/25/2022 1430 by Bryce Dias RN  Outcome: Not Met This Shift     Problem: Urinary Elimination:  Goal: Will remain free from infection  1/25/2022 1430 by Bryce Dias RN  Outcome: Not Met This Shift

## 2022-01-25 NOTE — DISCHARGE INSTR - COC
Continuity of Care Form    Patient Name: Jhoan Hughes   :  1929  MRN:  [id number]    Admit date:  2022  Discharge date:  ***    Code Status Order: DNR-CC   Advance Directives:      Admitting Physician:  Jillian Rey DO  PCP: Jillian Rey DO    Discharging Nurse: [facility] Unit/Room#: 8169/6306-03  Discharging Unit Phone Number: ***    Emergency Contact:   Extended Emergency Contact Information  Primary Emergency Contact: [facility]  Address: [address] [facility], [address] [place] of [address] Phone: [phone]  Mobile Phone: [phone]  Relation: Child   needed? No  Secondary Emergency Contact: García [address] Phone: [phone]  Mobile Phone: [phone]  Relation: Child   needed? No    Past Surgical History:  Past Surgical History:   Procedure Laterality Date    CARPAL TUNNEL RELEASE      right    CHOLECYSTECTOMY      COLONOSCOPY      Dr. Jesika Loco. Elbow,Lt. Knee    KNEE SURGERY Left     UPPER GASTROINTESTINAL ENDOSCOPY N/A 2020    EGD BIOPSY performed by Taiwo Aparicio MD at [facility] ENDOSCOPY       Immunization History:   Immunization History   Administered Date(s) Administered    Tdap (Boostrix, Adacel) 2013, 2013, 2019       Active Problems:  Patient Active Problem List   Diagnosis Code    Osteomyelitis of toe of right foot ([facility] M86.9    Multiple rib fractures S22.49XA    RA (rheumatoid arthritis) ([facility]) M06.9    Closed fracture of nasal bone S02. 2XXA    Closed fracture of right hand S62. 91XA    Closed fracture of maxillary sinus ([address] S02.401A    Anemia D64.9    Essential hypertension I10    Gastric ulcer K25.9    Symptomatic bradycardia R00.1    Syncope and collapse R55    Stage 2 chronic kidney disease N18.2    LATOYA (acute kidney injury) ([address] N17.9    Acute renal failure ([facility] N17.9    Hyperkalemia E87.5    Dehydration E86.0       Isolation/Infection:   Isolation            No Isolation          Patient Infection Status       Infection Onset Added Last Indicated Last Indicated By Review Planned Expiration Resolved Resolved By    None active    Resolved    COVID-19 (Rule Out) 07/26/20 07/26/20 07/26/20 COVID-19 (Ordered)   07/26/20 Rule-Out Test Resulted            Nurse Assessment:  Last Vital Signs: /79   Pulse 78   Temp 98 °F (36.7 °C) (Oral)   Resp 20   Ht 5' (1.524 m)   Wt 112 lb (50.8 kg)   SpO2 98%   BMI 21.87 kg/m²     Last documented pain score (0-10 scale): Pain Level: 5  Last Weight:   Wt Readings from Last 1 Encounters:   01/24/22 112 lb (50.8 kg)     Mental Status:  {IP PT MENTAL STATUS:20030}    IV Access:  { CEASAR IV ACCESS:996898855}    Nursing Mobility/ADLs:  Walking   {CHP DME WYAY:894223220}  Transfer  {CHP DME NZCN:845311209}  Bathing  {CHP DME LUHS:450518316}  Dressing  {CHP DME UGCL:176454971}  Toileting  {CHP DME QKWA:398299961}  Feeding  {CHP DME NENK:079715748}  Med Admin  {CHP DME VJKH:055884548}  Med Delivery   { CEASAR MED Delivery:255235519}    Wound Care Documentation and Therapy:        Elimination:  Continence: Bowel: {YES / TY:57519}  Bladder: {YES / NK:03810}  Urinary Catheter: {Urinary Catheter:954558426}   Colostomy/Ileostomy/Ileal Conduit: {YES / DX:54441}       Date of Last BM: ***    Intake/Output Summary (Last 24 hours) at 1/25/2022 0709  Last data filed at 1/25/2022 0539  Gross per 24 hour   Intake 240 ml   Output 1375 ml   Net -1135 ml     I/O last 3 completed shifts:   In: 240 [P.O.:240]  Out: 6275 [Urine:1375]    Safety Concerns:     [address] Safety Concerns:527720747}    Impairments/Disabilities:      [address] Impairments/Disabilities:082256070}    Nutrition Therapy:  Current Nutrition Therapy:   [address] CEASAR Diet List:543703563}    Routes of Feeding: {CHP DME Other Feedings:705000523}  Liquids: {Slp liquid thickness:44313}  Daily Fluid Restriction: {CHP DME Yes amt example:861713807}  Last Modified Barium Swallow with Video (Video Swallowing Test): {Done Not Done DATW:626595713}    Treatments at the Time of Hospital Discharge:   Respiratory Treatments: ***  Oxygen Therapy:  {Therapy; copd oxygen:20833}  Ventilator:    {[facility] Vent UURD:662130231}    Rehab Therapies: {THERAPEUTIC INTERVENTION:0104245007}  Weight Bearing Status/Restrictions: {[facility] Weight Bearin}  Other Medical Equipment (for information only, NOT a DME order):  {EQUIPMENT:901089870}  Other Treatments: ***    Patient's personal belongings (please select all that are sent with patient):  {[facility] DME Belongings:898660670}    RN SIGNATURE:  {Esignature:505620582}    CASE MANAGEMENT/SOCIAL WORK SECTION    Inpatient Status Date: ***    Readmission Risk Assessment Score:  Readmission Risk              Risk of Unplanned Readmission:  0           Discharging to Facility/ Agency   Name:   Address:  Phone:  Fax:    Dialysis Facility (if applicable)   Name:  Address:  Dialysis Schedule:  Phone:  Fax:    / signature: {Esignature:447394521}    PHYSICIAN SECTION    Prognosis: {Prognosis:8261699489}    Condition at Discharge: [address] [place] [address] Patient Condition:296376005}    Rehab Potential (if transferring to Rehab): {Prognosis:5230029565}    Recommended Labs or Other Treatments After Discharge: ***    Physician Certification: I certify the above information and transfer of Valentino Starring  is necessary for the continuing treatment of the diagnosis listed and that she requires {Admit to Appropriate Level of Care:51179} for {GREATER/LESS:950145187} 30 days.      Update Admission H&P: {CHP DME Changes in RUKTD:464422062}    PHYSICIAN SIGNATURE:  Electronically signed by Pauly Perez DO on 22 at 7:09 AM EST

## 2022-01-25 NOTE — PROGRESS NOTES
Date:2022  Patient Name: Manish Tim  MRN: [id number]  : 1929  ROOM #: [id number]    Occupational Therapy order received, chart reviewed and evaluation attempted this date. Patient is unavailable for OT evaluation due to working with physical therapy. Will attempt OT evaluation at a later time. Thank you.    Mya Rivero OTR/L #[id number]

## 2022-01-25 NOTE — PROGRESS NOTES
[address] [facility]  [facility]. [place]        Date:2022                                                  Patient Name: Vanda Ruggiero    MRN: [id number]    : 1929    Room: 0339/0339-01      Evaluating OT: Mark Alatorre OTR/L; [id number]     Referring Provider and Specific Provider Orders/Date:      22 1030  OT eval and treat  Start:  22 1030,   End:  22 1030,   ONE TIME,   Standing Count:  1 Occurrences,   R         Jason Ji, APRN - CNP     Placement Recommendation: Subacute        Diagnosis:   1. LATOYA (acute kidney injury) ([facility] [address]    2.  Simple chronic bronchitis (HCC)         Surgery: none       Pertinent Medical History:       Past Medical History:   Diagnosis Date    Abdominal tenderness of left lower quadrant     Angina pectoris (HCC)     Arthritis     Bacteremia     Bacterial cellulitis     Bite of animal     Cardiac dysrhythmia     Cellulitis and abscess of upper extremity     Cellulitis of forearm     Cellulitis of lower limb     Chest wall pain     Colitis     Cough     Degenerative joint disease involving multiple joints     Dizziness and giddiness     Edema of lower extremity     Flare of rheumatoid arthritis (HCC)     Frozen shoulder     GERD (gastroesophageal reflux disease)     Hip pain, left     History of blood transfusion     Hyperlipidemia     Hypertension     Hypotensive syncope     Hypothyroidism     Laceration with foreign body of other part of head, initial encounter     Left flank pain     Mild dehydration     Mucous membrane inflammation     Multiple fractures of ribs of right side     Musculoskeletal pain     Open bite wound of skin     Oral candidiasis     Orthostatic hypotension     Other specified joint disorders, right ankle and foot     Ovarian mass     Peptic ulcer disease     Peripheral vertigo     Rheumatoid arthritis(714.0)     Right inguinal hernia     Senile osteoporosis     Spinal stenosis, lumbar region without neurogenic claudication     Spontaneous ecchymosis     Thyroid disease     Varicose veins of lower extremity          Past Surgical History:   Procedure Laterality Date    CARPAL TUNNEL RELEASE      right    CHOLECYSTECTOMY      COLONOSCOPY  2007    Dr. Eliazar Valentin. Elbow,Lt. Knee    KNEE SURGERY Left 1990    UPPER GASTROINTESTINAL ENDOSCOPY N/A 7/22/2020    EGD BIOPSY performed by Sonja Jeffries MD at [facility] ENDOSCOPY      Precautions:  Fall Risk, Dementia, alarm, arthritic hands, forearms dressed       Assessment of current deficits    [x] Functional mobility  [x]ADLs  [x] Strength               [x]Cognition    [x] Functional transfers   [x] IADLs         [x] Safety Awareness   [x]Endurance    [] Fine Coordination              [x] Balance      [] Vision/perception   []Sensation     []Gross Motor Coordination  [x] ROM  [] Delirium                   [] Motor Control     OT PLAN OF CARE   OT POC based on physician orders, patient diagnosis and results of clinical assessment    Frequency/Duration 1-3 days/wk for 2 weeks PRN      Specific OT Treatment Interventions to include:   * Instruction/training on adapted ADL techniques and AE recommendations to increase functional independence within precautions       * Training on energy conservation strategies, correct breathing pattern and techniques to improve independence/tolerance for self-care routine  * Functional transfer/mobility training/DME recommendations for increased independence, safety, and fall prevention  * Patient/Family education to increase follow through with safety techniques and functional independence  * Recommendation of environmental modifications for increased safety with functional transfers/mobility and ADLs  * Splinting/positioning for increased function, prevention of contractures, and improve skin integrity  * Therapeutic exercise to improve motor endurance, ROM, and functional strength for ADLs/functional transfers  * Therapeutic activities to facilitate/challenge dynamic balance, stand tolerance for increased safety and independence with ADLs  * Therapeutic activities to facilitate gross/fine motor skills for increased independence with ADLs  * Positioning to improve skin integrity, interaction with environment and functional independence    Recommended Adaptive Equipment: TBD at rehab      Home Living: Pt came to us from SNF       Equipment owned: SNF equipment     Prior Level of Function: Needs assistance with ADLs and IADLs; ambulated with wheeled walker and assistance    Pain Level: no pain at time of evaluation; Nursing notified.       Cognition: A&O: 2/4; Follows 1 step directions   Memory: poor   Sequencing: fair minus   Problem solving: fair minus   Judgement/safety: fair minus     AMPAC   AM-PAC Daily Activity Inpatient   How much help for putting on and taking off regular lower body clothing?: Total  How much help for Bathing?: A Lot  How much help for Toileting?: Total  How much help for putting on and taking off regular upper body clothing?: A Lot  How much help for taking care of personal grooming?: A Lot  How much help for eating meals?: A Little  AM-[facility] Inpatient Daily Activity Raw Score: 11  AM-PAC Inpatient ADL T-Scale Score : 29.04  ADL Inpatient CMS 0-100% Score: 70.42  ADL Inpatient CMS G-Code Modifier : CL     Functional Assessment:    Initial Eval Status  Date: 1/25/22 Treatment Status  Date: STGs = LTGs  Time frame: 10-14 days   Feeding Supervision with set up   Independent    Grooming Moderate Assist   Supervision    UB Dressing Maximal Assist   Minimal Assist    LB Dressing Dependent   Minimal Assist    Bathing Maximal Assist  Minimal Assist    Toileting Dependent   Moderate Assist    Bed Mobility  Supine to sit: Maximal Assist Sit to supine: Maximal Assist   Supine to sit: Minimal Assist   Sit to supine: Minimal Assist    Functional Transfers Moderate Assist from EOB to standard walker. Transfer training with verbal cues for hand placement throughout session to improve safety. Supervision    Functional Mobility Moderate Assist with standard walker to improve balance, 2 shuffling side steps towards head of bed, verbal cues for walker sequence and safety. Minimal Assist    Balance Sitting:     Static: fair, posterior lean when fatigued, sitting at EOB for 10 minues    Dynamic: fair minus  Standing: fair minus with standard walker     Activity Tolerance Fair minus  good    Visual/  Perceptual Glasses: no                 Hand Dominance: right      AROM (PROM) Strength Additional Info:    RUE  Limited in all planes  2/5 poor  and wfl FMC/dexterity noted during ADL tasks     LUE Limited in all planes  2/5 poor  and wfl FMC/dexterity noted during ADL tasks       [facility]: [employer] [facility] [place]   Sensation:  No c/o numbness or tingling  Tone: WFL   Edema: yes, feet     Comments: Upon arrival the patient was supine. At end of session, patient was returned to bed with call light and phone within reach, all lines and tubes intact. Overall patient demonstrated decreased independence and safety during completion of ADL/functional transfer/mobility tasks. Pt would benefit from continued skilled OT to increase safety and independence with completion of ADL/IADL tasks for functional independence and quality of life. Treatment: OT treatment provided this date includes:    Instruction/training on safety and adapted techniques for completion of ADLs    Instruction/training on safe functional mobility/transfer techniques    Instruction/training on energy conservation/work simplification for completion of ADLs    Proper Positioning/Alignment    Rehab Potential: Good for established goals.       Patient / Family Goal: return home       Patient and/or family were instructed on functional diagnosis, prognosis/goals and OT plan of care. Demonstrated good understanding. Eval Complexity: Low    Time In: 3:23pm  Time Out: 3:53pm   Total Treatment Time: 10       Min Units   OT Eval Low 97165  X  1    OT Eval Medium 49139      OT Eval High 97169      OT Re-Eval [id number]            ADL/Self Care 40070     Therapeutic Activities 62832  10  1    Therapeutic Ex 13162       Orthotic Management 15934       Manual 06149     Neuro Re-Ed 91655       Non-Billable Time        Evaluation Time additionally includes thorough review of current medical information, gathering information on past medical history/social history and prior level of function, interpretation of standardized testing/informal observation of tasks, assessment of data and development of plan of care and goals.         Evaluating OT: Juan Luis Mckeon OTR/L; [id number]

## (undated) DEVICE — BLOCK BITE 60FR CAREGUARD

## (undated) DEVICE — CONTAINER SPEC 480ML CLR POLYSTYR 10% NEUT BUFF FRMLN ZN

## (undated) DEVICE — FORCEPS BX L240CM JAW DIA2.8MM L CAP W/ NDL MIC MESH TOOTH

## (undated) DEVICE — TUBING, SUCTION, 1/4" X 10', STRAIGHT: Brand: MEDLINE

## (undated) DEVICE — TOWEL,OR,DSP,ST,BLUE,STD,6/PK,12PK/CS: Brand: MEDLINE

## (undated) DEVICE — KENDALL 450 SERIES MONITORING FOAM ELECTRODE - RECTANGULAR SHAPE ( 3/PK): Brand: KENDALL

## (undated) DEVICE — LUBRICANT SURG JELLY ST BACTER TUBE 4.25OZ

## (undated) DEVICE — GOWN ISOLATN REG YEL M WT MULTIPLY SIDETIE LEV 2

## (undated) DEVICE — 6 X 9  1.75MIL 4-WALL LABGUARD: Brand: MINIGRIP COMMERCIAL LLC

## (undated) DEVICE — COVER,LIGHT HANDLE,FLX,1/PK: Brand: MEDLINE INDUSTRIES, INC.

## (undated) DEVICE — SPONGE GZ 4IN 4IN 4 PLY N WVN AVANT

## (undated) DEVICE — YANKAUER,BULB TIP,W/O VENT,RIGID,STERILE: Brand: MEDLINE

## (undated) DEVICE — Device: Brand: DEFENDO VALVE AND CONNECTOR KIT

## (undated) DEVICE — KIT BEDSIDE REVITAL OX 500ML

## (undated) DEVICE — MASK,FACE,MAXFLUIDPROTECT,SHIELD/ERLPS: Brand: MEDLINE

## (undated) DEVICE — CONTAINER SPEC COLL 960ML POLYPR TRIANG GRAD INTAKE/OUTPUT